# Patient Record
Sex: MALE | Race: WHITE | Employment: UNEMPLOYED | URBAN - METROPOLITAN AREA
[De-identification: names, ages, dates, MRNs, and addresses within clinical notes are randomized per-mention and may not be internally consistent; named-entity substitution may affect disease eponyms.]

---

## 2021-05-11 ENCOUNTER — APPOINTMENT (OUTPATIENT)
Dept: GENERAL RADIOLOGY | Age: 41
DRG: 890 | End: 2021-05-11
Payer: MEDICAID

## 2021-05-11 ENCOUNTER — HOSPITAL ENCOUNTER (INPATIENT)
Age: 41
LOS: 6 days | Discharge: HOME | DRG: 890 | End: 2021-05-18
Attending: EMERGENCY MEDICINE | Admitting: INTERNAL MEDICINE
Payer: MEDICAID

## 2021-05-11 DIAGNOSIS — B20 AIDS (ACQUIRED IMMUNE DEFICIENCY SYNDROME) (HCC): ICD-10-CM

## 2021-05-11 DIAGNOSIS — J18.9 PNEUMONIA DUE TO ORGANISM: Primary | ICD-10-CM

## 2021-05-11 DIAGNOSIS — E87.1 HYPONATREMIA: ICD-10-CM

## 2021-05-11 PROCEDURE — 80053 COMPREHEN METABOLIC PANEL: CPT

## 2021-05-11 PROCEDURE — 99283 EMERGENCY DEPT VISIT LOW MDM: CPT

## 2021-05-11 PROCEDURE — 87040 BLOOD CULTURE FOR BACTERIA: CPT

## 2021-05-11 PROCEDURE — 83690 ASSAY OF LIPASE: CPT

## 2021-05-11 PROCEDURE — 87086 URINE CULTURE/COLONY COUNT: CPT

## 2021-05-11 PROCEDURE — 81001 URINALYSIS AUTO W/SCOPE: CPT

## 2021-05-11 PROCEDURE — 85025 COMPLETE CBC W/AUTO DIFF WBC: CPT

## 2021-05-11 PROCEDURE — 96374 THER/PROPH/DIAG INJ IV PUSH: CPT

## 2021-05-11 PROCEDURE — 85610 PROTHROMBIN TIME: CPT

## 2021-05-11 PROCEDURE — 85730 THROMBOPLASTIN TIME PARTIAL: CPT

## 2021-05-11 PROCEDURE — 71045 X-RAY EXAM CHEST 1 VIEW: CPT

## 2021-05-11 PROCEDURE — 83605 ASSAY OF LACTIC ACID: CPT

## 2021-05-11 PROCEDURE — 36415 COLL VENOUS BLD VENIPUNCTURE: CPT

## 2021-05-11 RX ORDER — SODIUM CHLORIDE 0.9 % (FLUSH) 0.9 %
5-40 SYRINGE (ML) INJECTION EVERY 12 HOURS SCHEDULED
Status: DISCONTINUED | OUTPATIENT
Start: 2021-05-11 | End: 2021-05-12

## 2021-05-11 RX ORDER — SODIUM CHLORIDE 9 MG/ML
25 INJECTION, SOLUTION INTRAVENOUS PRN
Status: DISCONTINUED | OUTPATIENT
Start: 2021-05-11 | End: 2021-05-18 | Stop reason: HOSPADM

## 2021-05-11 RX ORDER — SODIUM CHLORIDE 0.9 % (FLUSH) 0.9 %
5-40 SYRINGE (ML) INJECTION PRN
Status: DISCONTINUED | OUTPATIENT
Start: 2021-05-11 | End: 2021-05-12

## 2021-05-11 RX ORDER — 0.9 % SODIUM CHLORIDE 0.9 %
30 INTRAVENOUS SOLUTION INTRAVENOUS ONCE
Status: COMPLETED | OUTPATIENT
Start: 2021-05-11 | End: 2021-05-12

## 2021-05-11 ASSESSMENT — ENCOUNTER SYMPTOMS
NAUSEA: 1
WHEEZING: 0
CONSTIPATION: 0
ABDOMINAL PAIN: 0
COUGH: 1
CHEST TIGHTNESS: 0
FACIAL SWELLING: 0
DIARRHEA: 1
VOMITING: 0
EYE DISCHARGE: 0
SHORTNESS OF BREATH: 1
EYE PAIN: 0
EYE REDNESS: 0
RHINORRHEA: 0
COLOR CHANGE: 0
TROUBLE SWALLOWING: 0
BLOOD IN STOOL: 0
SORE THROAT: 0
SINUS PRESSURE: 0
BACK PAIN: 0

## 2021-05-11 ASSESSMENT — PAIN DESCRIPTION - DESCRIPTORS: DESCRIPTORS: DISCOMFORT

## 2021-05-12 ENCOUNTER — APPOINTMENT (OUTPATIENT)
Dept: CT IMAGING | Age: 41
DRG: 890 | End: 2021-05-12
Payer: MEDICAID

## 2021-05-12 ENCOUNTER — ANESTHESIA EVENT (OUTPATIENT)
Dept: ENDOSCOPY | Age: 41
DRG: 890 | End: 2021-05-12
Payer: MEDICAID

## 2021-05-12 PROBLEM — E87.1 HYPONATREMIA: Status: ACTIVE | Noted: 2021-05-12

## 2021-05-12 PROBLEM — R63.4 WEIGHT LOSS: Status: ACTIVE | Noted: 2021-05-12

## 2021-05-12 PROBLEM — E86.0 DEHYDRATION: Status: ACTIVE | Noted: 2021-05-12

## 2021-05-12 PROBLEM — Z72.0 TOBACCO USE: Status: ACTIVE | Noted: 2021-05-12

## 2021-05-12 PROBLEM — J18.9 PNEUMONIA: Status: ACTIVE | Noted: 2021-05-12

## 2021-05-12 LAB
-: ABNORMAL
ABSOLUTE EOS #: 0 K/UL (ref 0–0.4)
ABSOLUTE IMMATURE GRANULOCYTE: ABNORMAL K/UL (ref 0–0.3)
ABSOLUTE LYMPH #: 0.16 K/UL (ref 1–4.8)
ABSOLUTE MONO #: 0.16 K/UL (ref 0.1–1.3)
AFP: 1.7 UG/L
ALBUMIN SERPL-MCNC: 2.9 G/DL (ref 3.5–5.2)
ALBUMIN/GLOBULIN RATIO: ABNORMAL (ref 1–2.5)
ALP BLD-CCNC: 121 U/L (ref 40–129)
ALPHA-1 ANTITRYPSIN: 167 MG/DL (ref 90–200)
ALT SERPL-CCNC: 64 U/L (ref 5–41)
AMORPHOUS: ABNORMAL
ANION GAP SERPL CALCULATED.3IONS-SCNC: 10 MMOL/L (ref 9–17)
ANION GAP SERPL CALCULATED.3IONS-SCNC: 9 MMOL/L (ref 9–17)
AST SERPL-CCNC: 94 U/L
BACTERIA: ABNORMAL
BASOPHILS # BLD: 0 % (ref 0–2)
BASOPHILS ABSOLUTE: 0 K/UL (ref 0–0.2)
BILIRUB SERPL-MCNC: 1.05 MG/DL (ref 0.3–1.2)
BILIRUBIN URINE: ABNORMAL
BUN BLDV-MCNC: 13 MG/DL (ref 6–20)
BUN BLDV-MCNC: 14 MG/DL (ref 6–20)
BUN/CREAT BLD: ABNORMAL (ref 9–20)
BUN/CREAT BLD: ABNORMAL (ref 9–20)
C DIFF AG + TOXIN: NEGATIVE
CALCIUM SERPL-MCNC: 7.6 MG/DL (ref 8.6–10.4)
CALCIUM SERPL-MCNC: 8.3 MG/DL (ref 8.6–10.4)
CASTS UA: ABNORMAL /LPF
CASTS UA: ABNORMAL /LPF
CERULOPLASMIN: 20 MG/DL (ref 15–30)
CHLORIDE BLD-SCNC: 100 MMOL/L (ref 98–107)
CHLORIDE BLD-SCNC: 91 MMOL/L (ref 98–107)
CO2: 21 MMOL/L (ref 20–31)
CO2: 25 MMOL/L (ref 20–31)
COLOR: ABNORMAL
COMMENT UA: ABNORMAL
CORTISOL COLLECTION INFO: ABNORMAL
CORTISOL: 24.7 UG/DL (ref 2.7–18.4)
CREAT SERPL-MCNC: 0.54 MG/DL (ref 0.7–1.2)
CREAT SERPL-MCNC: 0.78 MG/DL (ref 0.7–1.2)
CRYSTALS, UA: ABNORMAL /HPF
DIFFERENTIAL TYPE: ABNORMAL
EOSINOPHILS RELATIVE PERCENT: 0 % (ref 0–4)
EPITHELIAL CELLS UA: ABNORMAL /HPF
FERRITIN: 2657 UG/L (ref 30–400)
FOLATE: 11.4 NG/ML
GFR AFRICAN AMERICAN: >60 ML/MIN
GFR AFRICAN AMERICAN: >60 ML/MIN
GFR NON-AFRICAN AMERICAN: >60 ML/MIN
GFR NON-AFRICAN AMERICAN: >60 ML/MIN
GFR SERPL CREATININE-BSD FRML MDRD: ABNORMAL ML/MIN/{1.73_M2}
GLUCOSE BLD-MCNC: 84 MG/DL (ref 70–99)
GLUCOSE BLD-MCNC: 90 MG/DL (ref 70–99)
GLUCOSE URINE: NEGATIVE
HAV IGM SER IA-ACNC: NONREACTIVE
HCT VFR BLD CALC: 29.8 % (ref 41–53)
HEMOGLOBIN: 10.1 G/DL (ref 13.5–17.5)
HEPATITIS B CORE IGM ANTIBODY: NONREACTIVE
HEPATITIS B SURFACE ANTIGEN: NONREACTIVE
HEPATITIS C ANTIBODY: NONREACTIVE
HIV AG/AB: REACTIVE
IGA: 511 MG/DL (ref 70–400)
IMMATURE GRANULOCYTES: ABNORMAL %
INR BLD: 1.1
IRON SATURATION: 16 % (ref 20–55)
IRON: 25 UG/DL (ref 59–158)
KETONES, URINE: NEGATIVE
LACTIC ACID, SEPSIS WHOLE BLOOD: NORMAL MMOL/L (ref 0.5–1.9)
LACTIC ACID, SEPSIS: 1 MMOL/L (ref 0.5–1.9)
LEGIONELLA PNEUMOPHILIA AG, URINE: NEGATIVE
LEUKOCYTE ESTERASE, URINE: NEGATIVE
LIPASE: 84 U/L (ref 13–60)
LYMPHOCYTES # BLD: 3 % (ref 24–44)
MCH RBC QN AUTO: 30.5 PG (ref 26–34)
MCHC RBC AUTO-ENTMCNC: 33.9 G/DL (ref 31–37)
MCV RBC AUTO: 90.2 FL (ref 80–100)
MONOCYTES # BLD: 3 % (ref 1–7)
MORPHOLOGY: ABNORMAL
MORPHOLOGY: ABNORMAL
MUCUS: ABNORMAL
NITRITE, URINE: NEGATIVE
NRBC AUTOMATED: ABNORMAL PER 100 WBC
OTHER OBSERVATIONS UA: ABNORMAL
PARTIAL THROMBOPLASTIN TIME: 41.4 SEC (ref 24–36)
PDW BLD-RTO: 15.2 % (ref 11.5–14.9)
PH UA: 6 (ref 5–8)
PLATELET # BLD: 263 K/UL (ref 150–450)
PLATELET ESTIMATE: ABNORMAL
PMV BLD AUTO: 8.5 FL (ref 6–12)
POTASSIUM SERPL-SCNC: 3.7 MMOL/L (ref 3.7–5.3)
POTASSIUM SERPL-SCNC: 4.5 MMOL/L (ref 3.7–5.3)
PROTEIN UA: ABNORMAL
PROTHROMBIN TIME: 14.6 SEC (ref 11.8–14.6)
RBC # BLD: 3.3 M/UL (ref 4.5–5.9)
RBC # BLD: ABNORMAL 10*6/UL
RBC UA: ABNORMAL /HPF
RENAL EPITHELIAL, UA: ABNORMAL /HPF
SARS-COV-2, RAPID: NOT DETECTED
SEG NEUTROPHILS: 94 % (ref 36–66)
SEGMENTED NEUTROPHILS ABSOLUTE COUNT: 4.88 K/UL (ref 1.3–9.1)
SODIUM BLD-SCNC: 126 MMOL/L (ref 135–144)
SODIUM BLD-SCNC: 130 MMOL/L (ref 135–144)
SPECIFIC GRAVITY UA: 1.03 (ref 1–1.03)
SPECIMEN DESCRIPTION: NORMAL
SPECIMEN DESCRIPTION: NORMAL
T. PALLIDUM, IGG: REACTIVE
TOTAL IRON BINDING CAPACITY: 156 UG/DL (ref 250–450)
TOTAL PROTEIN: 7.2 G/DL (ref 6.4–8.3)
TRICHOMONAS: ABNORMAL
TSH SERPL DL<=0.05 MIU/L-ACNC: 1.42 MIU/L (ref 0.3–5)
TURBIDITY: ABNORMAL
UNSATURATED IRON BINDING CAPACITY: 131 UG/DL (ref 112–347)
URINE HGB: ABNORMAL
UROBILINOGEN, URINE: NORMAL
VITAMIN B-12: 547 PG/ML (ref 232–1245)
WBC # BLD: 5.2 K/UL (ref 3.5–11)
WBC # BLD: ABNORMAL 10*3/UL
WBC UA: ABNORMAL /HPF
YEAST: ABNORMAL

## 2021-05-12 PROCEDURE — 83540 ASSAY OF IRON: CPT

## 2021-05-12 PROCEDURE — 86359 T CELLS TOTAL COUNT: CPT

## 2021-05-12 PROCEDURE — 87449 NOS EACH ORGANISM AG IA: CPT

## 2021-05-12 PROCEDURE — 6370000000 HC RX 637 (ALT 250 FOR IP): Performed by: NURSE PRACTITIONER

## 2021-05-12 PROCEDURE — 6360000002 HC RX W HCPCS: Performed by: INTERNAL MEDICINE

## 2021-05-12 PROCEDURE — 99254 IP/OBS CNSLTJ NEW/EST MOD 60: CPT | Performed by: INTERNAL MEDICINE

## 2021-05-12 PROCEDURE — 87635 SARS-COV-2 COVID-19 AMP PRB: CPT

## 2021-05-12 PROCEDURE — 82607 VITAMIN B-12: CPT

## 2021-05-12 PROCEDURE — 87591 N.GONORRHOEAE DNA AMP PROB: CPT

## 2021-05-12 PROCEDURE — 2580000003 HC RX 258: Performed by: INTERNAL MEDICINE

## 2021-05-12 PROCEDURE — 82533 TOTAL CORTISOL: CPT

## 2021-05-12 PROCEDURE — 6360000004 HC RX CONTRAST MEDICATION: Performed by: INTERNAL MEDICINE

## 2021-05-12 PROCEDURE — 87491 CHLMYD TRACH DNA AMP PROBE: CPT

## 2021-05-12 PROCEDURE — 82105 ALPHA-FETOPROTEIN SERUM: CPT

## 2021-05-12 PROCEDURE — 2580000003 HC RX 258: Performed by: NURSE PRACTITIONER

## 2021-05-12 PROCEDURE — 6360000002 HC RX W HCPCS: Performed by: NURSE PRACTITIONER

## 2021-05-12 PROCEDURE — 86593 SYPHILIS TEST NON-TREP QUANT: CPT

## 2021-05-12 PROCEDURE — 86665 EPSTEIN-BARR CAPSID VCA: CPT

## 2021-05-12 PROCEDURE — 86038 ANTINUCLEAR ANTIBODIES: CPT

## 2021-05-12 PROCEDURE — 86664 EPSTEIN-BARR NUCLEAR ANTIGEN: CPT

## 2021-05-12 PROCEDURE — 86663 EPSTEIN-BARR ANTIBODY: CPT

## 2021-05-12 PROCEDURE — 86644 CMV ANTIBODY: CPT

## 2021-05-12 PROCEDURE — 86357 NK CELLS TOTAL COUNT: CPT

## 2021-05-12 PROCEDURE — 86702 HIV-2 ANTIBODY: CPT

## 2021-05-12 PROCEDURE — 82784 ASSAY IGA/IGD/IGG/IGM EACH: CPT

## 2021-05-12 PROCEDURE — 82390 ASSAY OF CERULOPLASMIN: CPT

## 2021-05-12 PROCEDURE — 86376 MICROSOMAL ANTIBODY EACH: CPT

## 2021-05-12 PROCEDURE — 6370000000 HC RX 637 (ALT 250 FOR IP): Performed by: INTERNAL MEDICINE

## 2021-05-12 PROCEDURE — 82746 ASSAY OF FOLIC ACID SERUM: CPT

## 2021-05-12 PROCEDURE — 86738 MYCOPLASMA ANTIBODY: CPT

## 2021-05-12 PROCEDURE — 82103 ALPHA-1-ANTITRYPSIN TOTAL: CPT

## 2021-05-12 PROCEDURE — APPNB30 APP NON BILLABLE TIME 0-30 MINS: Performed by: NURSE PRACTITIONER

## 2021-05-12 PROCEDURE — 6360000002 HC RX W HCPCS: Performed by: EMERGENCY MEDICINE

## 2021-05-12 PROCEDURE — 87389 HIV-1 AG W/HIV-1&-2 AB AG IA: CPT

## 2021-05-12 PROCEDURE — 6370000000 HC RX 637 (ALT 250 FOR IP): Performed by: EMERGENCY MEDICINE

## 2021-05-12 PROCEDURE — 80048 BASIC METABOLIC PNL TOTAL CA: CPT

## 2021-05-12 PROCEDURE — 87641 MR-STAPH DNA AMP PROBE: CPT

## 2021-05-12 PROCEDURE — 83550 IRON BINDING TEST: CPT

## 2021-05-12 PROCEDURE — 84443 ASSAY THYROID STIM HORMONE: CPT

## 2021-05-12 PROCEDURE — 86355 B CELLS TOTAL COUNT: CPT

## 2021-05-12 PROCEDURE — 71260 CT THORAX DX C+: CPT

## 2021-05-12 PROCEDURE — 87536 HIV-1 QUANT&REVRSE TRNSCRPJ: CPT

## 2021-05-12 PROCEDURE — 86701 HIV-1ANTIBODY: CPT

## 2021-05-12 PROCEDURE — 2580000003 HC RX 258: Performed by: EMERGENCY MEDICINE

## 2021-05-12 PROCEDURE — 86360 T CELL ABSOLUTE COUNT/RATIO: CPT

## 2021-05-12 PROCEDURE — 99223 1ST HOSP IP/OBS HIGH 75: CPT | Performed by: INTERNAL MEDICINE

## 2021-05-12 PROCEDURE — 82728 ASSAY OF FERRITIN: CPT

## 2021-05-12 PROCEDURE — 87324 CLOSTRIDIUM AG IA: CPT

## 2021-05-12 PROCEDURE — 86780 TREPONEMA PALLIDUM: CPT

## 2021-05-12 PROCEDURE — 2060000000 HC ICU INTERMEDIATE R&B

## 2021-05-12 PROCEDURE — 86480 TB TEST CELL IMMUN MEASURE: CPT

## 2021-05-12 PROCEDURE — 86645 CMV ANTIBODY IGM: CPT

## 2021-05-12 PROCEDURE — 83516 IMMUNOASSAY NONANTIBODY: CPT

## 2021-05-12 PROCEDURE — 80074 ACUTE HEPATITIS PANEL: CPT

## 2021-05-12 PROCEDURE — 36415 COLL VENOUS BLD VENIPUNCTURE: CPT

## 2021-05-12 RX ORDER — M-VIT,TX,IRON,MINS/CALC/FOLIC 27MG-0.4MG
1 TABLET ORAL DAILY
Status: DISCONTINUED | OUTPATIENT
Start: 2021-05-12 | End: 2021-05-18 | Stop reason: HOSPADM

## 2021-05-12 RX ORDER — NICOTINE 21 MG/24HR
1 PATCH, TRANSDERMAL 24 HOURS TRANSDERMAL DAILY PRN
Status: DISCONTINUED | OUTPATIENT
Start: 2021-05-12 | End: 2021-05-12

## 2021-05-12 RX ORDER — ACETAMINOPHEN 650 MG/1
650 SUPPOSITORY RECTAL EVERY 6 HOURS PRN
Status: DISCONTINUED | OUTPATIENT
Start: 2021-05-12 | End: 2021-05-18 | Stop reason: HOSPADM

## 2021-05-12 RX ORDER — ACETAMINOPHEN 325 MG/1
650 TABLET ORAL ONCE
Status: COMPLETED | OUTPATIENT
Start: 2021-05-12 | End: 2021-05-12

## 2021-05-12 RX ORDER — FLUCONAZOLE 100 MG/1
200 TABLET ORAL DAILY
Status: DISCONTINUED | OUTPATIENT
Start: 2021-05-12 | End: 2021-05-13

## 2021-05-12 RX ORDER — PROMETHAZINE HYDROCHLORIDE 25 MG/1
12.5 TABLET ORAL EVERY 6 HOURS PRN
Status: DISCONTINUED | OUTPATIENT
Start: 2021-05-12 | End: 2021-05-18 | Stop reason: HOSPADM

## 2021-05-12 RX ORDER — MAGNESIUM SULFATE 1 G/100ML
1000 INJECTION INTRAVENOUS PRN
Status: DISCONTINUED | OUTPATIENT
Start: 2021-05-12 | End: 2021-05-18 | Stop reason: HOSPADM

## 2021-05-12 RX ORDER — ONDANSETRON 2 MG/ML
4 INJECTION INTRAMUSCULAR; INTRAVENOUS EVERY 6 HOURS PRN
Status: DISCONTINUED | OUTPATIENT
Start: 2021-05-12 | End: 2021-05-18 | Stop reason: HOSPADM

## 2021-05-12 RX ORDER — POTASSIUM CHLORIDE 20 MEQ/1
40 TABLET, EXTENDED RELEASE ORAL PRN
Status: DISCONTINUED | OUTPATIENT
Start: 2021-05-12 | End: 2021-05-18 | Stop reason: HOSPADM

## 2021-05-12 RX ORDER — POLYETHYLENE GLYCOL 3350 17 G/17G
17 POWDER, FOR SOLUTION ORAL DAILY PRN
Status: DISCONTINUED | OUTPATIENT
Start: 2021-05-12 | End: 2021-05-18 | Stop reason: HOSPADM

## 2021-05-12 RX ORDER — SODIUM CHLORIDE 9 MG/ML
INJECTION, SOLUTION INTRAVENOUS CONTINUOUS
Status: DISCONTINUED | OUTPATIENT
Start: 2021-05-12 | End: 2021-05-18 | Stop reason: HOSPADM

## 2021-05-12 RX ORDER — 0.9 % SODIUM CHLORIDE 0.9 %
80 INTRAVENOUS SOLUTION INTRAVENOUS ONCE
Status: COMPLETED | OUTPATIENT
Start: 2021-05-12 | End: 2021-05-12

## 2021-05-12 RX ORDER — SODIUM CHLORIDE 0.9 % (FLUSH) 0.9 %
10 SYRINGE (ML) INJECTION PRN
Status: DISCONTINUED | OUTPATIENT
Start: 2021-05-12 | End: 2021-05-18 | Stop reason: HOSPADM

## 2021-05-12 RX ORDER — BENZONATATE 100 MG/1
100 CAPSULE ORAL 3 TIMES DAILY PRN
Status: DISCONTINUED | OUTPATIENT
Start: 2021-05-12 | End: 2021-05-17

## 2021-05-12 RX ORDER — SODIUM CHLORIDE 0.9 % (FLUSH) 0.9 %
5-40 SYRINGE (ML) INJECTION EVERY 12 HOURS SCHEDULED
Status: DISCONTINUED | OUTPATIENT
Start: 2021-05-12 | End: 2021-05-18 | Stop reason: HOSPADM

## 2021-05-12 RX ORDER — ONDANSETRON 2 MG/ML
4 INJECTION INTRAMUSCULAR; INTRAVENOUS ONCE
Status: DISCONTINUED | OUTPATIENT
Start: 2021-05-12 | End: 2021-05-18 | Stop reason: HOSPADM

## 2021-05-12 RX ORDER — SODIUM CHLORIDE 9 MG/ML
25 INJECTION, SOLUTION INTRAVENOUS PRN
Status: DISCONTINUED | OUTPATIENT
Start: 2021-05-12 | End: 2021-05-18 | Stop reason: HOSPADM

## 2021-05-12 RX ORDER — NICOTINE 21 MG/24HR
1 PATCH, TRANSDERMAL 24 HOURS TRANSDERMAL DAILY
Status: DISCONTINUED | OUTPATIENT
Start: 2021-05-12 | End: 2021-05-18 | Stop reason: HOSPADM

## 2021-05-12 RX ORDER — BENZONATATE 100 MG/1
100 CAPSULE ORAL 3 TIMES DAILY PRN
Status: DISCONTINUED | OUTPATIENT
Start: 2021-05-12 | End: 2021-05-12 | Stop reason: SDUPTHER

## 2021-05-12 RX ORDER — POTASSIUM CHLORIDE 7.45 MG/ML
10 INJECTION INTRAVENOUS PRN
Status: DISCONTINUED | OUTPATIENT
Start: 2021-05-12 | End: 2021-05-18 | Stop reason: HOSPADM

## 2021-05-12 RX ORDER — SODIUM CHLORIDE, SODIUM LACTATE, POTASSIUM CHLORIDE, CALCIUM CHLORIDE 600; 310; 30; 20 MG/100ML; MG/100ML; MG/100ML; MG/100ML
INJECTION, SOLUTION INTRAVENOUS CONTINUOUS
Status: CANCELLED | OUTPATIENT
Start: 2021-05-12

## 2021-05-12 RX ORDER — ACETAMINOPHEN 325 MG/1
650 TABLET ORAL EVERY 6 HOURS PRN
Status: DISCONTINUED | OUTPATIENT
Start: 2021-05-12 | End: 2021-05-18 | Stop reason: HOSPADM

## 2021-05-12 RX ADMIN — ACETAMINOPHEN 650 MG: 325 TABLET, FILM COATED ORAL at 13:19

## 2021-05-12 RX ADMIN — SODIUM CHLORIDE, PRESERVATIVE FREE 10 ML: 5 INJECTION INTRAVENOUS at 13:49

## 2021-05-12 RX ADMIN — CEFTRIAXONE SODIUM 1000 MG: 1 INJECTION, POWDER, FOR SOLUTION INTRAMUSCULAR; INTRAVENOUS at 14:51

## 2021-05-12 RX ADMIN — AZITHROMYCIN MONOHYDRATE 500 MG: 500 INJECTION, POWDER, LYOPHILIZED, FOR SOLUTION INTRAVENOUS at 00:48

## 2021-05-12 RX ADMIN — VANCOMYCIN HYDROCHLORIDE 1750 MG: 1 INJECTION, POWDER, LYOPHILIZED, FOR SOLUTION INTRAVENOUS at 17:02

## 2021-05-12 RX ADMIN — Medication 10 ML: at 11:13

## 2021-05-12 RX ADMIN — IOHEXOL 50 ML: 240 INJECTION, SOLUTION INTRATHECAL; INTRAVASCULAR; INTRAVENOUS; ORAL at 11:11

## 2021-05-12 RX ADMIN — FLUCONAZOLE 200 MG: 100 TABLET ORAL at 11:11

## 2021-05-12 RX ADMIN — SODIUM CHLORIDE 80 ML: 9 INJECTION, SOLUTION INTRAVENOUS at 13:49

## 2021-05-12 RX ADMIN — SODIUM CHLORIDE, PRESERVATIVE FREE 10 ML: 5 INJECTION INTRAVENOUS at 11:13

## 2021-05-12 RX ADMIN — SODIUM CHLORIDE: 9 INJECTION, SOLUTION INTRAVENOUS at 17:02

## 2021-05-12 RX ADMIN — SODIUM CHLORIDE, PRESERVATIVE FREE 10 ML: 5 INJECTION INTRAVENOUS at 22:00

## 2021-05-12 RX ADMIN — IOPAMIDOL 75 ML: 755 INJECTION, SOLUTION INTRAVENOUS at 13:48

## 2021-05-12 RX ADMIN — CEFTRIAXONE SODIUM 1000 MG: 1 INJECTION, POWDER, FOR SOLUTION INTRAMUSCULAR; INTRAVENOUS at 00:33

## 2021-05-12 RX ADMIN — SODIUM CHLORIDE 1701 ML: 9 INJECTION, SOLUTION INTRAVENOUS at 00:04

## 2021-05-12 RX ADMIN — MULTIPLE VITAMINS W/ MINERALS TAB 1 TABLET: TAB at 11:11

## 2021-05-12 RX ADMIN — CEFEPIME 2000 MG: 2 INJECTION, POWDER, FOR SOLUTION INTRAVENOUS at 17:02

## 2021-05-12 RX ADMIN — POLYETHYLENE GLYCOL-3350 AND ELECTROLYTES 4000 ML: 236; 6.74; 5.86; 2.97; 22.74 POWDER, FOR SOLUTION ORAL at 18:10

## 2021-05-12 RX ADMIN — ACETAMINOPHEN 650 MG: 325 TABLET ORAL at 00:54

## 2021-05-12 RX ADMIN — SODIUM CHLORIDE 75 ML/HR: 9 INJECTION, SOLUTION INTRAVENOUS at 03:10

## 2021-05-12 ASSESSMENT — ENCOUNTER SYMPTOMS
CHEST TIGHTNESS: 1
DIARRHEA: 1
TROUBLE SWALLOWING: 0
VOMITING: 0
PHOTOPHOBIA: 0
SHORTNESS OF BREATH: 1
RHINORRHEA: 0
NAUSEA: 1
SORE THROAT: 0
WHEEZING: 0
SINUS PAIN: 0
ALLERGIC/IMMUNOLOGIC NEGATIVE: 1
COUGH: 1
ABDOMINAL PAIN: 0
COLOR CHANGE: 0

## 2021-05-12 ASSESSMENT — PAIN SCALES - GENERAL
PAINLEVEL_OUTOF10: 5
PAINLEVEL_OUTOF10: 3

## 2021-05-12 ASSESSMENT — LIFESTYLE VARIABLES: SMOKING_STATUS: 1

## 2021-05-12 NOTE — ED NOTES
Mode of arrival (squad #, walk in, police, etc) : Walked into triage        Chief complaint(s): Pneumonia        Arrival Note (brief scenario, treatment PTA, etc). : States he has been sick since Jan. States he was diagnosed with Bronchitis. States he has had a 30 pound weight loss since Jan. Complaining of no appetite. States she has night sweats. States he developed diarrhea today. A/o x 3        C= \"Have you ever felt that you should Cut down on your drinking? \"  No  A= \"Have people Annoyed you by criticizing your drinking? \"  No  G= \"Have you ever felt bad or Guilty about your drinking? \"  No  E= \"Have you ever had a drink as an Eye-opener first thing in the morning to steady your nerves or to help a hangover? \"  No      Deferred []      Reason for deferring: N/A    *If yes to two or more: probable alcohol abuse. Andi Cabrera RN  05/12/21 1024

## 2021-05-12 NOTE — PROGRESS NOTES
Dr Sveta Almanzar notified of Dr Kalen Membreno request for a pulmonology consult. Will continue to follow.  Electronically signed by Arabella Sanderson RN on 5/12/2021 at 1:28 PM

## 2021-05-12 NOTE — ANESTHESIA PRE PROCEDURE
Smoking status: Current Every Day Smoker     Packs/day: 0.50     Types: Cigarettes    Smokeless tobacco: Never Used    Tobacco comment: HAsn't craved for past 5 months   Substance Use Topics    Alcohol use: Not Currently                                Ready to quit: Yes  Counseling given: Not Answered  Comment: HAsn't craved for past 5 months      Vital Signs (Current):   Vitals:    05/12/21 0254 05/12/21 0420 05/12/21 0745 05/12/21 1530   BP: 99/69 99/67 93/69 98/67   Pulse: 86 69 68 84   Resp: 18 18 20 19   Temp: 97.8 °F (36.6 °C) 97.2 °F (36.2 °C) 97.5 °F (36.4 °C) 97.9 °F (36.6 °C)   TempSrc: Oral Oral Oral Oral   SpO2: 95% 100%  98%   Weight:  147 lb 7.8 oz (66.9 kg)     Height:  5' 11\" (1.803 m)                                                BP Readings from Last 3 Encounters:   05/12/21 98/67       NPO Status:                                                                                 BMI:   Wt Readings from Last 3 Encounters:   05/12/21 147 lb 7.8 oz (66.9 kg)     Body mass index is 20.57 kg/m². CBC:   Lab Results   Component Value Date    WBC 5.2 05/11/2021    RBC 3.30 05/11/2021    HGB 10.1 05/11/2021    HCT 29.8 05/11/2021    MCV 90.2 05/11/2021    RDW 15.2 05/11/2021     05/11/2021       CMP:   Lab Results   Component Value Date     05/12/2021    K 3.7 05/12/2021     05/12/2021    CO2 21 05/12/2021    BUN 13 05/12/2021    CREATININE 0.54 05/12/2021    GFRAA >60 05/12/2021    LABGLOM >60 05/12/2021    GLUCOSE 90 05/12/2021    PROT 7.2 05/11/2021    CALCIUM 7.6 05/12/2021    BILITOT 1.05 05/11/2021    ALKPHOS 121 05/11/2021    AST 94 05/11/2021    ALT 64 05/11/2021       POC Tests: No results for input(s): POCGLU, POCNA, POCK, POCCL, POCBUN, POCHEMO, POCHCT in the last 72 hours.     Coags:   Lab Results   Component Value Date    PROTIME 14.6 05/11/2021    INR 1.1 05/11/2021    APTT 41.4 05/11/2021       HCG (If Applicable): No results found for: PREGTESTUR, PREGSERUM, HCG, HCGQUANT     ABGs: No results found for: PHART, PO2ART, TIA4PWK, YQQ6VZS, BEART, O0UKVSFM     Type & Screen (If Applicable):  No results found for: LABABO, LABRH    Drug/Infectious Status (If Applicable):  No results found for: HIV, HEPCAB    COVID-19 Screening (If Applicable):   Lab Results   Component Value Date    COVID19 Not Detected 05/12/2021           Anesthesia Evaluation  Patient summary reviewed and Nursing notes reviewed no history of anesthetic complications:   Airway: Mallampati: III  TM distance: >3 FB   Neck ROM: full  Mouth opening: > = 3 FB Dental:      Comment: Intact front, missing posterior    Pulmonary:normal exam  breath sounds clear to auscultation  (+) pneumonia: unresolved,  current smoker                           Cardiovascular:Negative CV ROS            Rhythm: regular  Rate: normal                    Neuro/Psych:   Negative Neuro/Psych ROS              GI/Hepatic/Renal:   (+) bowel prep,          ROS comment: Weight loss. Endo/Other:    (+) blood dyscrasia: anemia:., .                 Abdominal:           Vascular: negative vascular ROS. Anesthesia Plan      MAC     ASA 3       Induction: intravenous. MIPS: Prophylactic antiemetics administered. Anesthetic plan and risks discussed with patient. Plan discussed with CRNA.                   Lay Cabrera MD   5/12/2021

## 2021-05-12 NOTE — CONSULTS
[facility] -   Infectious diseases evaluation  admission date 5/11/2021    reason for consultation:   Pneumonia    Impression :   Current:  · Cavitary and noncavitary lung nodules/multiple splenic hypodensities, cough, shortness of breath, fatigue, fever, diarrhea, decreased appetite and thrush  homosexual male differential diagnosis includes infection or malignancy  · Thrush  · Hyponatremia  · Smoking  · History of kidney stones        Recommendations   · Pulmonary consult /bronchoscopy  · Continue IV Zithromax  · Discontinue ceftriaxone  · IV vancomycin and cefepime  · Nasal swab for MRSA  · Continue Diflucan  · HIV viral load  · Lymphocyte subtype  · Echocardiogram  · Nasal swab for MRSA  · Procalcitonin level  · QuantiFERON-TB test  · HIV screen pending  · Stool for C. difficile pending  · Hepatitis panel pending  · HIV screen pending  · Syphilis serology pending  · Urine for Legionella antigen negative  · Urine for GC and Chlamydia DNA pending  · COVID-19 rapid test negative  · Follow blood cultures  · GI consulted for EGD        History of Present Illness:   Initial history:  Darling Santana is a 45y.o.-year-old male presented to hospital with worsening shortness of breath associated with cough productive white phlegm, generalized weakness and subjective fever for 4 months, gradual onset, continues, moderate in severity, no alleviating or aggravating factors. Patient also complaining of decreased appetite, dizziness, significant weight loss. He was constipated initially then developed diarrhea after stool softeners. He also complaining of thrush, tried several over-the-counter treatments with no improvement  He is homosexual, had multiple partners in the past, none of his previous partners has been sick to his knowledge, denied recent travel or sick contact, has not been sexually active since January.   Patient was tested negative for HIV reportedly as Mood and Affect: Mood normal.         Behavior: Behavior normal.         Past Medical History:     Past Medical History:   Diagnosis Date    Hydronephrosis 2015    Pt unsure of dx time but believes about 6 yrs ago    Kidney stone        Past Surgical  History:     Past Surgical History:   Procedure Laterality Date    APPENDECTOMY  2005    Pt believes when he was about 25yrs        Medications:      sodium chloride flush  5-40 mL Intravenous 2 times per day    [Held by provider] enoxaparin  40 mg Subcutaneous Daily    cefTRIAXone (ROCEPHIN) IV  1,000 mg Intravenous Q12H    azithromycin  500 mg Intravenous Q24H    nicotine  1 patch Transdermal Daily    fluconazole  200 mg Oral Daily    therapeutic multivitamin-minerals  1 tablet Oral Daily    polyethylene glycol  4,000 mL Oral Once    ondansetron  4 mg Intravenous Once       Social History:     Social History     Socioeconomic History    Marital status: Single     Spouse name: Not on file    Number of children: Not on file    Years of education: Not on file    Highest education level: Not on file   Occupational History    Not on file   Social Needs    Financial resource strain: Not on file    Food insecurity     Worry: Not on file     Inability: Not on file    Transportation needs     Medical: Not on file     Non-medical: Not on file   Tobacco Use    Smoking status: Current Every Day Smoker     Packs/day: 0.50     Types: Cigarettes    Smokeless tobacco: Never Used    Tobacco comment: HAsn't craved for past 5 months   Substance and Sexual Activity    Alcohol use: Not Currently    Drug use: Not Currently     Types: Marijuana     Comment: Feburary 2021 last time     Sexual activity: Not Currently     Partners: Male   Lifestyle    Physical activity     Days per week: Not on file     Minutes per session: Not on file    Stress: Not on file   Relationships    Social connections     Talks on phone: Not on file     Gets together: Not on file Attends [employer] witness service: Not on file     Active member of club or organization: Not on file     Attends meetings of clubs or organizations: Not on file     Relationship status: Not on file    Intimate partner violence     Fear of current or ex partner: Not on file     Emotionally abused: Not on file     Physically abused: Not on file     Forced sexual activity: Not on file   Other Topics Concern    Not on file   Social History Narrative    Not on file       Family History:   History reviewed. No pertinent family history. Medical Decision Making:   I have independently reviewed/ordered the following labs:    CBC with Differential:   Recent Labs     05/11/21  2344   WBC 5.2   HGB 10.1*   HCT 29.8*      LYMPHOPCT 3*   MONOPCT 3     BMP:  Recent Labs     05/11/21  2344 05/12/21  0546   * 130*   K 4.5 3.7   CL 91* 100   CO2 25 21   BUN 14 13   CREATININE 0.78 0.54*     Hepatic Function Panel:   Recent Labs     05/11/21  2344   PROT 7.2   LABALBU 2.9*   BILITOT 1.05   ALKPHOS 121   ALT 64*   AST 94*     No results for input(s): RPR in the last 72 hours. No results for input(s): HIV in the last 72 hours. No results for input(s): BC in the last 72 hours. Lab Results   Component Value Date    CREATININE 0.54 05/12/2021    GLUCOSE 90 05/12/2021       Detailed results: Thank you for allowing us to participate in the care of this patient. Please call with questions. This note is created with the assistance of a speech recognition program.  While intending to generate adocument that actually reflects the content of the visit, the document can still have some errors including those of syntax and sound a like substitutions which may escape proof reading. It such instances, actual meaningcan be extrapolated by contextual diversion.     Doc Overall, MD  Office: [phone]  Perfect serve / office [phone]

## 2021-05-12 NOTE — PLAN OF CARE
PRE CONSULT ROUNDING NOTE  HPI  45year old male with pmh of kidney stones who presented to the ED for  Fever dizziness decreased appetite constipation cough reported fever and weight loss. He is on antibiotics for possible pneumonia Our service is consulted for possible need for egd to r/o candida esophagitis. He states his symptoms started in January of this year. He was treated with a z pack one month ago with some improvement but not complete resolution of the symptoms. He has had daily night sweats, 30# weight loss also . States his covid testing was negative. He reports constipation \"for months\". He took a suppository one week ago which resulted in him having \"black stool pellets\" and then one episode of green non bloody diarrhea yesterday. He has been taking 4 tablets of motrin (200mg each) 3-4 times per week since last October but denies tylenol use. He denies previous liver disease and anemia. A CT AP has not been completed yet. Na 130 hgb 10.1 inr 1.1 alt 64 ast 94 lipase 84. cxr shows small airways disease and reticulonodular interstitial disease. Urinalysis is positive for bilirubin and moderate hgb and bacteria. The pt has not had dysphagia odynophagia hematemesis hematochezia abdominal pain rash new joint pain.     Endoscopy none  Family reports no hx of liver pancreatic stomach or colon cancer no UC/crohns  Social positive for  smoking no etoh positive for marijuana use   BP 93/69   Pulse 68   Temp 97.5 °F (36.4 °C) (Oral)   Resp 20   Ht 5' 11\" (1.803 m)   Wt 147 lb 7.8 oz (66.9 kg)   SpO2 100%   BMI 20.57 kg/m²     ROS as above meds labs imaging and past medical records were reviewed    Exam  General Appearance: alert and oriented to person, place and time, well-developed and mal-nourished, in no acute distress  Skin: warm and dry, no rash or erythema  Head: normocephalic and atraumatic  Eyes: pupils equal, round, and reactive to light, extraocular eye movements intact, conjunctivae normal  ENT: hearing grossly normal bilaterally  Neck: neck supple and non tender without mass, no thyromegaly or thyroid nodules, no cervical lymphadenopathy   Pulmonary/Chest: clear to auscultation bilaterally- no wheezes, rales or rhonchi, normal air movement, no respiratory distress  Cardiovascular: normal rate, regular rhythm, normal S1 and S2, no murmurs, rubs, clicks or gallops, distal pulses intact, no carotid bruits  Abdomen: soft,  non tender, non-distended, normal bowel sounds, no masses or organomegaly no ascites  Extremities: no cyanosis, clubbing or edema  Musculoskeletal: normal range of motion, no joint swelling, deformity or tenderness  Neurologic: no cranial nerve deficit and muscle strength normal    Assessment  Anemia with unintentional  weight loss and night sweats  Constipation/change in the bowel habits  Elevated lft's  Pneumonia  Hyponatremia  Elevated lipase    Plan  Will discuss case with md, will likely need egd and colonoscopy to eval anemia and weight loss  Anemia profile and hepatitis panel /liver w/u ordered  Recheck lft In am  Await results of ct abd  ID consult pending  Continue antibiotics for pneumonia  Formal gi consult to follow  . Leeanne Padilla, APRN - CNP

## 2021-05-12 NOTE — ED NOTES
Up to [facility] and had light brown liquid diarrhea.      Keven Power RN  05/12/21 0077

## 2021-05-12 NOTE — ED NOTES
Admission Dx: Pneumonia    Pts Chief Complaints on Arrival: feeling ill    ADL's - Partial assistance    Pending Diagnostics:  n/a    Residence PTA: single story home    Special Considerations/Circumstances:  n/a    Vitals: Current vital signs:  BP 99/69   Pulse 86   Temp 97.8 °F (36.6 °C) (Oral)   Resp 18   Ht 5' 11\" (1.803 m)   Wt 125 lb (56.7 kg)   SpO2 95%   BMI 17.43 kg/m²                MEWS Score: Severino Wrne 41, RN  05/12/21 7031

## 2021-05-12 NOTE — PLAN OF CARE
Problem: Falls - Risk of:  Goal: Will remain free from falls  Description: Will remain free from falls  5/12/2021 1832 by Martina Cuellar RN  Outcome: Ongoing  Note: The patient remained free from falls this shift, call light within reach, bed in locked and lowest position. Side rails up x2. Continue to monitor closely. 5/12/2021 0530 by Ben Cohen RN  Outcome: Ongoing  Goal: Absence of physical injury  Description: Absence of physical injury  5/12/2021 1832 by Martina Cuellar RN  Outcome: Ongoing  5/12/2021 0530 by Ben Cohen RN  Outcome: Ongoing     Problem: Patient Goal of the Day:  Goal: Patient Daily Goal Reviewed with:  Outcome: Ongoing  Goal: Short Term/Patient Specific Goal:  Outcome: Ongoing  Goal: Patient's reason for admission:  Outcome: Ongoing     Problem: Infection:  Goal: Will remain free from infection  Description: Will remain free from infection  Outcome: Ongoing     Problem: Safety:  Goal: Free from accidental physical injury  Description: Free from accidental physical injury  Outcome: Ongoing  Goal: Free from intentional harm  Description: Free from intentional harm  Outcome: Ongoing     Problem: Skin Integrity:  Goal: Skin integrity will stabilize  Description: Skin integrity will stabilize  Outcome: Ongoing  Note: No skin breakdown this shift.       Problem: Discharge Planning:  Goal: Patients continuum of care needs are met  Description: Patients continuum of care needs are met  Outcome: Ongoing

## 2021-05-12 NOTE — ED PROVIDER NOTES
[address]  eMERGENCY dEPARTMENT eNCOUnter      Pt Name: Chantel Ruano  MRN: [id number]  [place] 1980  Date of evaluation: 5/11/21      CHIEF COMPLAINT       Chief Complaint   Patient presents with    Fever     Pt states fever, dizziness, dehydration, no appetite, constipation, weight loss, and several other complaints. HISTORY OF PRESENT ILLNESS    Chantel Ruano is a 45 y.o. male who presents complaining of feeling ill. Patient states that he has been sick pretty much for the last 4 months. Patient has been dehydrated not eating very well no appetite fever fatigue dizziness chest pain shortness of breath cough etc.  Patient has been tested twice for Covid including a third time today all of which have been negative but he does not have the results for today. Patient has been having intermittent fevers. Patient was placed on a Z-Aristeo about a month ago which stated that a lot of his chest congestion and pain did get better. Patient was also tested for HIV and it was negative. REVIEW OF SYSTEMS       Review of Systems   Constitutional: Positive for activity change, appetite change, chills, diaphoresis, fatigue and fever. HENT: Positive for congestion. Negative for ear pain, facial swelling, nosebleeds, rhinorrhea, sinus pressure, sore throat and trouble swallowing. Eyes: Negative for pain, discharge and redness. Respiratory: Positive for cough and shortness of breath. Negative for chest tightness and wheezing. Cardiovascular: Positive for chest pain. Negative for palpitations and leg swelling. Gastrointestinal: Positive for diarrhea and nausea. Negative for abdominal pain, blood in stool, constipation and vomiting. Genitourinary: Negative for difficulty urinating, dysuria, flank pain, frequency, genital sores and hematuria. Musculoskeletal: Positive for myalgias. Negative for arthralgias, back pain, gait problem, joint swelling and neck pain.    Skin: Negative for color change, pallor, rash and wound. Neurological: Positive for headaches. Negative for dizziness, tremors, seizures, syncope, speech difficulty, weakness and numbness. Psychiatric/Behavioral: Negative for confusion, decreased concentration, hallucinations, self-injury, sleep disturbance and suicidal ideas. PAST MEDICAL HISTORY     Past Medical History:   Diagnosis Date    Kidney stone        SURGICAL HISTORY     History reviewed. No pertinent surgical history. CURRENT MEDICATIONS       Previous Medications    No medications on file       ALLERGIES     is allergic to bee venom. SOCIAL HISTORY      reports that he has been smoking cigarettes. He has been smoking about 0.50 packs per day. He has never used smokeless tobacco. He reports current drug use. Drug: Marijuana. PHYSICAL EXAM     INITIAL VITALS: BP 94/74   Pulse 121   Temp 101.1 °F (38.4 °C) (Oral)   Resp 18   Ht 5' 11\" (1.803 m)   Wt 125 lb (56.7 kg)   SpO2 98%   BMI 17.43 kg/m²      Physical Exam  Vitals signs and nursing note reviewed. Constitutional:       General: He is not in acute distress. Appearance: He is well-developed. He is not diaphoretic. HENT:      Head: Normocephalic and atraumatic. Eyes:      General: No scleral icterus. Right eye: No discharge. Left eye: No discharge. Conjunctiva/sclera: Conjunctivae normal.      Pupils: Pupils are equal, round, and reactive to light. Cardiovascular:      Rate and Rhythm: Regular rhythm. Tachycardia present. Heart sounds: Normal heart sounds. No murmur. No friction rub. No gallop. Pulmonary:      Effort: Pulmonary effort is normal. No respiratory distress. Breath sounds: Normal breath sounds. No wheezing or rales. Chest:      Chest wall: No tenderness. Abdominal:      General: Bowel sounds are normal. There is no distension. Palpations: Abdomen is soft. There is no mass. Tenderness: There is no abdominal tenderness. There is no guarding or rebound. Musculoskeletal: Normal range of motion. General: No tenderness. Skin:     General: Skin is warm and dry. Coloration: Skin is not pale. Findings: No erythema or rash. Neurological:      Mental Status: He is alert and oriented to person, place, and time. Cranial Nerves: No cranial nerve deficit. Sensory: No sensory deficit. Motor: No abnormal muscle tone. Coordination: Coordination normal.      Deep Tendon Reflexes: Reflexes normal.   Psychiatric:         Behavior: Behavior normal.         Thought Content: Thought content normal.         Judgment: Judgment normal.         DIAGNOSTIC RESULTS     RADIOLOGY:All plain film, CT,MRI, and formal ultrasound images (except ED bedside ultrasound) are read by the radiologist and the interpretations are directly viewed by the emergency physician. Xr Chest Portable    Result Date: 5/11/2021  EXAMINATION: ONE XRAY VIEW OF THE CHEST 5/11/2021 11:27 pm COMPARISON: None. HISTORY: ORDERING SYSTEM PROVIDED HISTORY: Cough, SOB TECHNOLOGIST PROVIDED HISTORY: Cough, SOB Reason for Exam: Cough, SOB Acuity: Acute Type of Exam: Initial Additional signs and symptoms: Cough, SOB Relevant Medical/Surgical History: Cough, SOB FINDINGS: Lungs are hyperaerated. There are increased reticulonodular interstitial markings throughout. Mediastinum normal.  Bony thorax intact. Small airways disease and reticulonodular interstitial disease. Differential considerations include infectious and inflammatory interstitial lung disease. LABS: All lab results were reviewed by myself, and all abnormals are listed below.   Labs Reviewed   CBC WITH AUTO DIFFERENTIAL - Abnormal; Notable for the following components:       Result Value    RBC 3.30 (*)     Hemoglobin 10.1 (*)     Hematocrit 29.8 (*)     RDW 15.2 (*)     Seg Neutrophils 94 (*)     Lymphocytes 3 (*)     Absolute Lymph # 0.16 (*)     All other components within normal limits   COMPREHENSIVE METABOLIC PANEL W/ REFLEX TO MG FOR LOW K - Abnormal; Notable for the following components:    Calcium 8.3 (*)     Sodium 126 (*)     Chloride 91 (*)     ALT 64 (*)     AST 94 (*)     Albumin 2.9 (*)     All other components within normal limits   URINALYSIS - Abnormal; Notable for the following components:    Color, UA DARK YELLOW (*)     Turbidity UA CLOUDY (*)     Bilirubin Urine   (*)     Value: Presumptive positive. Unable to confirm due to unavailability of reagent. Urine Hgb MOD (*)     Protein, UA 2+ (*)     All other components within normal limits   APTT - Abnormal; Notable for the following components:    PTT 41.4 (*)     All other components within normal limits   LIPASE - Abnormal; Notable for the following components:    Lipase 84 (*)     All other components within normal limits   MICROSCOPIC URINALYSIS - Abnormal; Notable for the following components:    Bacteria, UA MODERATE (*)     All other components within normal limits   CULTURE, BLOOD 1   CULTURE, BLOOD 2   CULTURE, URINE   LEGIONELLA ANTIGEN, URINE   COVID-19, RAPID   LACTATE, SEPSIS   PROTIME-INR   LACTATE, SEPSIS         MEDICAL DECISION MAKING:     Patient meets criteria for code sepsis so we will do a full work-up. Blood pressure is marginal give fluids see how this does but at this time I do not believe he is septic shock.       EMERGENCY DEPARTMENT COURSE:   Vitals:    Vitals:    05/11/21 2228 05/11/21 2329   BP: 94/74    Pulse: 121    Resp: 18    Temp: 101.1 °F (38.4 °C)    TempSrc: Oral    SpO2: 98%    Weight: 125 lb (56.7 kg) 125 lb (56.7 kg)   Height: 5' 11\" (1.803 m)        The patient was given the following medications while in the emergency department:  Orders Placed This Encounter   Medications    sodium chloride flush 0.9 % injection 5-40 mL    sodium chloride flush 0.9 % injection 5-40 mL    0.9 % sodium chloride infusion    0.9 % sodium chloride IV bolus 1,701 mL    cefTRIAXone (ROCEPHIN) 1000 mg IVPB in 50 mL D5W minibag     Order Specific Question:   Antimicrobial Indications     Answer: Other     Order Specific Question:   Other Abx Indication     Answer: Suspected Sepsis of Pulmonary Origin - Community Acquired    azithromycin (ZITHROMAX) 500 mg in D5W 250ml addavial     Order Specific Question:   Antimicrobial Indications     Answer: Other     Order Specific Question:   Other Abx Indication     Answer: Suspected Sepsis of Pulmonary Origin - Community Acquired       -------------------------  12:43 AM EDT  Patient has been updated on results. Patient does appear to be dehydrated and possibly with pneumonia. Lactate is normal therefore is not severe sepsis. At this point time we will go ahead and get him admitted for IV antibiotics and treatment of his sodium. Because the low sodium and the pneumonia I will consider legionnaires and get the test but I do not think that that is the current issue. CONSULTS:  IP CONSULT TO PRIMARY CARE PROVIDER    PROCEDURES:  None    FINAL IMPRESSION      1. Pneumonia due to organism    2. Hyponatremia          DISPOSITION/PLAN   DISPOSITION Decision To Admit 05/12/2021 12:42:06 AM      PATIENT REFERREDTO:  No follow-up provider specified.     DISCHARGEMEDICATIONS:  New Prescriptions    No medications on file       (Please note that portions of this note were completed with a voice recognition program.  Efforts were made to edit thedictations but occasionally words are mis-transcribed.)    Fermín Almanzar MD  Attending Emergency Physician                        Fermín Almanzar MD  05/12/21 [id number]

## 2021-05-12 NOTE — PLAN OF CARE
Spoke with nurse, she will call CT @ [phone] once patient is done drinking oral contrast. We will scan patient 45 minutes after they are done.

## 2021-05-12 NOTE — H&P
[address] [facility]     HISTORY AND PHYSICAL EXAMINATION            Date:   5/12/2021  Patientname:  Ada James  Date of admission:  5/11/2021 10:51 PM  MRN:   [id number]  Account:  [de-identified]  YOB: 1980  PCP:    No primary care provider on file. Room:   B/B  Code Status:    No Order    CHIEF COMPLAINT     Chief Complaint   Patient presents with    Fever     Pt states fever, dizziness, dehydration, no appetite, constipation, weight loss, and several other complaints. HISTORY OF PRESENT ILLNESS  (Character, Onset, Location, Duration,  Exacerbating/RelievingFactors, Radiation,   Associated Symptoms, Severity )      The patient is a 45 y.o.  male, with a history of kidney stones and tobacco use. Patient presents with cough, shortness of breath, fatigue, diarrhea, and fever. Patient denies chest pain, abdominal pain, nausea, vomiting, or dysuria. Patient was placed on a Z-Aristeo a month ago for his cough. He has had multiple negative Covid test recently. He was also tested for HIV which was negative. HPI   1) Location/Symptom cough, shortness of breath, fatigue, fever, diarrhea  2) Timing/Onset: Gradually worsening over the past 4 months  3) Context/Setting: Has had negative Covid and HIV testing, decreased appetite, weight loss  4) Quality: Chest tightness  5) Duration: continuous   6) Modifying Factors: No alleviating or aggravating factors  7) Severity: moderate        PAST MEDICAL HISTORY   Patient  has a past medical history of Kidney stone. PAST SURGICAL HISTORY    Patient  has no past surgical history on file. FAMILY HISTORY    Patient family history is not on file. SOCIAL HISTORY    Patient  reports that he has been smoking cigarettes. He has been smoking about 0.50 packs per day. He has never used smokeless tobacco. He reports current drug use. Drug: Marijuana.      HOME MEDICATIONS        Prior to Admission medications    Not on File ALLERGIES      Bee venom    REVIEW OF SYSTEMS     Review of Systems   Constitutional: Positive for activity change, appetite change, chills, diaphoresis, fatigue, fever and unexpected weight change. HENT: Negative for congestion, ear pain, rhinorrhea, sinus pain, sore throat and trouble swallowing. Eyes: Negative for photophobia and visual disturbance. Respiratory: Positive for cough, chest tightness and shortness of breath. Negative for wheezing. Cardiovascular: Negative for chest pain and leg swelling. Gastrointestinal: Positive for diarrhea and nausea. Negative for abdominal pain and vomiting. Endocrine: Negative for polydipsia, polyphagia and polyuria. Genitourinary: Negative for dysuria, flank pain and hematuria. Musculoskeletal: Positive for myalgias. Negative for arthralgias, gait problem and neck pain. Skin: Positive for pallor. Negative for color change and rash. Allergic/Immunologic: Negative. Neurological: Negative for dizziness, weakness, light-headedness and headaches. Hematological: Negative. Psychiatric/Behavioral: Negative for behavioral problems, confusion, decreased concentration and hallucinations. The patient is not nervous/anxious. PHYSICAL EXAM      BP 99/69   Pulse 86   Temp 97.8 °F (36.6 °C) (Oral)   Resp 18   Ht 5' 11\" (1.803 m)   Wt 125 lb (56.7 kg)   SpO2 95%   BMI 17.43 kg/m²  Body mass index is 17.43 kg/m². Physical Exam  Constitutional:       Appearance: Normal appearance. He is well-developed, well-groomed and underweight. He is ill-appearing and diaphoretic. HENT:      Head: Normocephalic. Right Ear: External ear normal.      Left Ear: External ear normal.      Nose: Nose normal.      Mouth/Throat:      Mouth: Mucous membranes are dry. Eyes:      Extraocular Movements: Extraocular movements intact. Conjunctiva/sclera: Conjunctivae normal.      Pupils: Pupils are equal, round, and reactive to light.    Cardiovascular: Rate and Rhythm: Normal rate and regular rhythm. Pulses: Normal pulses. Heart sounds: Normal heart sounds, S1 normal and S2 normal.   Pulmonary:      Effort: Pulmonary effort is normal. Tachypnea present. No accessory muscle usage, respiratory distress or retractions. Breath sounds: Normal breath sounds. No decreased breath sounds, wheezing, rhonchi or rales. Abdominal:      General: Bowel sounds are normal. There is no distension. Palpations: Abdomen is soft. Tenderness: There is no abdominal tenderness. Musculoskeletal: Normal range of motion. Right lower leg: No edema. Left lower leg: No edema. Skin:     General: Skin is warm. Capillary Refill: Capillary refill takes less than 2 seconds. Coloration: Skin is pale. Neurological:      Mental Status: He is alert and oriented to person, place, and time. GCS: GCS eye subscore is 4. GCS verbal subscore is 5. GCS motor subscore is 6. Psychiatric:         Mood and Affect: Mood normal.         Behavior: Behavior normal.         Thought Content: Thought content normal.         Judgment: Judgment normal.       DIAGNOSTICS      EKG:     Labs:  CBC:   Recent Labs     05/11/21  2344   WBC 5.2   HGB 10.1*        BMP:    Recent Labs     05/11/21  2344   *   K 4.5   CL 91*   CO2 25   BUN 14   CREATININE 0.78   GLUCOSE 84     S. Calcium:  Recent Labs     05/11/21  2344   CALCIUM 8.3*     S. Ionized Calcium:No results for input(s): IONCA in the last 72 hours. S. Magnesium:No results for input(s): MG in the last 72 hours. S. Phosphorus:No results for input(s): PHOS in the last 72 hours. S. Glucose:No results for input(s): POCGLU in the last 72 hours. Glycosylated hemoglobin A1C: No results found for: LABA1C  Hepatic:   Recent Labs     05/11/21  2344   AST 94*   ALT 64*   ALKPHOS 121     CARDIAC ENZY: No results for input(s): CKTOTAL, CKMB, CKMBINDEX, TROPHS, MYOGLOBIN in the last 72 hours.   INR:   Recent Labs     05/11/21  2344   INR 1.1     BNP: No results for input(s): PROBNP in the last 72 hours. ABGs: No results for input(s): PH, PCO2, PO2, HCO3, O2SAT in the last 72 hours. Lipids: No results for input(s): CHOL, TRIG, HDL, LDLCALC in the last 72 hours. Invalid input(s): LDL  Pancreatic functions:  Recent Labs     05/11/21  2344   LIPASE 84*     S. LacticAcid: No results for input(s): LACTA in the last 72 hours. Thyroid functions: No results found for: TSH   U/A:  Recent Labs     05/11/21  0000   COLORU DARK YELLOW*   WBCUA 2 TO 5   RBCUA 2 TO 5   MUCUS NOT REPORTED   BACTERIA MODERATE*   SPECGRAV 1.026   LEUKOCYTESUR NEGATIVE   GLUCOSEU NEGATIVE   AMORPHOUS NOT REPORTED       Imaging/Diagonstics:     Xr Chest Portable    Result Date: 5/11/2021  EXAMINATION: ONE XRAY VIEW OF THE CHEST 5/11/2021 11:27 pm COMPARISON: None. HISTORY: ORDERING SYSTEM PROVIDED HISTORY: Cough, SOB TECHNOLOGIST PROVIDED HISTORY: Cough, SOB Reason for Exam: Cough, SOB Acuity: Acute Type of Exam: Initial Additional signs and symptoms: Cough, SOB Relevant Medical/Surgical History: Cough, SOB FINDINGS: Lungs are hyperaerated. There are increased reticulonodular interstitial markings throughout. Mediastinum normal.  Bony thorax intact. Small airways disease and reticulonodular interstitial disease. Differential considerations include infectious and inflammatory interstitial lung disease. ASSESSMENT  and  PLAN     Principal Problem:    Pneumonia  Active Problems:    Tobacco use    Weight loss    Dehydration    Hyponatremia  Resolved Problems:    * No resolved hospital problems. *    Plan:    Pneumonia/dehydration/weight loss  -Chest x-ray shows small airways disease and reticular nodular interstitial disease.  -Patient started on Rocephin and azithromycin in the ED.  Continue upon admission  -WBC 5.2, lactic acid 1.0, blood cultures obtained  -AST 94, ALT 64, alk phos 21, lipase 84  -Urinalysis dark yellow, moderate bacteria,

## 2021-05-12 NOTE — PROGRESS NOTES
Pharmacy Note  Vancomycin Consult    Elsa Wei is a 45 y.o. male started on Vancomycin for Pneumonia; consult received from Dr. Debbie Pollock to manage therapy. Also receiving the following antibiotics: Zithromax, Cefepime, Fluconazole. Patient Active Problem List   Diagnosis    Pneumonia due to organism    Tobacco use    Weight loss    Dehydration    Hyponatremia    Anemia    Elevated LFTs    Constipation     Allergies:  Bee venom     Temp max: 101.1    Recent Labs     05/11/21  2344 05/12/21  0546   BUN 14 13   CREATININE 0.78 0.54*   WBC 5.2  --        Intake/Output Summary (Last 24 hours) at 5/12/2021 1737  Last data filed at 5/12/2021 1322  Gross per 24 hour   Intake --   Output 1000 ml   Net -1000 ml     Culture Date      Source                       Results  5/11                      Blood                          pending  5/11                      MRSA Nasal               pending  5/11                      Urine                           pending  5/11                      Covid 19                      Neg    Ht Readings from Last 1 Encounters:   05/12/21 5' 11\" (1.803 m)        Wt Readings from Last 1 Encounters:   05/12/21 147 lb 7.8 oz (66.9 kg)       Body mass index is 20.57 kg/m². Estimated Creatinine Clearance: 174 mL/min (A) (based on SCr of 0.54 mg/dL (L)). Goal Trough Level: 15-20 mcg/mL    Assessment/Plan:  Will initiate Vancomycin with a one time loading dose of 1750 mg x1, followed by 1000 mg IV every 12 hours. Timing of trough level will be determined based on culture results, renal function, and clinical response. Thank you for the consult. Will continue to follow.    Jameson Geiger 88   5/12/2021  5:40 PM

## 2021-05-13 ENCOUNTER — ANESTHESIA (OUTPATIENT)
Dept: ENDOSCOPY | Age: 41
DRG: 890 | End: 2021-05-13
Payer: MEDICAID

## 2021-05-13 VITALS — OXYGEN SATURATION: 96 % | DIASTOLIC BLOOD PRESSURE: 36 MMHG | SYSTOLIC BLOOD PRESSURE: 74 MMHG

## 2021-05-13 PROBLEM — B37.81 CANDIDA ESOPHAGITIS (HCC): Status: ACTIVE | Noted: 2021-05-13

## 2021-05-13 PROBLEM — B25.9: Status: ACTIVE | Noted: 2021-05-13

## 2021-05-13 PROBLEM — B20 SYMPTOMATIC HIV INFECTION (HCC): Status: ACTIVE | Noted: 2021-05-13

## 2021-05-13 LAB
% NK (CD56): 8 % (ref 6–29)
AB NK (CD56): 8 /UL (ref 90–600)
ABSOLUTE CD 3: 62 /UL (ref 411–2061)
ABSOLUTE CD 4 HELPER: 24 /UL (ref 309–1571)
ABSOLUTE CD 8 (SUPP): 37 /UL (ref 282–999)
ABSOLUTE CD19 B CELL: 20 /UL (ref 71–567)
ALBUMIN SERPL-MCNC: 2.1 G/DL (ref 3.5–5.2)
ALBUMIN/GLOBULIN RATIO: ABNORMAL (ref 1–2.5)
ALLEN TEST: ABNORMAL
ALP BLD-CCNC: 88 U/L (ref 40–129)
ALT SERPL-CCNC: 40 U/L (ref 5–41)
ANION GAP SERPL CALCULATED.3IONS-SCNC: 8 MMOL/L (ref 9–17)
ANTI-NUCLEAR ANTIBODY (ANA): NEGATIVE
AST SERPL-CCNC: 71 U/L
BILIRUB SERPL-MCNC: 0.85 MG/DL (ref 0.3–1.2)
BILIRUBIN DIRECT: 0.62 MG/DL
BILIRUBIN, INDIRECT: 0.23 MG/DL (ref 0–1)
BUN BLDV-MCNC: 10 MG/DL (ref 6–20)
BUN/CREAT BLD: ABNORMAL (ref 9–20)
C. TRACHOMATIS DNA ,URINE: NEGATIVE
CALCIUM SERPL-MCNC: 7.4 MG/DL (ref 8.6–10.4)
CARBOXYHEMOGLOBIN: 0.9 % (ref 0–5)
CD19 B CELL: 21 % (ref 5–25)
CD3 % TOTAL T CELLS: 65 % (ref 57–94)
CD4 % HELPER T CELL: 25 % (ref 27–64)
CD4:CD8: 0.64 (ref 0.6–2.8)
CD8 % SUPPRESSOR T CELL: 39 % (ref 17–48)
CHLORIDE BLD-SCNC: 99 MMOL/L (ref 98–107)
CMV IGM: 1.7
CO2: 24 MMOL/L (ref 20–31)
CREAT SERPL-MCNC: 0.68 MG/DL (ref 0.7–1.2)
CULTURE: NORMAL
CYTOMEGALOVIRUS IGG ANTIBODY: 27.8
EBV EARLY ANTIGEN IGG: 24 U/ML
EBV INTERPRETATION: ABNORMAL
EBV NUCLEAR AG AB: 363 U/ML
EPSTEIN-BARR VCA IGG: 578 U/ML
EPSTEIN-BARR VCA IGM: 34 U/ML
FIO2: ABNORMAL
GFR AFRICAN AMERICAN: >60 ML/MIN
GFR NON-AFRICAN AMERICAN: >60 ML/MIN
GFR SERPL CREATININE-BSD FRML MDRD: ABNORMAL ML/MIN/{1.73_M2}
GFR SERPL CREATININE-BSD FRML MDRD: ABNORMAL ML/MIN/{1.73_M2}
GLIADIN DEAMINIDATED PEPTIDE AB IGA: 3.1 U/ML
GLIADIN DEAMINIDATED PEPTIDE AB IGG: 0.6 U/ML
GLOBULIN: ABNORMAL G/DL (ref 1.5–3.8)
GLUCOSE BLD-MCNC: 92 MG/DL (ref 70–99)
HCO3 ARTERIAL: 22.6 MMOL/L (ref 22–26)
HCT VFR BLD CALC: 27.6 % (ref 41–53)
HEMOGLOBIN: 9.3 G/DL (ref 13.5–17.5)
HIV INTERPRETATION: ABNORMAL
HIV-1 ANTIBODY: ABNORMAL
HIV-2 AB: NEGATIVE
IGA: 525 MG/DL (ref 70–400)
LACTATE DEHYDROGENASE: 418 U/L (ref 135–225)
LV EF: 53 %
LVEF MODALITY: NORMAL
LYMPHOCYTES # BLD: 2 % (ref 24–44)
Lab: NORMAL
MAGNESIUM: 2 MG/DL (ref 1.6–2.6)
MCH RBC QN AUTO: 30.5 PG (ref 26–34)
MCHC RBC AUTO-ENTMCNC: 33.6 G/DL (ref 31–37)
MCV RBC AUTO: 90.7 FL (ref 80–100)
METHEMOGLOBIN: 0.7 % (ref 0–1.9)
MITOCHONDRIAL ANTIBODY: 1.9 U/ML (ref 0–4)
MODE: ABNORMAL
MRSA, DNA, NASAL: NORMAL
N. GONORRHOEAE DNA, URINE: NEGATIVE
NEGATIVE BASE EXCESS, ART: 0.7 MMOL/L (ref 0–2)
NOTIFICATION TIME: ABNORMAL
NOTIFICATION: ABNORMAL
NRBC AUTOMATED: ABNORMAL PER 100 WBC
O2 DEVICE/FLOW/%: ABNORMAL
O2 SAT, ARTERIAL: 95.8 % (ref 95–98)
OXYHEMOGLOBIN: ABNORMAL % (ref 95–98)
PATHOLOGIST REVIEW: NORMAL
PATIENT TEMP: 37
PCO2 ARTERIAL: 29.5 MMHG (ref 35–45)
PCO2, ART, TEMP ADJ: ABNORMAL (ref 35–45)
PDW BLD-RTO: 15.4 % (ref 11.5–14.9)
PEEP/CPAP: ABNORMAL
PH ARTERIAL: 7.49 (ref 7.35–7.45)
PH, ART, TEMP ADJ: ABNORMAL (ref 7.35–7.45)
PLATELET # BLD: 194 K/UL (ref 150–450)
PMV BLD AUTO: 8.8 FL (ref 6–12)
PO2 ARTERIAL: 85.3 MMHG (ref 80–100)
PO2, ART, TEMP ADJ: ABNORMAL MMHG (ref 80–100)
POSITIVE BASE EXCESS, ART: ABNORMAL MMOL/L (ref 0–2)
POTASSIUM SERPL-SCNC: 3.5 MMOL/L (ref 3.7–5.3)
PROCALCITONIN: 18.94 NG/ML
PSV: ABNORMAL
PT. POSITION: ABNORMAL
RBC # BLD: 3.04 M/UL (ref 4.5–5.9)
RESPIRATORY RATE: 22
SAMPLE SITE: ABNORMAL
SET RATE: ABNORMAL
SODIUM BLD-SCNC: 131 MMOL/L (ref 135–144)
SPECIMEN DESCRIPTION: NORMAL
TEXT FOR RESPIRATORY: ABNORMAL
TISSUE TRANSGLUTAMINASE IGA: 1.3 U/ML
TOTAL HB: ABNORMAL G/DL (ref 12–16)
TOTAL PROTEIN: 5.4 G/DL (ref 6.4–8.3)
TOTAL RATE: ABNORMAL
VDRL, QUANTITATIVE: 16
VT: ABNORMAL
WBC # BLD: 4.8 K/UL (ref 3.5–11)
WBC # BLD: 6.8 K/UL (ref 3.5–11)

## 2021-05-13 PROCEDURE — 83615 LACTATE (LD) (LDH) ENZYME: CPT

## 2021-05-13 PROCEDURE — 80076 HEPATIC FUNCTION PANEL: CPT

## 2021-05-13 PROCEDURE — 99233 SBSQ HOSP IP/OBS HIGH 50: CPT | Performed by: INTERNAL MEDICINE

## 2021-05-13 PROCEDURE — 2580000003 HC RX 258: Performed by: NURSE PRACTITIONER

## 2021-05-13 PROCEDURE — 6370000000 HC RX 637 (ALT 250 FOR IP): Performed by: NURSE PRACTITIONER

## 2021-05-13 PROCEDURE — 2580000003 HC RX 258: Performed by: INTERNAL MEDICINE

## 2021-05-13 PROCEDURE — 2060000000 HC ICU INTERMEDIATE R&B

## 2021-05-13 PROCEDURE — 2500000003 HC RX 250 WO HCPCS: Performed by: INTERNAL MEDICINE

## 2021-05-13 PROCEDURE — 93306 TTE W/DOPPLER COMPLETE: CPT

## 2021-05-13 PROCEDURE — 6360000002 HC RX W HCPCS: Performed by: NURSE PRACTITIONER

## 2021-05-13 PROCEDURE — 82805 BLOOD GASES W/O2 SATURATION: CPT

## 2021-05-13 PROCEDURE — 0DB58ZX EXCISION OF ESOPHAGUS, VIA NATURAL OR ARTIFICIAL OPENING ENDOSCOPIC, DIAGNOSTIC: ICD-10-PCS | Performed by: INTERNAL MEDICINE

## 2021-05-13 PROCEDURE — 43239 EGD BIOPSY SINGLE/MULTIPLE: CPT | Performed by: INTERNAL MEDICINE

## 2021-05-13 PROCEDURE — 6360000002 HC RX W HCPCS: Performed by: NURSE ANESTHETIST, CERTIFIED REGISTERED

## 2021-05-13 PROCEDURE — 0DJD8ZZ INSPECTION OF LOWER INTESTINAL TRACT, VIA NATURAL OR ARTIFICIAL OPENING ENDOSCOPIC: ICD-10-PCS | Performed by: INTERNAL MEDICINE

## 2021-05-13 PROCEDURE — 3609027000 HC COLONOSCOPY: Performed by: INTERNAL MEDICINE

## 2021-05-13 PROCEDURE — 3700000000 HC ANESTHESIA ATTENDED CARE: Performed by: INTERNAL MEDICINE

## 2021-05-13 PROCEDURE — 36415 COLL VENOUS BLD VENIPUNCTURE: CPT

## 2021-05-13 PROCEDURE — 84145 PROCALCITONIN (PCT): CPT

## 2021-05-13 PROCEDURE — 45378 DIAGNOSTIC COLONOSCOPY: CPT | Performed by: INTERNAL MEDICINE

## 2021-05-13 PROCEDURE — 7100000001 HC PACU RECOVERY - ADDTL 15 MIN: Performed by: INTERNAL MEDICINE

## 2021-05-13 PROCEDURE — 83735 ASSAY OF MAGNESIUM: CPT

## 2021-05-13 PROCEDURE — 6360000002 HC RX W HCPCS: Performed by: INTERNAL MEDICINE

## 2021-05-13 PROCEDURE — 88312 SPECIAL STAINS GROUP 1: CPT

## 2021-05-13 PROCEDURE — 2500000003 HC RX 250 WO HCPCS: Performed by: NURSE ANESTHETIST, CERTIFIED REGISTERED

## 2021-05-13 PROCEDURE — 85027 COMPLETE CBC AUTOMATED: CPT

## 2021-05-13 PROCEDURE — 2580000003 HC RX 258: Performed by: ANESTHESIOLOGY

## 2021-05-13 PROCEDURE — 2709999900 HC NON-CHARGEABLE SUPPLY: Performed by: INTERNAL MEDICINE

## 2021-05-13 PROCEDURE — 88305 TISSUE EXAM BY PATHOLOGIST: CPT

## 2021-05-13 PROCEDURE — 80048 BASIC METABOLIC PNL TOTAL CA: CPT

## 2021-05-13 PROCEDURE — 3700000001 HC ADD 15 MINUTES (ANESTHESIA): Performed by: INTERNAL MEDICINE

## 2021-05-13 PROCEDURE — 6370000000 HC RX 637 (ALT 250 FOR IP): Performed by: INTERNAL MEDICINE

## 2021-05-13 PROCEDURE — 36600 WITHDRAWAL OF ARTERIAL BLOOD: CPT

## 2021-05-13 PROCEDURE — 7100000000 HC PACU RECOVERY - FIRST 15 MIN: Performed by: INTERNAL MEDICINE

## 2021-05-13 PROCEDURE — 3609012400 HC EGD TRANSORAL BIOPSY SINGLE/MULTIPLE: Performed by: INTERNAL MEDICINE

## 2021-05-13 RX ORDER — LIDOCAINE HYDROCHLORIDE 10 MG/ML
1 INJECTION, SOLUTION EPIDURAL; INFILTRATION; INTRACAUDAL; PERINEURAL
Status: DISCONTINUED | OUTPATIENT
Start: 2021-05-13 | End: 2021-05-13 | Stop reason: HOSPADM

## 2021-05-13 RX ORDER — SODIUM CHLORIDE 9 MG/ML
25 INJECTION, SOLUTION INTRAVENOUS PRN
Status: DISCONTINUED | OUTPATIENT
Start: 2021-05-13 | End: 2021-05-13 | Stop reason: HOSPADM

## 2021-05-13 RX ORDER — PROPOFOL 10 MG/ML
INJECTION, EMULSION INTRAVENOUS CONTINUOUS PRN
Status: DISCONTINUED | OUTPATIENT
Start: 2021-05-13 | End: 2021-05-13 | Stop reason: SDUPTHER

## 2021-05-13 RX ORDER — LIDOCAINE HYDROCHLORIDE 10 MG/ML
INJECTION, SOLUTION EPIDURAL; INFILTRATION; INTRACAUDAL; PERINEURAL PRN
Status: DISCONTINUED | OUTPATIENT
Start: 2021-05-13 | End: 2021-05-13 | Stop reason: SDUPTHER

## 2021-05-13 RX ORDER — SODIUM CHLORIDE 0.9 % (FLUSH) 0.9 %
10 SYRINGE (ML) INJECTION PRN
Status: DISCONTINUED | OUTPATIENT
Start: 2021-05-13 | End: 2021-05-13 | Stop reason: HOSPADM

## 2021-05-13 RX ORDER — SODIUM CHLORIDE 0.9 % (FLUSH) 0.9 %
10 SYRINGE (ML) INJECTION EVERY 12 HOURS SCHEDULED
Status: DISCONTINUED | OUTPATIENT
Start: 2021-05-13 | End: 2021-05-13 | Stop reason: HOSPADM

## 2021-05-13 RX ORDER — PROPOFOL 10 MG/ML
INJECTION, EMULSION INTRAVENOUS PRN
Status: DISCONTINUED | OUTPATIENT
Start: 2021-05-13 | End: 2021-05-13 | Stop reason: SDUPTHER

## 2021-05-13 RX ADMIN — SODIUM CHLORIDE: 9 INJECTION, SOLUTION INTRAVENOUS at 12:15

## 2021-05-13 RX ADMIN — CEFEPIME 2000 MG: 2 INJECTION, POWDER, FOR SOLUTION INTRAVENOUS at 15:15

## 2021-05-13 RX ADMIN — CEFEPIME 2000 MG: 2 INJECTION, POWDER, FOR SOLUTION INTRAVENOUS at 03:39

## 2021-05-13 RX ADMIN — AZITHROMYCIN DIHYDRATE 500 MG: 500 INJECTION, POWDER, LYOPHILIZED, FOR SOLUTION INTRAVENOUS at 00:51

## 2021-05-13 RX ADMIN — LIDOCAINE HYDROCHLORIDE 50 MG: 10 INJECTION, SOLUTION EPIDURAL; INFILTRATION; INTRACAUDAL; PERINEURAL at 13:13

## 2021-05-13 RX ADMIN — FLUCONAZOLE 200 MG: 200 INJECTION, SOLUTION INTRAVENOUS at 17:04

## 2021-05-13 RX ADMIN — SODIUM CHLORIDE: 9 INJECTION, SOLUTION INTRAVENOUS at 13:07

## 2021-05-13 RX ADMIN — POTASSIUM CHLORIDE 40 MEQ: 1500 TABLET, EXTENDED RELEASE ORAL at 17:06

## 2021-05-13 RX ADMIN — PROPOFOL 150 MCG/KG/MIN: 10 INJECTION, EMULSION INTRAVENOUS at 13:13

## 2021-05-13 RX ADMIN — ACETAMINOPHEN 650 MG: 325 TABLET, FILM COATED ORAL at 14:03

## 2021-05-13 RX ADMIN — ACETAMINOPHEN 650 MG: 325 TABLET, FILM COATED ORAL at 00:52

## 2021-05-13 RX ADMIN — SULFAMETHOXAZOLE AND TRIMETHOPRIM 334.4 MG: 80; 16 INJECTION, SOLUTION, CONCENTRATE INTRAVENOUS at 18:14

## 2021-05-13 RX ADMIN — VANCOMYCIN HYDROCHLORIDE 1000 MG: 1 INJECTION, POWDER, LYOPHILIZED, FOR SOLUTION INTRAVENOUS at 07:26

## 2021-05-13 RX ADMIN — SODIUM CHLORIDE, PRESERVATIVE FREE 10 ML: 5 INJECTION INTRAVENOUS at 21:14

## 2021-05-13 RX ADMIN — PROPOFOL 100 MG: 10 INJECTION, EMULSION INTRAVENOUS at 13:13

## 2021-05-13 ASSESSMENT — ENCOUNTER SYMPTOMS
COUGH: 1
SORE THROAT: 1
SHORTNESS OF BREATH: 1
DIARRHEA: 1
TROUBLE SWALLOWING: 1
ABDOMINAL PAIN: 0
STRIDOR: 0

## 2021-05-13 ASSESSMENT — PULMONARY FUNCTION TESTS
PIF_VALUE: 1
PIF_VALUE: 0
PIF_VALUE: 1
PIF_VALUE: 0
PIF_VALUE: 1
PIF_VALUE: 0
PIF_VALUE: 1
PIF_VALUE: 0
PIF_VALUE: 1
PIF_VALUE: 1

## 2021-05-13 ASSESSMENT — PAIN SCALES - GENERAL: PAINLEVEL_OUTOF10: 0

## 2021-05-13 ASSESSMENT — VISUAL ACUITY: OU: 1

## 2021-05-13 ASSESSMENT — LIFESTYLE VARIABLES: SMOKING_STATUS: 1

## 2021-05-13 ASSESSMENT — PAIN - FUNCTIONAL ASSESSMENT: PAIN_FUNCTIONAL_ASSESSMENT: 0-10

## 2021-05-13 NOTE — OP NOTE
PROCEDURE NOTE    DATE OF PROCEDURE: 5/13/2021     SURGEON: Jeremías Baez MD  Facility: [facility]  ASSISTANT: None  Anesthesia: MAc   PREOPERATIVE DIAGNOSIS:   Thrush  Abdominal pain  Anemia    Diagnosis:  Significant esophagitis involving the entire esophagus with white thick cottage cheese type discharge most consistent with Candida biopsies were taken      POSTOPERATIVE DIAGNOSIS: As described below    OPERATION: Upper GI endoscopy with Biopsy    ANESTHESIA: Moderate Sedation     ESTIMATED BLOOD LOSS: Less than 50 ml    COMPLICATIONS: None. SPECIMENS:  Was Obtained:   ass above     HISTORY: The patient is a 45y.o. year old male with history of above preop diagnosis. I recommended esophagogastroduodenoscopy with possible biopsy and I explained the risk, benefits, expected outcome, and alternatives to the procedure. Risks included but are not limited to bleeding, infection, respiratory distress, hypotension, and perforation of the esophagus, stomach, or duodenum. Patient understands and is in agreement. The patient was counseled at length about the risks of claribel Covid-19 during their perioperative period and any recovery window from their procedure. The patient was made aware that claribel Covid-19  may worsen their prognosis for recovering from their procedure  and lend to a higher morbidity and/or mortality risk. All material risks, benefits, and reasonable alternatives including postponing the procedure were discussed. The patient does wish to proceed with the procedure at this time. PROCEDURE: The patient was given IV conscious sedation. The patient's SPO2 remained above 90% throughout the procedure. The gastroscope was inserted orally and advanced under direct vision through the esophagus, through the stomach, through the pylorus, and into the descending duodenum. Post sedation note : The patient's SPO2 remained above 90% throughout the procedure. the vital signs remained stable , and no immediate complication form the procedure noted, patient will be ready for d/c when criteria is met . Findings:    Retropharyngeal area was grossly normal appearing    Esophagus: abnormal: Significant esophagitis involving the entire esophagus with white thick cottage cheese type discharge most consistent with Candida biopsies were taken    Stomach:    Fundus: normal    Body: normal    Antrum: normal    Duodenum:     Descending: normal    Bulb: normal    The scope was removed and the patient tolerated the procedure well. Recommendations/Plan:   1. F/U Biopsies  2. Diflucan  3.  Colonoscopy    Electronically signed by Meenu Dempsey MD  on 5/13/2021 at 1:17 PM

## 2021-05-13 NOTE — PROGRESS NOTES
Pharmacy to dose Bactrim IV    Pharmacy Note  Pharmacy Consult    Jesse Mera is a 45 y.o. male ordered Bactrim IV for coverage of PCP pneumonia; consult received from Dr. Stephanie Nye to manage therapy. Also receiving cefepime, azithromycin, fluconazole. Patient Active Problem List   Diagnosis    Pneumonia due to organism    Tobacco use    Weight loss    Dehydration    Hyponatremia    Anemia    Elevated LFTs    Constipation       Allergies:  Bee venom     Temp max: 102.7F    Recent Labs     05/12/21  0546 05/13/21  0533   BUN 13 10       Recent Labs     05/12/21  0546 05/13/21  0533   CREATININE 0.54* 0.68*       Recent Labs     05/12/21  1545 05/13/21  0533   WBC 4.8 6.8         Intake/Output Summary (Last 24 hours) at 5/13/2021 1446  Last data filed at 5/13/2021 1420  Gross per 24 hour   Intake 500 ml   Output 1025 ml   Net -525 ml       Culture Date      Source                       Results  5/11 - blood x 2 - pending    Ht Readings from Last 1 Encounters:   05/13/21 5' 11\" (1.803 m)        Wt Readings from Last 1 Encounters:   05/13/21 147 lb 7.8 oz (66.9 kg)         Estimated Creatinine Clearance: 138 mL/min (A) (based on SCr of 0.68 mg/dL (L)). Assessment/Plan:  Dr Stephanie Nye wanting coverage for PCP pneumonia. Patient CD 4 count is low at 24. Will initiate Bactrim IV at 5 mg/kg (TMP)  - 334 mg IV every 8 hours. Monitor renal function. Thank you for the consult. Pharmacy will continue to follow per policy. Stuart Car, Pharm. [address] [facility]

## 2021-05-13 NOTE — ANESTHESIA PRE PROCEDURE
Department of Anesthesiology  Preprocedure Note       Name:  Abhijeet Santana   Age:  45 y.o.  :  1980                                          MRN:  [id number]         Date:  2021      Surgeon: Morena Galvez):  Kamilah Tanner MD    Procedure: Procedure(s):  EGD  COLONOSCOPY DIAGNOSTIC    Medications prior to admission:   Prior to Admission medications    Not on File       Current medications:    Current Facility-Administered Medications   Medication Dose Route Frequency Provider Last Rate Last Admin    sodium chloride flush 0.9 % injection 10 mL  10 mL Intravenous 2 times per day Zenaida Bermudez MD        sodium chloride flush 0.9 % injection 10 mL  10 mL Intravenous PRN Zenaida Bermudez MD        0.9 % sodium chloride infusion  25 mL Intravenous PRN Zenaida Bermudez MD        lidocaine PF 1 % injection 1 mL  1 mL Intradermal Once PRN MD Anita Lo [facility] Hold] perflutren lipid microspheres (DEFINITY) injection 2.2 mg  2 mL Intravenous ONCE PRN Sheryle Gibes, MD        [facility] Hold] sodium chloride flush 0.9 % injection 5-40 mL  5-40 mL Intravenous 2 times per day Nedra Eriberto APRN - CNP   10 mL at 21 2200    [MAR Hold] sodium chloride flush 0.9 % injection 10 mL  10 mL Intravenous PRN Nedra Netbony APRN - CNP        [facility] Hold] 0.9 % sodium chloride infusion  25 mL Intravenous PRN Nedra Netbony, APRN - CNP        [MAR Hold] potassium chloride (KLOR-CON M) extended release tablet 40 mEq  40 mEq Oral PRN Nedra Nettle, APRN - CNP        Or    [MAR Hold] potassium bicarb-citric acid (EFFER-K) effervescent tablet 40 mEq  40 mEq Oral PRN Nedra Nettle, APRN - CNP        Or   [facility] Hold] potassium chloride 10 mEq/100 mL IVPB (Peripheral Line)  10 mEq Intravenous PRN Nedra Netbony, APRN - CNP        [MAR Hold] magnesium sulfate 1000 mg in dextrose 5% 100 mL IVPB  1,000 mg Intravenous PRN Nedra Netbony, APRN - CNP        [Held by provider] enoxaparin (LOVENOX) injection 40 mg  40 mg Subcutaneous Daily Nedra Netbony APRN - CNP  [MAR Hold] promethazine (PHENERGAN) tablet 12.5 mg  12.5 mg Oral Q6H PRN NACHO Izaguirre - CNP        Or    [MAR Hold] ondansetron (ZOFRAN) injection 4 mg  4 mg Intravenous Q6H PRN NACHO Izaguirre - [facility]        [MAR Hold] polyethylene glycol (GLYCOLAX) packet 17 g  17 g Oral Daily PRN NACHO Izaguirre - [facility]        [MAR Hold] acetaminophen (TYLENOL) tablet 650 mg  650 mg Oral Q6H PRN NACHO Izaguirre - CNP   650 mg at 05/13/21 0052    Or    [MAR Hold] acetaminophen (TYLENOL) suppository 650 mg  650 mg Rectal Q6H PRN NACHO Izaguirre - [facility]        [MAR Hold] benzonatate (TESSALON) capsule 100 mg  100 mg Oral TID PRN NACHO Izaguirre - [facility] Hold] 0.9 % sodium chloride infusion   Intravenous Continuous NACHO Izaguirre - CNP 75 mL/hr at 05/12/21 1702 New Bag at 05/12/21 1702    [MAR Hold] azithromycin (ZITHROMAX) 500 mg in D5W 250ml addavial  500 mg Intravenous Q24H NACHO Izaguirre - CNP   Stopped at 05/13/21 0151    [MAR Hold] nicotine (NICODERM CQ) 21 MG/24HR 1 patch  1 patch Transdermal Daily NACHO Izaguirre - [facility]        [MAR Hold] fluconazole (DIFLUCAN) tablet 200 mg  200 mg Oral Daily Randall Mcallister MD   200 mg at 05/12/21 1111    [MAR Hold] therapeutic multivitamin-minerals 1 tablet  1 tablet Oral Daily Randall Mcallister MD   1 tablet at 05/12/21 1111    [MAR Hold] ondansetron (ZOFRAN) injection 4 mg  4 mg Intravenous Once NACHO Chou [facility] Hold] sodium chloride flush 0.9 % injection 10 mL  10 mL Intravenous PRN Liu Gallegso MD   10 mL at 05/12/21 1349    [MAR Hold] cefepime (MAXIPIME) 2000 mg IVPB minibag  2,000 mg Intravenous Q12H Colonel Deanna MD   Stopped at 05/13/21 0437    [MAR Hold] 0.9 % sodium chloride infusion  25 mL Intravenous PRN Ludy Marquis MD           Allergies:     Allergies   Allergen Reactions    Bee Venom        Problem List:    Patient Active Problem List   Diagnosis Code    Pneumonia due to organism J18.9    Tobacco use Z72.0    Weight loss R63.4    Dehydration E86.0    Hyponatremia E87.1    Anemia D64.9    Elevated LFTs R79.89    Constipation K59.00       Past Medical History:        Diagnosis Date    Hydronephrosis 2015    Pt unsure of dx time but believes about 6 yrs ago    Kidney stone        Past Surgical History:        Procedure Laterality Date    APPENDECTOMY  2005    Pt believes when he was about 25yrs        Social History:    Social History     Tobacco Use    Smoking status: Current Every Day Smoker     Packs/day: 0.50     Types: Cigarettes    Smokeless tobacco: Never Used    Tobacco comment: HAsn't craved for past 5 months   Substance Use Topics    Alcohol use: Not Currently                                Ready to quit: Yes  Counseling given: Not Answered  Comment: HAsn't craved for past 5 months      Vital Signs (Current):   Vitals:    05/12/21 1530 05/12/21 1954 05/13/21 0017 05/13/21 0800   BP: 98/67 125/89 120/82 96/73   Pulse: 84 101 106 85   Resp: 19 18 18 16   Temp: 97.9 °F (36.6 °C) 99.9 °F (37.7 °C) 103.1 °F (39.5 °C) 97.9 °F (36.6 °C)   TempSrc: Oral Oral  Oral   SpO2: 98% 97% 97% 96%   Weight:   147 lb 7.8 oz (66.9 kg)    Height:   5' 11\" (1.803 m)                                               BP Readings from Last 3 Encounters:   05/13/21 96/73       NPO Status:                                                                                 BMI:   Wt Readings from Last 3 Encounters:   05/13/21 147 lb 7.8 oz (66.9 kg)     Body mass index is 20.57 kg/m².     CBC:   Lab Results   Component Value Date    WBC 6.8 05/13/2021    RBC 3.04 05/13/2021    HGB 9.3 05/13/2021    HCT 27.6 05/13/2021    MCV 90.7 05/13/2021    RDW 15.4 05/13/2021     05/13/2021       CMP:   Lab Results   Component Value Date     05/13/2021    K 3.5 05/13/2021    CL 99 05/13/2021    CO2 24 05/13/2021    BUN 10 05/13/2021    CREATININE 0.68 05/13/2021    GFRAA >60 05/13/2021    LABGLOM >60 05/13/2021    GLUCOSE 92 05/13/2021    PROT 5.4 05/13/2021    CALCIUM 7.4 05/13/2021    BILITOT 0.85 05/13/2021    ALKPHOS 88 05/13/2021    AST 71 05/13/2021    ALT 40 05/13/2021       POC Tests: No results for input(s): POCGLU, POCNA, POCK, POCCL, POCBUN, POCHEMO, POCHCT in the last 72 hours. Coags:   Lab Results   Component Value Date    PROTIME 14.6 05/11/2021    INR 1.1 05/11/2021    APTT 41.4 05/11/2021       HCG (If Applicable): No results found for: PREGTESTUR, PREGSERUM, HCG, HCGQUANT     ABGs: No results found for: PHART, PO2ART, XBJ7VIH, VYQ2ZRH, BEART, E8HUTTVB     Type & Screen (If Applicable):  No results found for: LABABO, LABRH    Drug/Infectious Status (If Applicable):  Lab Results   Component Value Date    HEPCAB NONREACTIVE 05/12/2021       COVID-19 Screening (If Applicable):   Lab Results   Component Value Date    COVID19 Not Detected 05/12/2021           Anesthesia Evaluation  Patient summary reviewed and Nursing notes reviewed no history of anesthetic complications:   Airway: Mallampati: II  TM distance: >3 FB   Neck ROM: full  Mouth opening: > = 3 FB Dental:          Pulmonary: breath sounds clear to auscultation  (+) pneumonia:  current smoker    (-) rhonchi, wheezes, rales and stridor                           Cardiovascular:    (+) hypertension: no interval change,     (-) murmur, weak pulses,  friction rub, systolic click, carotid bruit,  JVD and peripheral edema      Rhythm: regular  Rate: normal                    Neuro/Psych:               GI/Hepatic/Renal:   (+) renal disease: kidney stones,           Endo/Other:    (+) blood dyscrasia: anemia:., .                  ROS comment: HIV Abdominal:           Vascular: negative vascular ROS. Anesthesia Plan      MAC     ASA 3       Induction: intravenous. Anesthetic plan and risks discussed with patient. Plan discussed with CRNA.                   Jaskaran Leal MD   5/13/2021

## 2021-05-13 NOTE — CONSULTS
Gastroenterology Consult Note      Patient: Saurabh Harrison  : 1980  Acct#:  [id number]     Date:  2021    Subjective:       History of Present Illness  Patient is a 45 y.o.  male admitted with Pneumonia [J18.9] who is seen in consult for *thrush rule out esophagitis, weight loss, elevated liver enzymes  Anemia    Patient is admitted with fever dizziness decreased appetite constipation and cough  Diagnosed with pneumonia on antibiotics  We are consulted because he had thrush and to rule out Candida esophagitis  The patient was on Z-Aristeo a month ago  Been having weight loss he lost at least 30 pounds been having night sweats  Been constipated for long time  Been taking over-the-counter suppository  He had stool which was black but small and hard  And after that he started having diarrhea but there is no hematochezia there is no hematemesis  He did admit to taking ibuprofen 4 tablets 3-4 times a week  CT scan was ordered not done yet. His labs showed ALT of 64 AST of 94 sodium 130 hemoglobin 10.1 normal lipase  Chest x-ray showing small airway disease and reticulonodular interstitial disease  UA was positive for bilirubin and moderate hemoglobin and bacteria  Other than that there is no other GI symptoms    No previous endoscopies          Past Medical History:   Diagnosis Date    Hydronephrosis     Pt unsure of dx time but believes about 6 yrs ago    Kidney stone       Past Surgical History:   Procedure Laterality Date    APPENDECTOMY      Pt believes when he was about 25yrs       Past Endoscopic History as above    Admission Meds  No current facility-administered medications on file prior to encounter. No current outpatient medications on file prior to encounter.        Patient   Does Use ASA, NSAID Yes  Allergies  Allergies   Allergen Reactions    Bee Venom         Social   Social History     Tobacco Use    Smoking status: Current Every Day Smoker     Packs/day: 0.50     Types: Cigarettes    Smokeless tobacco: Never Used    Tobacco comment: HAsn't craved for past 5 months   Substance Use Topics    Alcohol use: Not Currently        PSYCH HISTORY:  Depression No  Anxiety No  Suicide No       History reviewed. No pertinent family history. No family history of colon cancer, Crohn's disease, or ulcerative colitis. Review of Systems  Constitutional: negative  Eyes: negative  Ears, nose, mouth, throat, and face: negative  Respiratory: negative  Cardiovascular: negative  Gastrointestinal: negative  Genitourinary:negative  Integument/breast: negative  Hematologic/lymphatic: negative  Musculoskeletal:negative  Endocrine: negative           Physical Exam  Blood pressure 125/89, pulse 101, temperature 99.9 °F (37.7 °C), temperature source Oral, resp. rate 18, height 5' 11\" (1.803 m), weight 147 lb 7.8 oz (66.9 kg), SpO2 97 %.          General Appearance: alert and oriented to person, place and time, well-developed and well-nourished, in no acute distress  Skin: warm and dry, no rash or erythema  Head: normocephalic and atraumatic  Eyes: pupils equal, round, and reactive to light, extraocular eye movements intact, conjunctivae normal  ENT: hearing grossly normal bilaterally  Neck: neck supple and non tender without mass, no thyromegaly or thyroid nodules, no cervical lymphadenopathy   Pulmonary/Chest: clear to auscultation bilaterally- no wheezes, rales or rhonchi, normal air movement, no respiratory distress  Cardiovascular: normal rate, regular rhythm, normal S1 and S2, no murmurs, rubs, clicks or gallops, distal pulses intact, no carotid bruits  Abdomen: soft, non-tender, non-distended, normal bowel sounds, no masses or organomegaly  Extremities: no cyanosis, clubbing or edema  Musculoskeletal: normal range of motion, no joint swelling, deformity or tenderness  Neurologic: no cranial nerve deficit and muscle strength normal    Data Review:    Recent Labs     05/11/21  1154 WBC 5.2   HGB 10.1*   HCT 29.8*   MCV 90.2        Recent Labs     05/11/21  2344 05/12/21  0546   * 130*   K 4.5 3.7   CL 91* 100   CO2 25 21   BUN 14 13   CREATININE 0.78 0.54*     Recent Labs     05/11/21  2344   AST 94*   ALT 64*   BILITOT 1.05   ALKPHOS 121     Recent Labs     05/11/21  2344   LIPASE 84*     Recent Labs     05/11/21  2344   PROTIME 14.6   INR 1.1     No results for input(s): PTT in the last 72 hours. No results for input(s): OCCULTBLD in the last 72 hours. CEA:  No results found for: CEA  Ca 125:  No results found for:   Ca 19-9:  No results found for:   Ca 15-3:  No results found for:   AFP:  No components found for: AFAFP  Beta HCG:  No components found for: BHCG  Neuron Specific Enolase:  No results found for: NSE  Imaging Studies:                           All appropriate imaging studies and reports reviewed: Yes                 Assessment:     Principal Problem:    Pneumonia due to organism  Active Problems:    Tobacco use    Weight loss    Dehydration    Hyponatremia    Anemia    Elevated LFTs    Constipation  Resolved Problems:    * No resolved hospital problems. *    *Elevated liver enzymes  Anemia  Weight loss  Night sweats  Thrush in the mouth rule out esophagitis  Elevated lipase  Pneumonia and hyponatremia    Recommendations:   EGD/colonoscopy tomorrow because of the anemia and the weight loss and to rule out Candida esophagitis  Anemia profile  CT scan of the abdomen  Liver diseases work-up  Antibiotics are per ID                      Thank you for allowing me to participate in the care of your patient. Please feel free to contact me with any questions or concerns.      Perez Hillman MD

## 2021-05-13 NOTE — PROGRESS NOTES
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 difficile negative  · Urine for Legionella antigen negative  · Urine for GC and Chlamydia DNA pending  · COVID-19 rapid test negative  · Follow blood cultures  · GI following        History of Present Illness:   Initial history:  Jesse Mera is a 45y.o.-year-old male presented to hospital with worsening shortness of breath associated with cough productive white phlegm, generalized weakness and subjective fever for 4 months, gradual onset, continues, moderate in severity, no alleviating or aggravating factors. Patient also had decreased appetite, dizziness, significant weight loss. He was constipated initially then developed diarrhea after stool softeners. He also had thrush, tried several over-the-counter treatments with no improvement  He is homosexual, had multiple partners in the past, none of his previous partners has been sick to his knowledge, denied recent travel or sick contact, has not been sexually active since January.   Patient was tested negative for HIV and COVID-19 infection as outpatient according to the patient. Work-up showed sodium of 126, liver enzymes elevated AST 94, ALT 64  CT chest showed cavitary and noncavitary lung nodules/multiple splenic hypodensities  Urinalysis showed to moderate hemoglobin, negative nitrate, negative leukocyte esterase  Lactic acid was 1  Lipase was 84  Interval changes  5/13/2021   He still feeling sick, tired, productive cough and thrush, denied fever or chills, no new complaints  HIV screen was positive  Patient Vitals for the past 8 hrs:   BP Temp Temp src Pulse Resp SpO2   05/13/21 0800 96/73 97.9 °F (36.6 °C) Oral 85 16 96 %         I have personally reviewed the past medical history, past surgical history, medications, social history, and family history, and I haveupdated the database accordingly. Allergies:   Bee venom     Review of Systems:     Review of Systems  Other than above 12 system review was negative  Physical Examination :       Physical Exam  Constitutional:       General: He is not in acute distress. Appearance: He is ill-appearing. HENT:      Head: Normocephalic and atraumatic. Right Ear: External ear normal.      Left Ear: External ear normal.      Nose: No congestion or rhinorrhea. Mouth/Throat:      Mouth: Mucous membranes are dry. Comments: Thrush  Eyes:      General: No scleral icterus. Conjunctiva/sclera: Conjunctivae normal.   Neck:      Musculoskeletal: Neck supple. No neck rigidity. Cardiovascular:      Rate and Rhythm: Normal rate and regular rhythm. Heart sounds: No murmur. Pulmonary:      Effort: No respiratory distress. Breath sounds: Rhonchi present. No wheezing. Abdominal:      General: There is no distension. Palpations: Abdomen is soft. Tenderness: There is no abdominal tenderness. Musculoskeletal:      Right lower leg: No edema. Left lower leg: No edema. Skin:     General: Skin is dry. Coloration: Skin is pale. Skin is not jaundiced.    Neurological:      General: No focal deficit present. Mental Status: He is oriented to person, place, and time.    Psychiatric:         Mood and Affect: Mood normal.         Behavior: Behavior normal.         Past Medical History:     Past Medical History:   Diagnosis Date    Hydronephrosis 2015    Pt unsure of dx time but believes about 6 yrs ago    Kidney stone        Past Surgical  History:     Past Surgical History:   Procedure Laterality Date    APPENDECTOMY  2005    Pt believes when he was about 25yrs        Medications:      sodium chloride flush  5-40 mL Intravenous 2 times per day    [Held by provider] enoxaparin  40 mg Subcutaneous Daily    azithromycin  500 mg Intravenous Q24H    nicotine  1 patch Transdermal Daily    fluconazole  200 mg Oral Daily    therapeutic multivitamin-minerals  1 tablet Oral Daily    ondansetron  4 mg Intravenous Once    cefepime  2,000 mg Intravenous Q12H    vancomycin (VANCOCIN) intermittent dosing (placeholder)   Other RX Placeholder    vancomycin  1,000 mg Intravenous Q12H       Social History:     Social History     Socioeconomic History    Marital status: Single     Spouse name: Not on file    Number of children: Not on file    Years of education: Not on file    Highest education level: Not on file   Occupational History    Not on file   Social Needs    Financial resource strain: Not on file    Food insecurity     Worry: Not on file     Inability: Not on file    Transportation needs     Medical: Not on file     Non-medical: Not on file   Tobacco Use    Smoking status: Current Every Day Smoker     Packs/day: 0.50     Types: Cigarettes    Smokeless tobacco: Never Used    Tobacco comment: HAsn't craved for past 5 months   Substance and Sexual Activity    Alcohol use: Not Currently    Drug use: Not Currently     Types: Marijuana     Comment: Feburary 2021 last time     Sexual activity: Not Currently     Partners: Male   Lifestyle    Physical activity     Days per week: Not on file     Minutes per session: Not on file    Stress: Not on file   Relationships    Social connections     Talks on phone: Not on file     Gets together: Not on file     Attends Buddhism service: Not on file     Active member of club or organization: Not on file     Attends meetings of clubs or organizations: Not on file     Relationship status: Not on file    Intimate partner violence     Fear of current or ex partner: Not on file     Emotionally abused: Not on file     Physically abused: Not on file     Forced sexual activity: Not on file   Other Topics Concern    Not on file   Social History Narrative    Not on file       Family History:   History reviewed. No pertinent family history. Medical Decision Making:   I have independently reviewed/ordered the following labs:    CBC with Differential:   Recent Labs     05/11/21  2344 05/13/21  0533   WBC 5.2 6.8   HGB 10.1* 9.3*   HCT 29.8* 27.6*    194   LYMPHOPCT 3*  --    MONOPCT 3  --      BMP:  Recent Labs     05/12/21  0546 05/13/21  0533   * 131*   K 3.7 3.5*    99   CO2 21 24   BUN 13 10   CREATININE 0.54* 0.68*   MG  --  2.0     Hepatic Function Panel:   Recent Labs     05/11/21  2344 05/13/21  0533   PROT 7.2 5.4*   LABALBU 2.9* 2.1*   BILIDIR  --  0.62*   IBILI  --  0.23   BILITOT 1.05 0.85   ALKPHOS 121 88   ALT 64* 40   AST 94* 71*     No results for input(s): RPR in the last 72 hours. No results for input(s): HIV in the last 72 hours. No results for input(s): BC in the last 72 hours. Lab Results   Component Value Date    CREATININE 0.68 05/13/2021    GLUCOSE 92 05/13/2021       Detailed results: Thank you for allowing us to participate in the care of this patient. Please call with questions.     This note is created with the assistance of a speech recognition program.  While intending to generate adocument that actually reflects the content of the visit, the document can still have some errors including those of syntax and sound a like substitutions which may escape proof reading. It such instances, actual meaningcan be extrapolated by contextual diversion.     Cora Dela Cruz MD  Office: [phone]  Perfect serve / office [phone]

## 2021-05-13 NOTE — OP NOTE
Operative Note    PROCEDURE NOTE    DATE OF PROCEDURE: 5/13/2021    SURGEON: Perez Hillman MD  Facility : [facility]  ASSISTANT: None  Anesthesia: MAc   PREOPERATIVE DIAGNOSIS:   Anemia    POSTOPERATIVE DIAGNOSIS: as described below    OPERATION: Total colonoscopy     ANESTHESIA: Moderate Sedation    ESTIMATED BLOOD LOSS: less than 50     COMPLICATIONS: None. SPECIMENS:  Was Not Obtained    HISTORY: The patient is a 45y.o. year old male with history of above preop diagnosis. I recommended colonoscopy with possible biopsy or polypectomy and I explained the risk, benefits, expected outcome, and alternatives to the procedure. Risks included but are not limited to bleeding, infection, respiratory distress, hypotension, and perforation of the colon and possibility of missing a lesion. The patient understands and is in agreement. The patient was counseled at length about the risks of claribel Covid-19 during their perioperative period and any recovery window from their procedure. The patient was made aware that claribel Covid-19  may worsen their prognosis for recovering from their procedure  and lend to a higher morbidity and/or mortality risk. All material risks, benefits, and reasonable alternatives including postponing the procedure were discussed. The patient does wish to proceed with the procedure at this time. PROCEDURE: The patient was given IV conscious sedation. The patient's SPO2 remained above 90% throughout the procedure. The colonoscope was inserted per rectum and advanced under direct vision to the cecum without difficulty. Post sedation note : The patient's SPO2 remained above 90% throughout the procedure. the vital signs remained stable , and no immediate complication form the procedure noted, patient will be ready for d/c when criteria is met . The prep was poor.       Findings:  Terminal ileum: normal in the last 2 cm  Cecum/Ascending colon: normal    Transverse colon: normal    Descending/Sigmoid colon: normal    Rectum/Anus: examined in normal and retroflexed positions and was abnormal: Low anal tone, minor hemorrhoids    Withdrawal Time was (minutes): 10    The colon was decompressed and the scope was removed. The patient tolerated the procedure well. Recommendations/Plan:   1.  Resume diet    Electronically signed by Tricia Paris MD  on 5/13/2021 at 1:30 PM

## 2021-05-13 NOTE — ANESTHESIA POSTPROCEDURE EVALUATION
POST- ANESTHESIA EVALUATION       Pt Name: Pan Sánchez  MRN: [id number]  [place]: 1980  Date of evaluation: 5/13/2021  Time:  2:01 PM      BP (!) 85/45   Pulse 103   Temp 102.2 °F (39 °C) (Infrared)   Resp 20   Ht 5' 11\" (1.803 m)   Wt 147 lb 7.8 oz (66.9 kg)   SpO2 99%   BMI 20.57 kg/m²      Consciousness Level  Awake  Cardiopulmonary Status  Stable  Pain Adequately Treated YES  Nausea / Vomiting  NO  Adequate Hydration  YES  Anesthesia Related Complications NONE      Electronically signed by Mark Harrell MD on 5/13/2021 at 2:01 PM       Department of Anesthesiology  Postprocedure Note    Patient: Pan Sánchez  MRN: [id number]  YOB: 1980  Date of evaluation: 5/13/2021  Time:  2:01 PM     Procedure Summary     Date: 05/13/21 Room / Location: [address] [place] [address] 01 / [facility] ENDO    Anesthesia Start: 0223 Anesthesia Stop: 4721    Procedures:       EGD BIOPSY (N/A Esophagus)      COLONOSCOPY DIAGNOSTIC (N/A ) Diagnosis:       (ANEMIA)      (WEIGHT LOSS)    Surgeons: Rosita Galan MD Responsible Provider: Mark Harrell MD    Anesthesia Type: MAC ASA Status: 3          Anesthesia Type: MAC    Lion Phase I: Lion Score: 4    Lion Phase II:      Last vitals: Reviewed and per EMR flowsheets.        Anesthesia Post Evaluation

## 2021-05-13 NOTE — PROGRESS NOTES
IM resident informed to take over care. Electronically signed by Ruthann Burrell RN on 5/13/2021 at 9:48 AM   Dr Robert Brady spoke with patient, reviewed some test results while still waiting for others. Patient agreeable to be transferred to IM resident service.

## 2021-05-13 NOTE — CARE COORDINATION
CASE MANAGEMENT NOTE:    Admission Date:  5/11/2021 Diana Amor is a 45 y.o.  male    Admitted for : Pneumonia [J18.9]    Met with:  Patient    PCP:  None, Denies Assist to find one, informed to let writer know if needs change                                Insurance:  [employer]      Is patient alert and oriented at time of discussion:  Yes    Current Residence/ Living Arrangements:  independently at home , Lives w/ Friend Chanell & Her Daughter            Current Services PTA:  No    Does patient go to outpatient dialysis: No  If yes, location and chair time: NA    Is patient agreeable to VNS: No    Freedom of choice provided:  Yes    List of [address] provided: No    VNS chosen:  No,Denies need at this time, will follow    DME:  none    Home Oxygen: No    Nebulizer: No    CPAP/BIPAP: No    Supplier: N/A    Potential Assistance Needed: Follow for any rec/needs    SNF needed: No    Freedom of choice and list provided: NA    Pharmacy:  [facility]       Does Patient want to use MEDS to BEDS? No    Is patient currently receiving oral anticoagulation therapy? No    Is the Patient an [facility] with Readmission Risk Score greater than 14%? No  If yes, pt needs a follow up appointment made within 7 days. Family Members/Caregivers that pt would like involved in their care:    Yes    If yes, list name here:  Sister, Marla Hayes, Sister, Earlene    Transportation Provider:  Patient             Discharge Plan:  5/13/21 [facility] Pt. Lives in 1 Story home w/ Friend, Chanell & Daughter. No DME. Denies VNS, No PCP, denies assist to find one. ID/Pulmonary on board. IV Diflucan/Bactrim. EGD/Colon today, GI following.  Follow closely for any needs//KB                Electronically signed by: Janis Calle RN on 5/13/2021 at 3:03 PM

## 2021-05-13 NOTE — PROGRESS NOTES
[address] [facility].    PROGRESS NOTE             5/13/2021    3:53 PM    Name:   Darling Santana  MRN:     [id number]     Acct:      [de-identified]   Room:   2098/2098-01  IP Day:  1  Admit Date:  5/11/2021 10:51 PM    PCP:  No primary care provider on file. Code Status:  Full Code    Subjective:     C/C:   Chief Complaint   Patient presents with    Fever     Pt states fever, dizziness, dehydration, no appetite, constipation, weight loss, and several other complaints. Interval History Status: improved. Patient seen and examined at bedside. Did spike a fever of 103.1 around midnight last night, last fever of 102.7 around 1410 today. Other vitals within normal limits. Breathing room air. Discussed patient's diagnosis at bedside, patient verbalized understanding. Will continue further workup, Started on IV Bactrim today per ID. Will await pulmonology recs, patient will need bronchoscopy. EGD done today showed candida esophagitis, started on IV Diflucan. Brief History:     45year old male with no significant past medical history presented due to fever, night sweats, dry cough and shortness of breath that all began in January of 2021. Patient is homosexual, has sex with men. States he went to an urgent care clinic and was given a z pack in late January. Symptoms improved a little bit, but came back shortly after. States over the past month his cough has been worsening and he has been getting fevers and night sweats every day. Has had a 20-30 lb weight loss in the past 5 months. Complaining of decreased appetite as well. States he has been tested for HIV in the past and has always been negative. Patient also has Oral Thrush. CXR done in the ER showed small airway disease and reticulonodular interstitial disease.  CT Chest Abdomen Pelvis done on 05/12 showing innumerable cavitating and non cavitating mostly sub centimeter pulmonary nodules and tree-in-bud nodules noted diffusely throughout the lungs, some are centrilobular in distribution. Trace pleural effusion. Multiple splenic hypo densities with ill defined margins up to 1 cm in size likely micro abscesses. ID and GI consulted. Patient started on Vancomycin, Cefepime and Azithromycin. HIV screen positive. EGD done on 05/13 showing candida esophagitis, started on IV Diflucan. Vancomycin discontinued due to negative MRSA swab. Patient started on IV Bactrim on 05/13. Review of Systems:     Review of Systems   Constitutional: Positive for appetite change, chills, diaphoresis, fatigue, fever and unexpected weight change. HENT: Positive for sore throat and trouble swallowing. Negative for congestion. Eyes: Negative for visual disturbance. Respiratory: Positive for cough and shortness of breath. Cardiovascular: Negative for chest pain. Gastrointestinal: Positive for diarrhea. Negative for abdominal pain. Endocrine: Negative for polyuria. Genitourinary: Negative for dysuria. Skin: Positive for rash (on face). Neurological: Negative for weakness and numbness. All other systems reviewed and are negative. Medications: Allergies:     Allergies   Allergen Reactions    Bee Venom        Current Meds:   Scheduled Meds:    fluconazole  200 mg Intravenous Q24H    sulfamethoxazole-trimethoprim (BACTRIM) IVPB  5 mg/kg Intravenous Q8H    sodium chloride flush  5-40 mL Intravenous 2 times per day    [Held by provider] enoxaparin  40 mg Subcutaneous Daily    azithromycin  500 mg Intravenous Q24H    nicotine  1 patch Transdermal Daily    therapeutic multivitamin-minerals  1 tablet Oral Daily    ondansetron  4 mg Intravenous Once    cefepime  2,000 mg Intravenous Q12H     Continuous Infusions:    sodium chloride      sodium chloride 75 mL/hr at 05/13/21 1215    sodium chloride       PRN Meds: perflutren lipid microspheres, sodium chloride flush, sodium chloride, potassium chloride **OR** potassium alternative oral replacement **OR** potassium chloride, magnesium sulfate, promethazine **OR** ondansetron, polyethylene glycol, acetaminophen **OR** acetaminophen, benzonatate, sodium chloride flush, sodium chloride    Data:     Past Medical History:   has a past medical history of Hydronephrosis and Kidney stone. Social History:   reports that he has been smoking cigarettes. He has been smoking about 0.50 packs per day. He has never used smokeless tobacco. He reports previous alcohol use. He reports previous drug use. Drug: Marijuana. Family History: History reviewed. No pertinent family history. Vitals:  /67   Pulse 99   Temp 99.9 °F (37.7 °C) (Axillary)   Resp 22   Ht 5' 11\" (1.803 m)   Wt 147 lb 7.8 oz (66.9 kg)   SpO2 90%   BMI 20.57 kg/m²   Temp (24hrs), Av.7 °F (38.2 °C), Min:97.9 °F (36.6 °C), Max:103.1 °F (39.5 °C)    No results for input(s): POCGLU in the last 72 hours. I/O(24Hr):     Intake/Output Summary (Last 24 hours) at 2021 1553  Last data filed at 2021 1420  Gross per 24 hour   Intake 500 ml   Output 1025 ml   Net -525 ml       Labs:    CBC with Differential:    Lab Results   Component Value Date    WBC 6.8 2021    RBC 3.04 2021    HGB 9.3 2021    HCT 27.6 2021     2021    MCV 90.7 2021    MCH 30.5 2021    MCHC 33.6 2021    RDW 15.4 2021    LYMPHOPCT 2 2021    MONOPCT 3 2021    BASOPCT 0 2021    MONOSABS 0.16 2021    LYMPHSABS 0.16 2021    EOSABS 0.00 2021    BASOSABS 0.00 2021    DIFFTYPE NOT REPORTED 2021     BMP:    Lab Results   Component Value Date     2021    K 3.5 2021    CL 99 2021    CO2 24 2021    BUN 10 2021    LABALBU 2.1 2021    CREATININE 0.68 2021    CALCIUM 7.4 2021    GFRAA >60 2021    LABGLOM >60 2021    GLUCOSE 92 2021     Sodium:    Lab Results   Component Value Date     05/13/2021       Lab Results   Component Value Date/Time    SPECIAL NOT REPORTED 05/11/2021 11:44 PM     Lab Results   Component Value Date/Time    CULTURE NO GROWTH 1 DAY 05/11/2021 11:44 PM         Radiology:    [facility] Chest Portable    Result Date: 5/11/2021  EXAMINATION: ONE XRAY VIEW OF THE CHEST 5/11/2021 11:27 pm COMPARISON: None. HISTORY: ORDERING SYSTEM PROVIDED HISTORY: Cough, SOB TECHNOLOGIST PROVIDED HISTORY: Cough, SOB Reason for Exam: Cough, SOB Acuity: Acute Type of Exam: Initial Additional signs and symptoms: Cough, SOB Relevant Medical/Surgical History: Cough, SOB FINDINGS: Lungs are hyperaerated. There are increased reticulonodular interstitial markings throughout. Mediastinum normal.  Bony thorax intact. Small airways disease and reticulonodular interstitial disease. Differential considerations include infectious and inflammatory interstitial lung disease. Ct Chest Abdomen Pelvis W Contrast    Result Date: 5/12/2021  EXAMINATION: CT OF THE CHEST, ABDOMEN, AND PELVIS WITH CONTRAST 5/12/2021 1:49 pm TECHNIQUE: CT of the chest, abdomen and pelvis was performed with the administration of intravenous contrast. Multiplanar reformatted images are provided for review. Dose modulation, iterative reconstruction, and/or weight based adjustment of the mA/kV was utilized to reduce the radiation dose to as low as reasonably achievable. COMPARISON: No priors HISTORY: ORDERING SYSTEM PROVIDED HISTORY: cachexia TECHNOLOGIST PROVIDED HISTORY: cachexia rule out lymphoma,legionella pneumonia? Reason for Exam: cachexia, rule out lymphoma,legionella pneumonia? Acuity: Unknown Type of Exam: Unknown FINDINGS: Chest: Mediastinum: The cardiac size is normal. 1.7 cm low right paratracheal lymph node. Smaller high right paratracheal lymph nodes. Calcified subcarinal lymph nodes. .  The thyroid gland shows no significant abnormalities.   The esophagus shows no significant abnormalities. Lungs/pleura: Innumerable round rim and tree-in-bud nodules/micro nodules some of which demonstrate cavitation. There is a dominant 1.8 x 3.1 cm consolidation anterior segment right upper lobe showing some air bronchograms. Findings most supportive of infectious etiology including pneumonias and septic emboli in appropriate clinical setting. Trace right pleural effusion. Minor subsegmental atelectasis at the posterior sulci. No pneumothorax. Soft Tissues/Bones: No acute abnormality of the bones. The superficial soft tissues show no significant abnormalities. Abdomen/Pelvis: Organs: Liver shows no focal lesions. Spleen demonstrates multiple scattered hypodensities with ill-defined margins to about a cm in size. Differential would include microabscesses and lymphoma. Some metastatic disease can have similar appearance. Kidneys, adrenal glands, pancreas and gallbladder unremarkable. GI/Bowel: No acute process. No bowel obstruction. No focal lesions. Pelvis: Urinary bladder grossly normal.  No suspicious pelvic mass. Peritoneum/Retroperitoneum: No free fluid. No retroperitoneal lymphadenopathy. Bones/Soft Tissues: No acute abnormality of the bones. The superficial soft tissues show no significant abnormalities. There are innumerable cavitating and non cavitating mostly subcentimeter pulmonary nodules and tree-in-bud nodules noted diffusely throughout the lungs. Some around them whereas others are centrilobular in distribution. Trace pleural effusion. Differential consideration is pneumonia versus septic emboli. Multiple scattered splenic hypodensities with ill-defined margins. These measure up to a cm in size. Most likely micro abscesses. Differential would include lymphoma and metastatic disease. Physical Examination:        Physical Exam  Vitals signs reviewed. Constitutional:       General: He is awake. He is not in acute distress. Appearance: He is cachectic.  He is ill-appearing. HENT:      Head: Atraumatic. Eyes:      General: Vision grossly intact. Neck:      Musculoskeletal: Neck supple. Cardiovascular:      Rate and Rhythm: Normal rate and regular rhythm. Heart sounds: Normal heart sounds. Pulmonary:      Effort: Pulmonary effort is normal.      Breath sounds: Normal air entry. Decreased breath sounds present. Abdominal:      General: Abdomen is flat. Bowel sounds are normal.      Palpations: Abdomen is soft. Tenderness: There is no abdominal tenderness. Musculoskeletal:      Right lower leg: No edema. Left lower leg: No edema. Skin:     Findings: Rash present. Comments: Rash on the face sparing the skin surrounding eyes, likely from dryness/irritation   Neurological:      Mental Status: He is alert and easily aroused. Psychiatric:         Behavior: Behavior is cooperative. Assessment:        Primary Problem  Pneumonia due to organism    Active Hospital Problems    Diagnosis Date Noted    Pneumonia due to organism [J18.9] 05/12/2021    Tobacco use [Z72.0] 05/12/2021    Weight loss [R63.4] 05/12/2021    Dehydration [E86.0] 05/12/2021    Hyponatremia [E87.1] 05/12/2021    Anemia [D64.9]     Elevated LFTs [R79.89]     Constipation [K59.00]        Plan:        Bilateral Atypical Pneumonia 2/2 Suspected PCP Pneumonia in the Setting of Acute HIV Infection  - CXR on 05/11: Small airway disease and reticulonodular interstitial disease  - CT Chest Abdomen 05/12: Innumerable cavitating and non cavitating mostly sub centimeter pulmonary nodules and tree-in-bud nodules noted diffusely throughout the lungs, some are centrilobular in distribution (differentials include pneumonia or septic emboli). Trace pleural effusion.  Multiple splenic hypo densities with ill defined margins up to 1 cm in size likely micro abscesses (differentials include lymphoma or metastatic disease)  - WBC count 6.8  - Blood cultures NGTD  - Legionella urine negative, COVID negative   - Alpha 1 antitrypsin negative, AFP wnl, Ceruloplasmin wnl, Mitochondrial antibodies negative  - EBV IgG positive  - CMV IgG and IgM positive  - Pro Calcitonin 18.94  - HIV Ag/Ab positive  - CD4 count of 25, HIV RNA viral load PENDING  - Mycoplasma IgM and TB Quantiferon PENDING  - Will get ABG to assess pO2 and need for steroids - 85.3, no indication for steroids   - Will get induced sputum for pneumocystis stain and respiratory culture  - Will need Bronchoscopy for BAL   - ID on board   - Needs bronchoscopy   - Zithromax Day 2   - Diflucan day 2   - Cefepime day 2, possibly discontinue?    - IV Bactrim Day 1   - Echocardiogram pending   - Hepatitis panel negative   - GC and Chlamydia negative   - C diff negative   - Will appreciate recs regarding timing of HAART therapy initiation   - Pulmonology on board, appreciate recs     Possible Malignancy or Septic Emboli on Imaging  - CT Chest Abdomen 05/12: Innumerable cavitating and non cavitating mostly sub centimeter pulmonary nodules and tree-in-bud nodules noted diffusely throughout the lungs, some are centrilobular in distribution (differentials include pneumonia or septic emboli). Trace pleural effusion.  Multiple splenic hypo densities with ill defined margins up to 1 cm in size likely micro abscesses (differentials include lymphoma or metastatic disease)  - Will get peripheral blood smear  - Echocardiogram is pending  - Blood cultures NGTD    Candida Esophagitis   - EGD 05/13: Significant esophagitis involving entire esophagus with white thick cottage cheese type discharge consistent with candida esophagitis  - IV Fluconazole Day 2    Active CMV Infection, Prior EBV Infection  - Positive IgG and IgM  - Positive IgG, negative IgM    Syphilis Infection  - Positive IgG  - VDRL positive  - ID on board appreciate recs    Hyponatremia  - Sodium 131 today      DVT Prophylaxis: HELD for EGD  GI Prophylaxis:  None      Carl Patel MD  5/13/2021  3:53 PM

## 2021-05-14 LAB
ANION GAP SERPL CALCULATED.3IONS-SCNC: 9 MMOL/L (ref 9–17)
BUN BLDV-MCNC: 10 MG/DL (ref 6–20)
BUN/CREAT BLD: ABNORMAL (ref 9–20)
CALCIUM SERPL-MCNC: 7.1 MG/DL (ref 8.6–10.4)
CHLORIDE BLD-SCNC: 98 MMOL/L (ref 98–107)
CO2: 21 MMOL/L (ref 20–31)
CREAT SERPL-MCNC: 0.78 MG/DL (ref 0.7–1.2)
GFR AFRICAN AMERICAN: >60 ML/MIN
GFR NON-AFRICAN AMERICAN: >60 ML/MIN
GFR SERPL CREATININE-BSD FRML MDRD: ABNORMAL ML/MIN/{1.73_M2}
GFR SERPL CREATININE-BSD FRML MDRD: ABNORMAL ML/MIN/{1.73_M2}
GLUCOSE BLD-MCNC: 94 MG/DL (ref 70–99)
HCT VFR BLD CALC: 26.7 % (ref 41–53)
HEMOGLOBIN: 8.9 G/DL (ref 13.5–17.5)
MAGNESIUM: 2 MG/DL (ref 1.6–2.6)
MCH RBC QN AUTO: 30.2 PG (ref 26–34)
MCHC RBC AUTO-ENTMCNC: 33.5 G/DL (ref 31–37)
MCV RBC AUTO: 89.9 FL (ref 80–100)
MYCOPLASMA PNEUMONIAE IGM: 0.3
NRBC AUTOMATED: ABNORMAL PER 100 WBC
PDW BLD-RTO: 15.4 % (ref 11.5–14.9)
PLATELET # BLD: 155 K/UL (ref 150–450)
PMV BLD AUTO: 9.2 FL (ref 6–12)
POTASSIUM SERPL-SCNC: 3.4 MMOL/L (ref 3.7–5.3)
RBC # BLD: 2.97 M/UL (ref 4.5–5.9)
SODIUM BLD-SCNC: 128 MMOL/L (ref 135–144)
SURGICAL PATHOLOGY REPORT: NORMAL
WBC # BLD: 8.5 K/UL (ref 3.5–11)

## 2021-05-14 PROCEDURE — 99233 SBSQ HOSP IP/OBS HIGH 50: CPT | Performed by: INTERNAL MEDICINE

## 2021-05-14 PROCEDURE — APPSS30 APP SPLIT SHARED TIME 16-30 MINUTES: Performed by: NURSE PRACTITIONER

## 2021-05-14 PROCEDURE — 6360000002 HC RX W HCPCS: Performed by: INTERNAL MEDICINE

## 2021-05-14 PROCEDURE — 2580000003 HC RX 258: Performed by: STUDENT IN AN ORGANIZED HEALTH CARE EDUCATION/TRAINING PROGRAM

## 2021-05-14 PROCEDURE — 83735 ASSAY OF MAGNESIUM: CPT

## 2021-05-14 PROCEDURE — 2500000003 HC RX 250 WO HCPCS: Performed by: INTERNAL MEDICINE

## 2021-05-14 PROCEDURE — 99232 SBSQ HOSP IP/OBS MODERATE 35: CPT | Performed by: INTERNAL MEDICINE

## 2021-05-14 PROCEDURE — 2580000003 HC RX 258: Performed by: INTERNAL MEDICINE

## 2021-05-14 PROCEDURE — 6370000000 HC RX 637 (ALT 250 FOR IP): Performed by: INTERNAL MEDICINE

## 2021-05-14 PROCEDURE — 36415 COLL VENOUS BLD VENIPUNCTURE: CPT

## 2021-05-14 PROCEDURE — 80048 BASIC METABOLIC PNL TOTAL CA: CPT

## 2021-05-14 PROCEDURE — 2060000000 HC ICU INTERMEDIATE R&B

## 2021-05-14 PROCEDURE — 6360000002 HC RX W HCPCS: Performed by: STUDENT IN AN ORGANIZED HEALTH CARE EDUCATION/TRAINING PROGRAM

## 2021-05-14 PROCEDURE — 85027 COMPLETE CBC AUTOMATED: CPT

## 2021-05-14 RX ORDER — VALGANCICLOVIR 450 MG/1
900 TABLET, FILM COATED ORAL 2 TIMES DAILY
Status: DISCONTINUED | OUTPATIENT
Start: 2021-05-15 | End: 2021-05-18 | Stop reason: HOSPADM

## 2021-05-14 RX ADMIN — SODIUM CHLORIDE, PRESERVATIVE FREE 10 ML: 5 INJECTION INTRAVENOUS at 09:12

## 2021-05-14 RX ADMIN — SULFAMETHOXAZOLE AND TRIMETHOPRIM 334.4 MG: 80; 16 INJECTION, SOLUTION, CONCENTRATE INTRAVENOUS at 16:59

## 2021-05-14 RX ADMIN — SULFAMETHOXAZOLE AND TRIMETHOPRIM 334.4 MG: 80; 16 INJECTION, SOLUTION, CONCENTRATE INTRAVENOUS at 02:35

## 2021-05-14 RX ADMIN — SULFAMETHOXAZOLE AND TRIMETHOPRIM 334.4 MG: 80; 16 INJECTION, SOLUTION, CONCENTRATE INTRAVENOUS at 09:11

## 2021-05-14 RX ADMIN — AZITHROMYCIN DIHYDRATE 500 MG: 500 INJECTION, POWDER, LYOPHILIZED, FOR SOLUTION INTRAVENOUS at 22:15

## 2021-05-14 RX ADMIN — ACETAMINOPHEN 650 MG: 325 TABLET, FILM COATED ORAL at 02:35

## 2021-05-14 RX ADMIN — CEFEPIME 2000 MG: 2 INJECTION, POWDER, FOR SOLUTION INTRAVENOUS at 14:09

## 2021-05-14 RX ADMIN — AZITHROMYCIN DIHYDRATE 500 MG: 500 INJECTION, POWDER, LYOPHILIZED, FOR SOLUTION INTRAVENOUS at 00:31

## 2021-05-14 RX ADMIN — FLUCONAZOLE 200 MG: 200 INJECTION, SOLUTION INTRAVENOUS at 15:15

## 2021-05-14 RX ADMIN — POTASSIUM CHLORIDE 40 MEQ: 1500 TABLET, EXTENDED RELEASE ORAL at 09:15

## 2021-05-14 RX ADMIN — SODIUM CHLORIDE 365 MG: 9 INJECTION, SOLUTION INTRAVENOUS at 22:15

## 2021-05-14 RX ADMIN — MULTIPLE VITAMINS W/ MINERALS TAB 1 TABLET: TAB at 09:12

## 2021-05-14 RX ADMIN — CEFEPIME 2000 MG: 2 INJECTION, POWDER, FOR SOLUTION INTRAVENOUS at 03:42

## 2021-05-14 ASSESSMENT — ENCOUNTER SYMPTOMS
NAUSEA: 0
DIARRHEA: 1
COUGH: 1
ABDOMINAL PAIN: 0
VOMITING: 0

## 2021-05-14 ASSESSMENT — PAIN SCALES - GENERAL
PAINLEVEL_OUTOF10: 0
PAINLEVEL_OUTOF10: 0

## 2021-05-14 ASSESSMENT — VISUAL ACUITY: OU: 1

## 2021-05-14 NOTE — PROGRESS NOTES
Pulmonary Progress Note  [facility]      Patient - Lissette Alanis,  Age - 45 y.o.    - 1980      Room Number - 2098/2098-01   N -  [id number]   [facility]  Date of Admission -  2021 10:51 PM        Consulting Delicia Wiley MD  Primary Care Physician - No primary care provider on file. SUBJECTIVE   Lookscomfortable, on room air  Coughs clear sputum, no hemoptysis, no short of breath or wheezing  T-max 102.9, 97.3 now    OBJECTIVE   VITALS    height is 5' 11\" (1.803 m) and weight is 160 lb 15 oz (73 kg). His oral temperature is 97.3 °F (36.3 °C). His blood pressure is 104/67 and his pulse is 78. His respiration is 20 and oxygen saturation is 94%. Body mass index is 22.45 kg/m². Temperature Range: Temp: 97.3 °F (36.3 °C) Temp  Av.8 °F (37.7 °C)  Min: 97.3 °F (36.3 °C)  Max: 102.9 °F (39.4 °C)  BP Range:  Systolic (09ZYJ), LJC:855 , Min:104 , CVX:767     Diastolic (55YQN), FOC:37, Min:67, Max:85    Pulse Range: Pulse  Av.8  Min: 10  Max: 99  Respiration Range: Resp  Av.5  Min: 18  Max: 22  Current Pulse Ox[de-identified]  SpO2: 94 %  24HR Pulse Ox Range:  SpO2  Av.5 %  Min: 90 %  Max: 97 %  Oxygen Amount and Delivery: O2 Flow Rate (L/min): 3 L/min    Wt Readings from Last 3 Encounters:   21 160 lb 15 oz (73 kg)       I/O (24 Hours)    Intake/Output Summary (Last 24 hours) at 2021 1443  Last data filed at 2021 1233  Gross per 24 hour   Intake 120 ml   Output 1450 ml   Net -1330 ml       EXAM     General Appearance  Awake, alert, oriented, in no acute distress  HEENT - normocephalic, atraumatic.    Neck -no JVD,  trachea midline   Lungs -coarse, no wheezing or distress  Cardiovascular - Heart sounds are normal.  Regular rate and rhythm   Abdomen - Soft, nontender, nondistended, no guarding  Neurologic -appropriate, following commands, looks comfortable  Extremities - No clubbing, cyanosis, edema    MEDS      fluconazole  200 mg Intravenous Q24H    sulfamethoxazole-trimethoprim (BACTRIM) IVPB  5 mg/kg Intravenous Q8H    sodium chloride flush  5-40 mL Intravenous 2 times per day    [Held by provider] enoxaparin  40 mg Subcutaneous Daily    azithromycin  500 mg Intravenous Q24H    nicotine  1 patch Transdermal Daily    therapeutic multivitamin-minerals  1 tablet Oral Daily    ondansetron  4 mg Intravenous Once    cefepime  2,000 mg Intravenous Q12H      sodium chloride      sodium chloride 75 mL/hr at 05/13/21 1215    sodium chloride       perflutren lipid microspheres, sodium chloride flush, sodium chloride, potassium chloride **OR** potassium alternative oral replacement **OR** potassium chloride, magnesium sulfate, promethazine **OR** ondansetron, polyethylene glycol, acetaminophen **OR** acetaminophen, benzonatate, sodium chloride flush, sodium chloride    LABS   CBC   Recent Labs     05/14/21  0530   WBC 8.5   HGB 8.9*   HCT 26.7*   MCV 89.9        BMP:   Lab Results   Component Value Date     05/14/2021    K 3.4 05/14/2021    CL 98 05/14/2021    CO2 21 05/14/2021    BUN 10 05/14/2021    LABALBU 2.1 05/13/2021    CREATININE 0.78 05/14/2021    CALCIUM 7.1 05/14/2021    GFRAA >60 05/14/2021    LABGLOM >60 05/14/2021     ABGs:  Lab Results   Component Value Date    PHART 7.493 05/13/2021    PO2ART 85.3 05/13/2021    YHY6JDK 29.5 05/13/2021      Lab Results   Component Value Date    MODE NOT REPORTED 05/13/2021     Ionized Calcium:  No results found for: IONCA  Magnesium:    Lab Results   Component Value Date    MG 2.0 05/14/2021      Phosphorus:  No results found for: PHOS     LIVER PROFILE   Recent Labs     05/11/21  2344 05/13/21  0533   AST 94* 71*   ALT 64* 40   LIPASE 84*  --    BILIDIR  --  0.62*   BILITOT 1.05 0.85   ALKPHOS 121 88     INR   Recent Labs     05/11/21  2344   INR 1.1     PTT   Recent Labs     05/11/21  2344   APTT 41.4*     BNP No results for input(s): BNP in the last 72 hours. RADIOLOGY     (See actual reports for details)    ASSESSMENT/PLAN   Principal Problem:    Pneumonia due to organism  Active Problems:    Tobacco use    Symptomatic HIV infection ([facility] [address]    Candida esophagitis (HCC)    Acute cytomegalovirus infection ([facility] [address]    1. Exertional dyspnea for four months with productive cough of clear  sputum. 2.  innuumerable bilateral lung nodules, cavitary and noncavitary. Suspicious for septic emboli , especially with the splenic hypodensities but negative blood cultures so far,  rule out TB/less likely, fungal or other opportunistic infection, with elevated LDH mostly suspicious would be for PCP pneumonia. Procalcitonin at18.9.  3.  Paratracheal lymphadenopathy, mostly reactive. 4.  Tobacco abuse 0.5 pack a day for 20 years. Denies any underlying  lung disease like asthma or COPD. 5.  Right upper lobe infiltrate, possibly infectious. 6.  Weight loss 20 to 30 pounds with poor appetite,  HIV Infection  as per ID and also his EGD showed a Candida esophagitis. negative colonoscopy,      PLAN OF TREATMENT:      Suspicious for PCP pneumonia, already on Bactrim, possible CMV infection  Hopefully bronchoscopy on Monday, we were awaiting for blood cultures to rule out septic emboli, which so far are negative  Antibiotics as per ID.    nicotine patch    on Diflucan   Lovenox for DVT prophylaxis. discussed with ID, discussed with the supervisors on floor and ICU, they were unable to accommodate for portable bronchoscopy because of staffing shortage.     Electronically signed by Evie Coles MD on 5/14/2021 at 2:43 PM

## 2021-05-14 NOTE — PROGRESS NOTES
[facility]    Gastroenterology Daily Progress Note      Patient:   Todd Dodd   :    1980   Facility:   [facility]   Date:     2021  Consultant:   Atul Cardenas CNP      SUBJECTIVE  45 y.o. male admitted 2021 with Pneumonia [J18.9] and seen for thrush and abdominal pain. The pt was seen and examined. He is s/p egd and colonoscopy yesterday, results are discussed below. No c/o nausea or abdominal pain today. On diflucan for possible candida. ID workup ongoing.          OBJECTIVE  Scheduled Meds:   fluconazole  200 mg Intravenous Q24H    sulfamethoxazole-trimethoprim (BACTRIM) IVPB  5 mg/kg Intravenous Q8H    sodium chloride flush  5-40 mL Intravenous 2 times per day    [Held by provider] enoxaparin  40 mg Subcutaneous Daily    azithromycin  500 mg Intravenous Q24H    nicotine  1 patch Transdermal Daily    therapeutic multivitamin-minerals  1 tablet Oral Daily    ondansetron  4 mg Intravenous Once    cefepime  2,000 mg Intravenous Q12H       Vital Signs:  /67   Pulse 78   Temp 97.3 °F (36.3 °C) (Oral)   Resp 20   Ht 5' 11\" (1.803 m)   Wt 160 lb 15 oz (73 kg)   SpO2 94%   BMI 22.45 kg/m²      Physical Exam:     General Appearance: alert and oriented to person, place and time, well-developed and mall-nourished, in no acute distress  Skin: warm and dry, no rash or erythema  Head: normocephalic and atraumatic  Eyes: pupils equal, round, and reactive to light, extraocular eye movements intact, conjunctivae normal  ENT: hearing grossly normal bilaterally  Neck: neck supple and non tender without mass, no thyromegaly or thyroid nodules, no cervical lymphadenopathy   Pulmonary/Chest: clear to auscultation bilaterally- no wheezes, rales or rhonchi, normal air movement, no respiratory distress  Cardiovascular: normal rate, regular rhythm, normal S1 and S2, no murmurs, rubs, clicks or gallops, distal pulses intact, no carotid bruits  Abdomen: soft, of which demonstrate cavitation. Cassie Doug is a dominant 1.8 x 3.1 cm   consolidation anterior segment right upper lobe showing some air   bronchograms.  Findings most supportive of infectious etiology including   pneumonias and septic emboli in appropriate clinical setting.       Trace right pleural effusion.  Minor subsegmental atelectasis at the   posterior sulci.       No pneumothorax.       Soft Tissues/Bones: No acute abnormality of the bones.  The superficial soft   tissues show no significant abnormalities.           Abdomen/Pelvis:       Organs: Liver shows no focal lesions.  Spleen demonstrates multiple scattered   hypodensities with ill-defined margins to about a cm in size.  Differential   would include microabscesses and lymphoma.  Some metastatic disease can have   similar appearance.       Kidneys, adrenal glands, pancreas and gallbladder unremarkable.       GI/Bowel: No acute process.  No bowel obstruction.  No focal lesions.       Pelvis: Urinary bladder grossly normal.  No suspicious pelvic mass.       Peritoneum/Retroperitoneum: No free fluid.  No retroperitoneal   lymphadenopathy.       Bones/Soft Tissues: No acute abnormality of the bones.  The superficial soft   tissues show no significant abnormalities.           Impression   There are innumerable cavitating and non cavitating mostly subcentimeter   pulmonary nodules and tree-in-bud nodules noted diffusely throughout the   lungs.  Some around them whereas others are centrilobular in distribution.    Trace pleural effusion.  Differential consideration is pneumonia versus   septic emboli.       Multiple scattered splenic hypodensities with ill-defined margins.  These   measure up to a cm in size.  Most likely micro abscesses.  Differential would   include lymphoma and metastatic disease.               colonoscopy dr Danielle Leal 5/13/21  The prep was poor.       Findings:  Terminal ileum: normal in the last 2 cm  Cecum/Ascending colon: normal     Transverse colon: normal     Descending/Sigmoid colon: normal     Rectum/Anus: examined in normal and retroflexed positions and was abnormal: Low anal tone, minor hemorrhoids    Findings:egd dr Mellissa Talbot 5/13/21     Retropharyngeal area was grossly normal appearing     Esophagus: abnormal: Significant esophagitis involving the entire esophagus with white thick cottage cheese type discharge most consistent with Candida biopsies were taken     Stomach:    Fundus: normal    Body: normal    Antrum: normal     Duodenum:     Descending: normal    Bulb: normal    ASSESSMENT/plan  Abdominal pain with thrush s/p egd and colonoscopy yesterday showing possible candida and significant esophagitis colonoscopy had poor prep and showed minor hemorrhoids  -continue diflucan, f/u biopsy results  -diet as tolerated from gi standpoint      2.pneumonia with cavitary and non cavitary lung nodules ? ? TB, CMV positive HIV reactive, t palladium antibody positive  -antibiotics and management per ID and pulmonary service    D/w md GI signing off        This plan was formulated in collaboration with Dr. Mellissa Talbot. Electronically signed by: NACHO Ace - CNP on 5/14/2021 at 11:03 AM   Attending Physician Statement  I have discussed the care of [facility] and   I have examined the patient myselft independently, and taken ros and hpi , including pertinent history and exam findings,  with the author of this note . I have reviewed the key elements of all parts of the encounter with the nurse practitioner/resident.     I agree with the assessment, plan and orders as documented by the above health care provider     Management as per primary and ID team  Continue Diflucan  Return to clinic as an outpatient and will follow him from there  Currently the treatment of the HIV impossible to be taken precedence    Electronically signed by Kamilah Tanner MD

## 2021-05-14 NOTE — CONSULTS
[address] [facility]                 [address] [facility], [address]                                  CONSULTATION    PATIENT NAME: Katherine Carlin                  :        1980  MED REC NO:   [id number]                              ROOM:       2098  ACCOUNT NO:   [de-identified]                           ADMIT DATE: 2021  PROVIDER:     Esther Cantor    CONSULT DATE:  2021    REFERRING PROVIDER:  Dr. Krystyna Ramirez:  Dyspnea and lung nodule. HISTORY OF PRESENT ILLNESS:  This is a 76-year-old gentleman with past  medical history of hydronephrosis and kidney stones. The patient  apparently is being complaining since last 2021 for the last four  months or so of exertional dyspnea, better with rest with having  recurrent cough with clear sputum. No hemoptysis and no wheezing. REVIEW OF SYSTEMS:  GENERAL:  Noted recurrent chills. NEURO:  Had headaches and fatigue. EYES:  No redness or watery eyes. ENT:  Had some nasal congestion. No drip or sneezing. CARDIAC:  No chest pain or palpitation. RESPIRATORY:  As noted above with exertional short of breath and clear  sputum productive cough. GI:  Had some recurrent nausea, had poor appetite. He lost 20 to 30  pounds during the last few months. No GI bleed. GENITOURINARY:  No dysuria. Noted some blood in the urine at times. MUSCULOSKELETAL:  Some back pain and foot pain. 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 SKIN:  No new rash or ulceration. PSYCH:  No anxiety or depressed mood. PAST MEDICAL HISTORY:  As noted, significant for kidney stone and  hydronephrosis. PAST SURGICAL HISTORY:  Appendectomy and today he had EGD and  colonoscopy. FAMILY HISTORY:  None reported. SOCIAL HISTORY:  He has been a smoker for 20 years of half a pack a day  and he denies any alcohol abuse.   He said he smoked marijuana in the  past.  The patient is a homosexual, does have a male partner. ALLERGIES:  BEE VENOM. CURRENT MEDICATIONS:  Noted. He is on cefepime, Zithromax and he is on  Lovenox, Diflucan, nicotine, Bactrim, and vancomycin. He is also on  normal saline at 75 mL an hour. PHYSICAL EXAMINATION:  VITAL SIGNS:  His T-max 103.1, now it is 99.9, pulse is 99, respiratory  rate 22, blood pressure is 130/67, and saturation 90% to 97% on room  air. HEENT:  No icterus noted. NECK:  No JVD or lymphadenopathy noted. HEART:  S1, S2 regular. No gallop. LUNGS:  Coarse. No wheezing, tachypnea or distress. ABDOMEN:  Soft. No guarding. Bowel sounds present. EXTREMITIES:  No edema, calf tenderness or stiffness. SKIN:  No new rash or ulceration noted. NEUROLOGIC:  He is awake, alert, appropriate, following commands. IMAGING STUDIES:  His chest x-ray showing he had bilateral nodular  interstitial infiltrates with right upper lobe infiltrate. CT chest  showed numeral cavitary and noncavitary bilateral lung nodules. Had a  1.7-cm right paratracheal lymphadenopathy and a 3.1-cm right upper lobe  consolidation. CT of the abdomen showed multiple scattered splenic  hypodensities that may reflect some microabscesses and there is no free  fluid in the abdomen. LABORATORY WORK:  His potassium 3.5, sodium is 131, BUN 10, creatinine  0.6, and blood sugar is 92. His procalcitonin was 18.9. Lactic acid  1.0. His albumin is 2.1 and AST is 71, ALT 40 and his cortisol level  was 24.7. TSH 1.4. WBC 6.8, hemoglobin 9.3, and platelets is 012. His  ferritin level was 2657. COVID-19 infection was negative. The patient  at least had four negative tests since 01/2021. ASSESSMENT:  1. Exertional dyspnea for four months with productive cough of clear  sputum. 2.  Numeral bilateral lung nodules, cavitary and noncavitary. Questionable septic emboli, especially with the splenic hypodensities,  rule out TB, fungal or other opportunistic infection.   Procalcitonin at  18.9.  3.

## 2021-05-14 NOTE — PROGRESS NOTES
[address] [facility]    PROGRESS NOTE             5/14/2021    9:32 AM    Name:   Philippe Castro  MRN:     [id number]     Acct:      [de-identified]   Room:   2098/2098-01   Day:  2  Admit Date:  5/11/2021 10:51 PM    PCP:  No primary care provider on file. Code Status:  Full Code    Subjective:     C/C:   Chief Complaint   Patient presents with    Fever     Pt states fever, dizziness, dehydration, no appetite, constipation, weight loss, and several other complaints. Interval History Status: improved. Patient seen and examined at bedside. Continues to spike fevers with last reading of 102.9 at 1AM last night. Otherwise vital signs are within normal limits. Breathing room air. Patient states he is slowly feeling better. Potassium 3.4 this morning being replaced. On day 3 of Zithromax, Cefepime, Diflucan and day 2 of IV Bactrim. Will await pulmonology recs regarding timing of bronchoscopy. Echo negative. Brief History:     45year old male with no significant past medical history presented due to fever, night sweats, dry cough and shortness of breath that all began in January of 2021. Patient is homosexual, has sex with men. States he went to an urgent care clinic and was given a z pack in late January. Symptoms improved a little bit, but came back shortly after. States over the past month his cough has been worsening and he has been getting fevers and night sweats every day. Has had a 20-30 lb weight loss in the past 5 months. Complaining of decreased appetite as well. States he has been tested for HIV in the past and has always been negative. Patient also has Oral Thrush.      CXR done in the ER showed small airway disease and reticulonodular interstitial disease.  CT Chest Abdomen Pelvis done on 05/12 showing innumerable cavitating and non cavitating mostly sub centimeter pulmonary nodules and tree-in-bud nodules noted diffusely throughout the lungs, some are centrilobular in distribution. Trace pleural effusion. Multiple splenic hypo densities with ill defined margins up to 1 cm in size likely micro abscesses. ID and GI consulted. Patient started on Vancomycin, Cefepime and Azithromycin. HIV screen positive. EGD done on 05/13 showing candida esophagitis, started on IV Diflucan. Vancomycin discontinued due to negative MRSA swab. Patient started on IV Bactrim on 05/13. Review of Systems:     Review of Systems   Constitutional: Positive for diaphoresis and fever. HENT: Negative for congestion. Eyes: Negative for visual disturbance. Respiratory: Positive for cough. Cardiovascular: Negative for chest pain. Gastrointestinal: Positive for diarrhea. Negative for abdominal pain, nausea and vomiting. Endocrine: Negative for polyuria. Genitourinary: Negative for dysuria. Skin: Negative for wound. Neurological: Negative for weakness and numbness. Medications: Allergies:     Allergies   Allergen Reactions    Bee Venom        Current Meds:   Scheduled Meds:    fluconazole  200 mg Intravenous Q24H    sulfamethoxazole-trimethoprim (BACTRIM) IVPB  5 mg/kg Intravenous Q8H    sodium chloride flush  5-40 mL Intravenous 2 times per day    [Held by provider] enoxaparin  40 mg Subcutaneous Daily    azithromycin  500 mg Intravenous Q24H    nicotine  1 patch Transdermal Daily    therapeutic multivitamin-minerals  1 tablet Oral Daily    ondansetron  4 mg Intravenous Once    cefepime  2,000 mg Intravenous Q12H     Continuous Infusions:    sodium chloride      sodium chloride 75 mL/hr at 05/13/21 1215    sodium chloride       PRN Meds: perflutren lipid microspheres, sodium chloride flush, sodium chloride, potassium chloride **OR** potassium alternative oral replacement **OR** potassium chloride, magnesium sulfate, promethazine **OR** ondansetron, polyethylene glycol, acetaminophen **OR** acetaminophen, benzonatate, sodium chloride flush, sodium chloride    Data:     Past Medical History:   has a past medical history of Hydronephrosis and Kidney stone. Social History:   reports that he has been smoking cigarettes. He has been smoking about 0.50 packs per day. He has never used smokeless tobacco. He reports previous alcohol use. He reports previous drug use. Drug: Marijuana. Family History: History reviewed. No pertinent family history. Vitals:  /67   Pulse 78   Temp 97.3 °F (36.3 °C) (Oral)   Resp 20   Ht 5' 11\" (1.803 m)   Wt 160 lb 15 oz (73 kg)   SpO2 94%   BMI 22.45 kg/m²   Temp (24hrs), Av.4 °F (38 °C), Min:97.3 °F (36.3 °C), Max:102.9 °F (39.4 °C)    No results for input(s): POCGLU in the last 72 hours. I/O(24Hr):     Intake/Output Summary (Last 24 hours) at 2021 0932  Last data filed at 2021 0658  Gross per 24 hour   Intake 500 ml   Output 950 ml   Net -450 ml       Labs:    CBC with Differential:    Lab Results   Component Value Date    WBC 8.5 2021    RBC 2.97 2021    HGB 8.9 2021    HCT 26.7 2021     2021    MCV 89.9 2021    MCH 30.2 2021    MCHC 33.5 2021    RDW 15.4 2021    LYMPHOPCT 2 2021    MONOPCT 3 2021    BASOPCT 0 2021    MONOSABS 0.16 2021    LYMPHSABS 0.16 2021    EOSABS 0.00 2021    BASOSABS 0.00 2021    DIFFTYPE NOT REPORTED 2021     BMP:    Lab Results   Component Value Date     2021    K 3.4 2021    CL 98 2021    CO2 21 2021    BUN 10 2021    LABALBU 2.1 2021    CREATININE 0.78 2021    CALCIUM 7.1 2021    GFRAA >60 2021    LABGLOM >60 2021    GLUCOSE 94 2021       Lab Results   Component Value Date/Time    SPECIAL NOT REPORTED 2021 11:44 PM     Lab Results   Component Value Date/Time    CULTURE NO GROWTH 2 DAYS 2021 11:44 PM         Radiology:    Echo Complete 2d W Doppler W Color    Result Date: 5/13/2021  [facility] Transthoracic Echocardiography Report (TTE)  Patient Name Maryanne Robin    Date of Study                 05/13/2021               [facility]   Date of      1980  Gender                        Male  Birth   Age          45 year(s)  Race                             Room Number  2098        Height:                       70.87 inch, 180 cm   Corporate ID [id number]    Weight:                       146 pounds, 66 kg  #   Patient Yannick [de-identified]   BSA:           1.84 m^2       BMI:      20.37  #                                                                kg/m^2   MR #         [id number]      Sonographer                   Merari Neff   Accession #  [id number]  Interpreting Physician        Marion Solo   Fellow                   Referring Nurse Practitioner  Mark Page   Interpreting             Referring Physician  Fellow  Type of Study   TTE procedure:2D Echocardiogram, M-Mode, Doppler, Color Doppler. Procedure Date Date: 05/13/2021 Start: 10:51 AM Study Location: [facility] Technical Quality: Fair visualization Patient Status: Inpatient Height: 70.87 inches Weight: 145.53 pounds BSA: 1.84 m^2 BMI: 20.37 kg/m^2 Rhythm: Within normal limits HR: 86 bpm BP: 93/56 mmHg CONCLUSIONS Summary Small LV cavity. Estimated LV EF 50-55 %. Mild left ventricular hypertrophy. Normal right ventricular size and function. Thickened mitral valve leaflets. Mild mitral regurgitation. Mild tricuspid regurgitation. Normal right ventricular systolic pressure. Trivial pericardial effusion.  Signature ----------------------------------------------------------------------------  Electronically signed by Merari Neff(Sonographer) on 05/13/2021 03:03  PM ---------------------------------------------------------------------------- ----------------------------------------------------------------------------  Electronically signed by Marion Solo(Interpreting physician) on  2021 05:23 PM ---------------------------------------------------------------------------- FINDINGS Left Atrium Left atrium is normal in size. Left Ventricle Small LV cavity. Estimated LV EF 50-55 %. Mild left ventricular hypertrophy. Right Atrium Right atrium is normal in size. Right Ventricle Normal right ventricular size and function. Mitral Valve Thickened mitral valve leaflets. Mild mitral regurgitation. Aortic Valve Normal aortic valve structure and function without stenosis or regurgitation. Tricuspid Valve Normal tricuspid valve structure and function. Mild tricuspid regurgitation. Normal right ventricular systolic pressure. Pulmonic Valve The pulmonic valve is normal in structure. Trace pulmonic insufficiency. Pericardial Effusion Trivial pericardial effusion. Miscellaneous Normal aortic root dimension. IVC normal diameter & inspiratory collapse indicating normal RA filling pressure .  E/e' average 5.6 M-mode / 2D Measurements & Calculations:   LVIDd:3.92 cm(3.7 - 5.6 cm)      Diastolic QRPDTN:55.2 ml  RIOAQ:0.81 cm(2.2 - 4.0 cm)      Systolic XANFKR:24.7 ml  MVWV:4.75 cm(0.6 - 1.1 cm)       Aortic Root:3.2 cm(2.0 - 3.7 cm)  LVPWd:1.18 cm(0.6 - 1.1 cm)      LA Dimension: 3.6 cm(1.9 - 4.0 cm)  Fractional Shortenin.4 %     LA volume/Index: 28 ml /15m^2  Calculated LVEF (%): 69.1 %      LVOT:2.1 cm   Mitral:                                Aortic   Peak E-Wave: 0.78 m/s                  Peak Velocity: 1.24 m/s  Peak A-Wave: 0.52 m/s                  Mean Velocity: 0.76 m/s  E/A Ratio: 1.48                        Peak Gradient: 6.15 mmHg  Peak Gradient: 2.4 mmHg                Mean Gradient: 3 mmHg  Deceleration Time: 158 msec                                          Area (continuity): 2.57 cm^2                                         AV VTI: 20.2 cm   Tricuspid:   Peak TR Velocity: 1.79 m/s  Peak TR Gradient: 12.8164 mmHg  Septal Wall E' velocity:0.12 m/s Lateral Wall E' velocity:0.17 m/s    Xr Chest Portable    Result Date: 5/11/2021  EXAMINATION: ONE XRAY VIEW OF THE CHEST 5/11/2021 11:27 pm COMPARISON: None. HISTORY: ORDERING SYSTEM PROVIDED HISTORY: Cough, SOB TECHNOLOGIST PROVIDED HISTORY: Cough, SOB Reason for Exam: Cough, SOB Acuity: Acute Type of Exam: Initial Additional signs and symptoms: Cough, SOB Relevant Medical/Surgical History: Cough, SOB FINDINGS: Lungs are hyperaerated. There are increased reticulonodular interstitial markings throughout. Mediastinum normal.  Bony thorax intact. Small airways disease and reticulonodular interstitial disease. Differential considerations include infectious and inflammatory interstitial lung disease. Ct Chest Abdomen Pelvis W Contrast    Result Date: 5/12/2021  EXAMINATION: CT OF THE CHEST, ABDOMEN, AND PELVIS WITH CONTRAST 5/12/2021 1:49 pm TECHNIQUE: CT of the chest, abdomen and pelvis was performed with the administration of intravenous contrast. Multiplanar reformatted images are provided for review. Dose modulation, iterative reconstruction, and/or weight based adjustment of the mA/kV was utilized to reduce the radiation dose to as low as reasonably achievable. COMPARISON: No priors HISTORY: ORDERING SYSTEM PROVIDED HISTORY: cachexia TECHNOLOGIST PROVIDED HISTORY: cachexia rule out lymphoma,legionella pneumonia? Reason for Exam: cachexia, rule out lymphoma,legionella pneumonia? Acuity: Unknown Type of Exam: Unknown FINDINGS: Chest: Mediastinum: The cardiac size is normal. 1.7 cm low right paratracheal lymph node. Smaller high right paratracheal lymph nodes. Calcified subcarinal lymph nodes. .  The thyroid gland shows no significant abnormalities. The esophagus shows no significant abnormalities. Lungs/pleura: Innumerable round rim and tree-in-bud nodules/micro nodules some of which demonstrate cavitation. There is a dominant 1.8 x 3.1 cm consolidation anterior segment right upper lobe showing some air bronchograms.   Findings most supportive of infectious etiology including pneumonias and septic emboli in appropriate clinical setting. Trace right pleural effusion. Minor subsegmental atelectasis at the posterior sulci. No pneumothorax. Soft Tissues/Bones: No acute abnormality of the bones. The superficial soft tissues show no significant abnormalities. Abdomen/Pelvis: Organs: Liver shows no focal lesions. Spleen demonstrates multiple scattered hypodensities with ill-defined margins to about a cm in size. Differential would include microabscesses and lymphoma. Some metastatic disease can have similar appearance. Kidneys, adrenal glands, pancreas and gallbladder unremarkable. GI/Bowel: No acute process. No bowel obstruction. No focal lesions. Pelvis: Urinary bladder grossly normal.  No suspicious pelvic mass. Peritoneum/Retroperitoneum: No free fluid. No retroperitoneal lymphadenopathy. Bones/Soft Tissues: No acute abnormality of the bones. The superficial soft tissues show no significant abnormalities. There are innumerable cavitating and non cavitating mostly subcentimeter pulmonary nodules and tree-in-bud nodules noted diffusely throughout the lungs. Some around them whereas others are centrilobular in distribution. Trace pleural effusion. Differential consideration is pneumonia versus septic emboli. Multiple scattered splenic hypodensities with ill-defined margins. These measure up to a cm in size. Most likely micro abscesses. Differential would include lymphoma and metastatic disease. Physical Examination:        Physical Exam  Vitals signs reviewed. Constitutional:       General: He is awake. He is not in acute distress. Appearance: He is cachectic. HENT:      Head: Normocephalic. Eyes:      General: Vision grossly intact. Neck:      Musculoskeletal: Neck supple. Cardiovascular:      Rate and Rhythm: Normal rate and regular rhythm. Heart sounds: Normal heart sounds.    Pulmonary:      Effort: Pulmonary effort is normal.      Breath sounds: Wheezing present. Abdominal:      General: Abdomen is flat. Palpations: Abdomen is soft. Tenderness: There is no abdominal tenderness. Musculoskeletal:      Right lower leg: No edema. Left lower leg: No edema. Skin:     Findings: Rash (dry rash on face) present. Neurological:      Mental Status: He is lethargic. Psychiatric:         Behavior: Behavior is cooperative. Assessment:        Primary Problem  Pneumonia due to organism    Active Hospital Problems    Diagnosis Date Noted    Symptomatic HIV infection ([facility] [address] [B20] 05/13/2021    Candida esophagitis ([facility] [address] [B37.81] 05/13/2021    Acute cytomegalovirus infection ([facility] [address] [B25.9] 05/13/2021    Pneumonia due to organism [J18.9] 05/12/2021    Tobacco use [Z72.0] 05/12/2021    Weight loss [R63.4] 05/12/2021    Dehydration [E86.0] 05/12/2021    Hyponatremia [E87.1] 05/12/2021    Anemia [D64.9]     Elevated LFTs [R79.89]     Constipation [K59.00]        Plan:        Bilateral Atypical Pneumonia 2/2 Suspected PCP Pneumonia in the Setting of Acute HIV Infection  - CXR on 05/11: Small airway disease and reticulonodular interstitial disease  - CT Chest Abdomen 05/12: Innumerable cavitating and non cavitating mostly sub centimeter pulmonary nodules and tree-in-bud nodules noted diffusely throughout the lungs, some are centrilobular in distribution (differentials include pneumonia or septic emboli). Trace pleural effusion.  Multiple splenic hypo densities with ill defined margins up to 1 cm in size likely micro abscesses (differentials include lymphoma or metastatic disease)  - WBC count 8.5  - Blood cultures NGTD  - Legionella urine negative, COVID negative   - Alpha 1 antitrypsin negative, AFP wnl, Ceruloplasmin wnl, Mitochondrial antibodies negative  - EBV IgG positive  - CMV IgG and IgM positive  - Pro Calcitonin 18.94  - HIV Ag/Ab positive  - CD4 count of 25, HIV RNA viral load PENDING  - Mycoplasma IgM and TB Quantiferon PENDING  - Will get ABG to assess pO2 and need for steroids - 85.3, no indication for steroids   - Will get induced sputum for pneumocystis stain and respiratory culture  - Will need Bronchoscopy for BAL   - ID on board              - Needs bronchoscopy              - Zithromax Day 3              - Diflucan day 3              - Cefepime day 3              - IV Bactrim Day 2              - Echocardiogram pending              - Hepatitis panel negative              - GC and Chlamydia negative              - C diff negative              - Will appreciate recs regarding timing of HAART therapy initiation   - Pulmonology on board, appreciate recs      Possible Malignancy or Septic Emboli on Imaging  - CT Chest Abdomen 05/12: Innumerable cavitating and non cavitating mostly sub centimeter pulmonary nodules and tree-in-bud nodules noted diffusely throughout the lungs, some are centrilobular in distribution (differentials include pneumonia or septic emboli). Trace pleural effusion. Multiple splenic hypo densities with ill defined margins up to 1 cm in size likely micro abscesses (differentials include lymphoma or metastatic disease)  - Will get peripheral blood smear  - Echocardiogram done 05/13 with LVEF 50-55%.  No vegetations  - Blood cultures NGTD     Candida Esophagitis   - EGD 05/13: Significant esophagitis involving entire esophagus with white thick cottage cheese type discharge consistent with candida esophagitis  - IV Fluconazole Day 3     Active CMV Infection, Prior EBV Infection  - Positive IgG and IgM  - Positive IgG, negative IgM     Syphilis Infection  - Positive IgG  - VDRL positive  - ID on board appreciate recs     Hyponatremia  - Sodium 128 today from 131        DVT Prophylaxis: HELD   GI Prophylaxis:  None    Imelda John MD  5/14/2021  9:32 AM       I have discussed the care of Darling Santana , including pertinent history and exam findings,    today with the resident. I have seen and examined the patient and the key elements of all parts of the encounter have been performed by me . I agree with the assessment, plan and orders as documented by the resident. Principal Problem:    Pneumonia due to organism  Active Problems:    Tobacco use    Weight loss    Dehydration    Hyponatremia    Anemia    Elevated LFTs    Constipation    Symptomatic HIV infection (HCC)    Candida esophagitis (HCC)    Acute cytomegalovirus infection ([facility] [address]  Resolved Problems:    * No resolved hospital problems. *        Overall  course ;                                   are improving over time.         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 Debbie Pollock   CMV IgM and IgG is positive, ordering CMV PCR  No ophthalmological complaints  Patient is on broad-spectrum antibiotics  Plan for bronchoscopy  Esophageal candidiasis              Electronically signed by Armin Salinas MD

## 2021-05-14 NOTE — PROGRESS NOTES
[facility] -   Infectious diseases evaluation  admission date 5/11/2021    reason for consultation:   Pneumonia    Impression :   Current:  · Suspected HIV infection CD4 count 24, reactive screen test, pending confirmatory testing , viral load   · Suspected pneumonia ,cavitary and noncavitary lung nodules  · Suspicion for PCP vs miliary TB   · Multiple splenic hypodensities   · Esophageal candidiasis   · CMV IgM and IgG positive, PCR pending    · Thrush  · Positive T palladium antibodies pending confirmatory test  · Hyponatremia  · Smoking  · History of kidney stones        Recommendations   · Spoke to pulmonology possible bronchoscopy today and AFP to rule out TB  · If unable to bronchoscopy today need to be put on airborne precaution   · Ophthalmology consulted for CMV retinitis   · Continue IV Zithromax  · IV vancomycin and cefepime  · Continue Diflucan  · HIV viral load pending  · CD4 count 24  · Echocardiogram negative for vegetations   · Procalcitonin level 18.94   · Hepatitis panel negative  · QuantiFERON-TB test  · Stool for C. difficile negative  · Urine for Legionella antigen negative  · Urine for GC and Chlamydia DNA negative   · COVID-19 rapid test negative  · Follow blood cultures  · GI following        History of Present Illness:   Initial history:  Saurabh Harrison is a 45y.o.-year-old male presented to hospital with worsening shortness of breath associated with cough productive white phlegm, generalized weakness and subjective fever for 4 months, gradual onset, continues, moderate in severity, no alleviating or aggravating factors. Patient also had decreased appetite, dizziness, significant weight loss. He was constipated initially then developed diarrhea after stool softeners.   He also had thrush, tried several over-the-counter treatments with no improvement  He is homosexual, had multiple partners in the past, none of his previous partners has been sick to his knowledge, denied recent travel or sick contact, has not been sexually active since January. Patient was tested negative for HIV and COVID-19 infection as outpatient according to the patient. Work-up showed sodium of 126, liver enzymes elevated AST 94, ALT 64  CT chest showed cavitary and noncavitary lung nodules/multiple splenic hypodensities  Urinalysis showed to moderate hemoglobin, negative nitrate, negative leukocyte esterase  Lactic acid was 1  Lipase was 84    Interval changes  5/14/2021   He was seen and examined at bedside this morning. T-max 102.9. Patient states he is feeling a little better since admission. Patient continues to have productive yellow sputum cough. Patient denies any abdominal pain, diarrhea, nausea, vomiting, shortness of breath. Patient states he occasionally gets left eye blurry vision and floaters. Patient states he has been having this for the past few months. Patient Vitals for the past 8 hrs:   BP Temp Temp src Pulse Resp SpO2   05/14/21 0722 104/67 97.3 °F (36.3 °C) Oral 78 20 94 %         I have personally reviewed the past medical history, past surgical history, medications, social history, and family history, and I haveupdated the database accordingly. Allergies:   Bee venom     Review of Systems:     Review of Systems  Other than above 12 system review was negative  Physical Examination :       Physical Exam  Constitutional:       General: He is not in acute distress. Appearance: He is ill-appearing. HENT:      Head: Normocephalic and atraumatic. Right Ear: External ear normal.      Left Ear: External ear normal.      Nose: No congestion or rhinorrhea. Mouth/Throat:      Mouth: Mucous membranes are dry. Comments: Thrush  Eyes:      General: No scleral icterus. Conjunctiva/sclera: Conjunctivae normal.   Neck:      Musculoskeletal: Neck supple. No neck rigidity.    Cardiovascular:      Rate and Rhythm: Normal rate and regular rhythm. Heart sounds: No murmur. Pulmonary:      Effort: No respiratory distress. Breath sounds: Rhonchi present. No wheezing. Abdominal:      General: There is no distension. Palpations: Abdomen is soft. Tenderness: There is no abdominal tenderness. Musculoskeletal:      Right lower leg: No edema. Left lower leg: No edema. Skin:     General: Skin is dry. Coloration: Skin is pale. Skin is not jaundiced. Neurological:      General: No focal deficit present. Mental Status: He is oriented to person, place, and time.    Psychiatric:         Mood and Affect: Mood normal.         Behavior: Behavior normal.         Past Medical History:     Past Medical History:   Diagnosis Date    Hydronephrosis 2015    Pt unsure of dx time but believes about 6 yrs ago    Kidney stone        Past Surgical  History:     Past Surgical History:   Procedure Laterality Date    APPENDECTOMY  2005    Pt believes when he was about 25yrs     COLONOSCOPY N/A 5/13/2021    COLONOSCOPY DIAGNOSTIC performed by Barbra Latham MD at [facility] 5/13/2021    EGD BIOPSY performed by Barbra Latham MD at [facility]       Medications:      fluconazole  200 mg Intravenous Q24H    sulfamethoxazole-trimethoprim (BACTRIM) IVPB  5 mg/kg Intravenous Q8H    sodium chloride flush  5-40 mL Intravenous 2 times per day    [Held by provider] enoxaparin  40 mg Subcutaneous Daily    azithromycin  500 mg Intravenous Q24H    nicotine  1 patch Transdermal Daily    therapeutic multivitamin-minerals  1 tablet Oral Daily    ondansetron  4 mg Intravenous Once    cefepime  2,000 mg Intravenous Q12H       Social History:     Social History     Socioeconomic History    Marital status: Single     Spouse name: Not on file    Number of children: Not on file    Years of education: Not on file    Highest education level: Not on file   Occupational History    Not on file   Social Needs  Financial resource strain: Not on file    Food insecurity     Worry: Not on file     Inability: Not on file   Money360 needs     Medical: Not on file     Non-medical: Not on file   Tobacco Use    Smoking status: Current Every Day Smoker     Packs/day: 0.50     Types: Cigarettes    Smokeless tobacco: Never Used    Tobacco comment: HAsn't craved for past 5 months   Substance and Sexual Activity    Alcohol use: Not Currently    Drug use: Not Currently     Types: Marijuana     Comment: Feburary 2021 last time     Sexual activity: Not Currently     Partners: Male   Lifestyle    Physical activity     Days per week: Not on file     Minutes per session: Not on file    Stress: Not on file   Relationships    Social connections     Talks on phone: Not on file     Gets together: Not on file     Attends Gnosticism service: Not on file     Active member of club or organization: Not on file     Attends meetings of clubs or organizations: Not on file     Relationship status: Not on file    Intimate partner violence     Fear of current or ex partner: Not on file     Emotionally abused: Not on file     Physically abused: Not on file     Forced sexual activity: Not on file   Other Topics Concern    Not on file   Social History Narrative    Not on file       Family History:   History reviewed. No pertinent family history.    Medical Decision Making:   I have independently reviewed/ordered the following labs:    CBC with Differential:   Recent Labs     05/11/21  2344 05/12/21  1545 05/13/21  0533 05/14/21  0530   WBC 5.2 4.8 6.8 8.5   HGB 10.1*  --  9.3* 8.9*   HCT 29.8*  --  27.6* 26.7*     --  194 155   LYMPHOPCT 3* 2*  --   --    MONOPCT 3  --   --   --      BMP:  Recent Labs     05/13/21  0533 05/14/21  0530   * 128*   K 3.5* 3.4*   CL 99 98   CO2 24 21   BUN 10 10   CREATININE 0.68* 0.78   MG 2.0 2.0     Hepatic Function Panel:   Recent Labs     05/11/21  2344 05/13/21  0533   PROT 7.2 5.4* LABALBU 2.9* 2.1*   BILIDIR  --  0.62*   IBILI  --  0.23   BILITOT 1.05 0.85   ALKPHOS 121 88   ALT 64* 40   AST 94* 71*     No results for input(s): RPR in the last 72 hours. No results for input(s): HIV in the last 72 hours. No results for input(s): BC in the last 72 hours. Lab Results   Component Value Date    CREATININE 0.78 05/14/2021    GLUCOSE 94 05/14/2021       Detailed results: Thank you for allowing us to participate in the care of this patient. Please call with questions. This note is created with the assistance of a speech recognition program.  While intending to generate adocument that actually reflects the content of the visit, the document can still have some errors including those of syntax and sound a like substitutions which may escape proof reading. It such instances, actual meaningcan be extrapolated by contextual diversion. Isaak Andrews MD  Family Medicine Resident   Attending Physician Statement  I have discussed the care of the patient, including pertinent history and exam findings,  with the resident. I have reviewed the key elements of all parts of the encounter with the resident. I agree with the assessment, plan and orders as documented by the resident. Discussed with Dr. Curry Govea and Dr. Vince Cadena he was trying to arrange for bronchoscopy.   [facility]  [facility] [place]

## 2021-05-14 NOTE — CONSULTS
INPATIENT CONSULTATION    Irma Stewart is a 45y.o. year old who was admitted on 5/11/2021 for pneumonia, fever and weight loss, pt with HIV positivity. Ophthalmology consultation was requested due to complaints of floaters in right eye starting 2 mos ago, still able to read out of eye, no pain or diplopia.       Past Medical History:   Diagnosis Date    Hydronephrosis 2015    Pt unsure of dx time but believes about 6 yrs ago    Kidney stone        Past Ocular History: none    Past Surgical History:   Procedure Laterality Date    APPENDECTOMY  2005    Pt believes when he was about 25yrs     COLONOSCOPY N/A 5/13/2021    COLONOSCOPY DIAGNOSTIC performed by Aditya Dotson MD at [place] [address] N/A 5/13/2021    EGD BIOPSY performed by Aditya Dotson MD at [address] [place] [address] History     Socioeconomic History    Marital status: Single     Spouse name: None    Number of children: None    Years of education: None    Highest education level: None   Occupational History    None   Social Needs    Financial resource strain: None    Food insecurity     Worry: None     Inability: None    Transportation needs     Medical: None     Non-medical: None   Tobacco Use    Smoking status: Current Every Day Smoker     Packs/day: 0.50     Types: Cigarettes    Smokeless tobacco: Never Used    Tobacco comment: HAsn't craved for past 5 months   Substance and Sexual Activity    Alcohol use: Not Currently    Drug use: Not Currently     Types: Marijuana     Comment: Feburary 2021 last time     Sexual activity: Not Currently     Partners: Male   Lifestyle    Physical activity     Days per week: None     Minutes per session: None    Stress: None   Relationships    Social connections     Talks on phone: None     Gets together: None     Attends Druze service: None     Active member of club or organization: None     Attends meetings of clubs or organizations: None Relationship status: None    Intimate partner violence     Fear of current or ex partner: None     Emotionally abused: None     Physically abused: None     Forced sexual activity: None   Other Topics Concern    None   Social History Narrative    None       Scheduled Meds:   fluconazole  200 mg Intravenous Q24H    sulfamethoxazole-trimethoprim (BACTRIM) IVPB  5 mg/kg Intravenous Q8H    sodium chloride flush  5-40 mL Intravenous 2 times per day    [Held by provider] enoxaparin  40 mg Subcutaneous Daily    azithromycin  500 mg Intravenous Q24H    nicotine  1 patch Transdermal Daily    therapeutic multivitamin-minerals  1 tablet Oral Daily    ondansetron  4 mg Intravenous Once    cefepime  2,000 mg Intravenous Q12H     Continuous Infusions:   sodium chloride      sodium chloride 75 mL/hr at 05/13/21 1215    sodium chloride       PRN Meds:.perflutren lipid microspheres, sodium chloride flush, sodium chloride, potassium chloride **OR** potassium alternative oral replacement **OR** potassium chloride, magnesium sulfate, promethazine **OR** ondansetron, polyethylene glycol, acetaminophen **OR** acetaminophen, benzonatate, sodium chloride flush, sodium chloride    Allergies   Allergen Reactions    Bee Venom        Review of systems, ophthalmic: blurred vision, floaters/spots in vision      OPHTHALMOLOGY EXAMINATION    Near Card VA:     EOM: full OU  OD 20/50     OS 20/50     Without correction     Muscle Balance: grossly ortho    EOM:  full OU       Pupils:  PERRLA no APD        CVF: visual fields intact to confrontation bilaterally    Slit Lamp Exam/Penlight Exam:    OD         Lids/Lashes  normal      Conj/Sclera clear       K (cornea) clear without lesions       Ant. Chamb. quiet and deep     Iris  round and regular without neovascularization      Lens  clear     Ant. Vitr. Clear           OS  Lids/Lashes  normal      Conj/Sclera clear       K (cornea) clear without lesions       Ant. Chamb.  quiet and

## 2021-05-14 NOTE — PROGRESS NOTES
Comprehensive Nutrition Assessment    Type and Reason for Visit:  Initial, Positive Nutrition Screen(N/V/diarrhea, wt loss, poor appetite)    Nutrition Recommendations/Plan: Will continue General diet and add Ensure Enlive to all trays to increase protein and calorie intake. Nutrition Assessment:  Pt was admitted due to Fevers/Pneumonia/ HIV. Pt states he has been losing wt steadily for the past 4 month. EGD showed candida. Malnutrition Assessment:  Malnutrition Status:  Severe malnutrition    Context:  Acute Illness     Findings of the 6 clinical characteristics of malnutrition:  Energy Intake:  7 - 50% or less of estimated energy requirements for 5 or more days  Weight Loss:  Unable to assess(wt history is stated)     Body Fat Loss:  (Severe body fat loss) Triceps   Muscle Mass Loss:  (severe muscle mass loss) Calf (gastrocnemius)  Fluid Accumulation:  No significant fluid accumulation     Strength:  Not Performed    Estimated Daily Nutrient Needs:  Energy (kcal):  30 kcal/kg= 2000 kcal; Weight Used for Energy Requirements:  Admission(67 kg)     Protein (g):  1.5g/kg= 115-120 g protein; Weight Used for Protein Requirements:  Ideal          Nutrition Related Findings:  no edema, Labs (5/14) Na/K 128/3.4 Meds: Diflucan, Zofran, MVI, BM 5/14, no significant PMH      Wounds:  None       Current Nutrition Therapies:    DIET GENERAL;  Dietary Nutrition Supplements: Standard High Calorie Oral Supplement    Anthropometric Measures:  · Height: 5' 11\" (180.3 cm)  · Current Body Weight: 160 lb (72.6 kg)   · Admission Body Weight: 147 lb (66.7 kg)    · Usual Body Weight: (143-150# stated)     · Ideal Body Weight: 172 lbs; BMI: 22.3  · BMI Categories: Normal Weight (BMI 18.5-24. 9)       Nutrition Diagnosis:   · Severe malnutrition related to inadequate protein-energy intake as evidenced by severe loss of subcutaneous fat, severe muscle loss, poor intake prior to admission    Nutrition Interventions:   Food and/or Nutrient Delivery:  Continue Current Diet, Start Oral Nutrition Supplement  Nutrition Education/Counseling:  No recommendation at this time   Coordination of Nutrition Care:  Continue to monitor while inpatient    Goals:  po intake greater than 50%       Nutrition Monitoring and Evaluation:   Food/Nutrient Intake Outcomes:  Food and Nutrient Intake, Supplement Intake  Physical Signs/Symptoms Outcomes:  Biochemical Data, GI Status, Skin, Weight, Fluid Status or Edema     Discharge Planning:    Continue current diet, Continue Oral Nutrition Supplement     Electronically signed by Lorne Saldaña RD, BANG on 5/14/21 at 1:26 PM EDT    Contact: [phone]

## 2021-05-14 NOTE — PLAN OF CARE
Nutrition Problem #1: Severe malnutrition  Intervention: Food and/or Nutrient Delivery: Continue Current Diet, Start Oral Nutrition Supplement  Nutritional Goals: po intake greater than 50%

## 2021-05-15 PROBLEM — H30.90 CMV RETINITIS (HCC): Status: ACTIVE | Noted: 2021-05-15

## 2021-05-15 PROBLEM — B25.9 CMV RETINITIS (HCC): Status: ACTIVE | Noted: 2021-05-15

## 2021-05-15 LAB
ANION GAP SERPL CALCULATED.3IONS-SCNC: 8 MMOL/L (ref 9–17)
BUN BLDV-MCNC: 9 MG/DL (ref 6–20)
BUN/CREAT BLD: ABNORMAL (ref 9–20)
CALCIUM IONIZED: 1.08 MMOL/L (ref 1.13–1.33)
CALCIUM SERPL-MCNC: 6.9 MG/DL (ref 8.6–10.4)
CHLORIDE BLD-SCNC: 99 MMOL/L (ref 98–107)
CO2: 21 MMOL/L (ref 20–31)
CREAT SERPL-MCNC: 0.86 MG/DL (ref 0.7–1.2)
GFR AFRICAN AMERICAN: >60 ML/MIN
GFR NON-AFRICAN AMERICAN: >60 ML/MIN
GFR SERPL CREATININE-BSD FRML MDRD: ABNORMAL ML/MIN/{1.73_M2}
GFR SERPL CREATININE-BSD FRML MDRD: ABNORMAL ML/MIN/{1.73_M2}
GLUCOSE BLD-MCNC: 102 MG/DL (ref 70–99)
HCT VFR BLD CALC: 24.9 % (ref 41–53)
HEMOGLOBIN: 8.5 G/DL (ref 13.5–17.5)
LIVER-KIDNEY MICROSOMAL AB: NORMAL
MCH RBC QN AUTO: 30.3 PG (ref 26–34)
MCHC RBC AUTO-ENTMCNC: 34.2 G/DL (ref 31–37)
MCV RBC AUTO: 88.6 FL (ref 80–100)
NRBC AUTOMATED: ABNORMAL PER 100 WBC
PDW BLD-RTO: 15.4 % (ref 11.5–14.9)
PLATELET # BLD: 132 K/UL (ref 150–450)
PMV BLD AUTO: 8.8 FL (ref 6–12)
POTASSIUM SERPL-SCNC: 3.8 MMOL/L (ref 3.7–5.3)
RBC # BLD: 2.81 M/UL (ref 4.5–5.9)
SODIUM BLD-SCNC: 128 MMOL/L (ref 135–144)
WBC # BLD: 8 K/UL (ref 3.5–11)

## 2021-05-15 PROCEDURE — 6360000002 HC RX W HCPCS: Performed by: INTERNAL MEDICINE

## 2021-05-15 PROCEDURE — 2580000003 HC RX 258: Performed by: INTERNAL MEDICINE

## 2021-05-15 PROCEDURE — 6370000000 HC RX 637 (ALT 250 FOR IP): Performed by: INTERNAL MEDICINE

## 2021-05-15 PROCEDURE — 99233 SBSQ HOSP IP/OBS HIGH 50: CPT | Performed by: INTERNAL MEDICINE

## 2021-05-15 PROCEDURE — 2580000003 HC RX 258: Performed by: STUDENT IN AN ORGANIZED HEALTH CARE EDUCATION/TRAINING PROGRAM

## 2021-05-15 PROCEDURE — 2060000000 HC ICU INTERMEDIATE R&B

## 2021-05-15 PROCEDURE — 80048 BASIC METABOLIC PNL TOTAL CA: CPT

## 2021-05-15 PROCEDURE — 6370000000 HC RX 637 (ALT 250 FOR IP): Performed by: STUDENT IN AN ORGANIZED HEALTH CARE EDUCATION/TRAINING PROGRAM

## 2021-05-15 PROCEDURE — 82330 ASSAY OF CALCIUM: CPT

## 2021-05-15 PROCEDURE — 6360000002 HC RX W HCPCS: Performed by: STUDENT IN AN ORGANIZED HEALTH CARE EDUCATION/TRAINING PROGRAM

## 2021-05-15 PROCEDURE — 36415 COLL VENOUS BLD VENIPUNCTURE: CPT

## 2021-05-15 PROCEDURE — 85027 COMPLETE CBC AUTOMATED: CPT

## 2021-05-15 PROCEDURE — 2500000003 HC RX 250 WO HCPCS: Performed by: INTERNAL MEDICINE

## 2021-05-15 RX ADMIN — SODIUM CHLORIDE, PRESERVATIVE FREE 10 ML: 5 INJECTION INTRAVENOUS at 22:46

## 2021-05-15 RX ADMIN — VALGANCICLOVIR 900 MG: 450 TABLET ORAL at 22:43

## 2021-05-15 RX ADMIN — SODIUM CHLORIDE: 9 INJECTION, SOLUTION INTRAVENOUS at 00:46

## 2021-05-15 RX ADMIN — SULFAMETHOXAZOLE AND TRIMETHOPRIM 334.4 MG: 80; 16 INJECTION, SOLUTION, CONCENTRATE INTRAVENOUS at 00:45

## 2021-05-15 RX ADMIN — SULFAMETHOXAZOLE AND TRIMETHOPRIM 334.4 MG: 80; 16 INJECTION, SOLUTION, CONCENTRATE INTRAVENOUS at 10:21

## 2021-05-15 RX ADMIN — SODIUM CHLORIDE, PRESERVATIVE FREE 10 ML: 5 INJECTION INTRAVENOUS at 09:10

## 2021-05-15 RX ADMIN — SULFAMETHOXAZOLE AND TRIMETHOPRIM 334.4 MG: 80; 16 INJECTION, SOLUTION, CONCENTRATE INTRAVENOUS at 17:21

## 2021-05-15 RX ADMIN — CEFEPIME 2000 MG: 2 INJECTION, POWDER, FOR SOLUTION INTRAVENOUS at 04:34

## 2021-05-15 RX ADMIN — CALCIUM GLUCONATE 1000 MG: 98 INJECTION, SOLUTION INTRAVENOUS at 13:59

## 2021-05-15 RX ADMIN — SODIUM CHLORIDE, PRESERVATIVE FREE 10 ML: 5 INJECTION INTRAVENOUS at 00:45

## 2021-05-15 RX ADMIN — VALGANCICLOVIR 900 MG: 450 TABLET ORAL at 10:07

## 2021-05-15 RX ADMIN — CEFEPIME 2000 MG: 2 INJECTION, POWDER, FOR SOLUTION INTRAVENOUS at 16:23

## 2021-05-15 RX ADMIN — MULTIPLE VITAMINS W/ MINERALS TAB 1 TABLET: TAB at 09:07

## 2021-05-15 RX ADMIN — FLUCONAZOLE 200 MG: 200 INJECTION, SOLUTION INTRAVENOUS at 15:16

## 2021-05-15 ASSESSMENT — ENCOUNTER SYMPTOMS
DIARRHEA: 1
NAUSEA: 0
ABDOMINAL PAIN: 0
SHORTNESS OF BREATH: 0

## 2021-05-15 ASSESSMENT — PAIN SCALES - GENERAL
PAINLEVEL_OUTOF10: 0
PAINLEVEL_OUTOF10: 0

## 2021-05-15 ASSESSMENT — VISUAL ACUITY: OU: 1

## 2021-05-15 NOTE — CARE COORDINATION
DISCHARGE PLANNING NOTE:    Plan remains for patient to be discharged home. Possible bronch on Monday. Active order for IV Zithro, IV Cefepime, IV Diflucan, and IV Bactrim. New HIV dx? Will need OP follow up. Will continue to follow for additional discharge needs.     Electronically signed by Lia Mark RN on 5/15/2021 at 3:22 PM

## 2021-05-15 NOTE — PROGRESS NOTES
[facility] -   Infectious diseases evaluation  admission date 5/11/2021    reason for consultation:   Pneumonia    Impression :   Current:  · Suspected HIV infection CD4 count 24,reactive screen test  · Suspected pneumonia ,cavitary and noncavitary lung nodules  · Multiple splenic hypodensities   · Esophageal candidiasis   · Suspected CMV retinitis  · Thrush  · Latent syphilis   · Hyponatremia  · Smoking  · History of kidney stones        Recommendations   · Bronchoscopy planned Monday  · Discussed with ophthalmology  ·  IV Zithromax   ·  IV  cefepime   · Continue Diflucan  · Continue IV Bactrim  · Valganciclovir p.o. 900 twice a day  · HIV viral load pending  · CD4 count 24  · Echocardiogram negative for vegetations   · Hepatitis panel negative  · QuantiFERON-TB test pending  · Stool for C. difficile negative  · Urine for Legionella antigen negative  · Urine for GC and Chlamydia DNA negative   · COVID-19 rapid test negative  · Follow blood cultures          History of Present Illness:   Initial history:  Todd Dodd is a 45y.o.-year-old male presented to hospital with worsening shortness of breath associated with cough productive white phlegm, generalized weakness and subjective fever for 4 months, gradual onset, continues, moderate in severity, no alleviating or aggravating factors. Patient also had decreased appetite, dizziness, significant weight loss. He was constipated initially then developed diarrhea after stool softeners. He also had thrush, tried several over-the-counter treatments with no improvement  He is homosexual, had multiple partners in the past, none of his previous partners has been sick to his knowledge, denied recent travel or sick contact, has not been sexually active since January. Patient was tested negative for HIV and COVID-19 infection as outpatient according to the patient.   Work-up showed sodium of 126, liver enzymes elevated AST 94, ALT 64  CT chest showed cavitary and noncavitary lung nodules/multiple splenic hypodensities  Urinalysis showed to moderate hemoglobin, negative nitrate, negative leukocyte esterase  Lactic acid was 1  Lipase was 84    Interval changes  5/15/2021   He was seen and examined at bedside. He still coughing with white phlegm, had loose stool, denied shortness of breath, denied fever or chills, complaining of floaters in the right eye. Patient Vitals for the past 8 hrs:   BP Temp Temp src Pulse Resp SpO2   05/15/21 0830 103/63 99.3 °F (37.4 °C) Axillary 101 16 97 %         I have personally reviewed the past medical history, past surgical history, medications, social history, and family history, and I haveupdated the database accordingly. Allergies:   Bee venom     Review of Systems:     Review of Systems  Other than above 12 system review was negative  Physical Examination :       Physical Exam  Constitutional:       General: He is not in acute distress. Appearance: He is ill-appearing. HENT:      Head: Normocephalic and atraumatic. Right Ear: External ear normal.      Left Ear: External ear normal.      Nose: No congestion or rhinorrhea. Mouth/Throat:      Mouth: Mucous membranes are dry. Comments: Thrush  Eyes:      General: No scleral icterus. Conjunctiva/sclera: Conjunctivae normal.   Cardiovascular:      Rate and Rhythm: Normal rate and regular rhythm. Heart sounds: No murmur heard. Pulmonary:      Effort: No respiratory distress. Breath sounds: Rhonchi present. No wheezing. Abdominal:      General: There is no distension. Palpations: Abdomen is soft. Tenderness: There is no abdominal tenderness. Musculoskeletal:      Cervical back: Neck supple. No rigidity. Right lower leg: No edema. Left lower leg: No edema. Skin:     General: Skin is dry. Coloration: Skin is pale. Skin is not jaundiced.    Neurological:      General: No focal Narrative    Not on file     Social Determinants of Health     Financial Resource Strain:     Difficulty of Paying Living Expenses:    Food Insecurity:     Worried About Running Out of Food in the Last Year:     [address] in the Last Year:    Transportation Needs:     Lack of Transportation (Medical):  Lack of Transportation (Non-Medical):    Physical Activity:     Days of Exercise per Week:     Minutes of Exercise per Session:    Stress:     Feeling of Stress :    Social Connections:     Frequency of Communication with Friends and Family:     Frequency of Social Gatherings with Friends and Family:     Attends Spiritism Services:     Active Member of Clubs or Organizations:     Attends Club or Organization Meetings:     Marital Status:    Intimate Partner Violence:     Fear of Current or Ex-Partner:     Emotionally Abused:     Physically Abused:     Sexually Abused:        Family History:   History reviewed. No pertinent family history. Medical Decision Making:   I have independently reviewed/ordered the following labs:    CBC with Differential:   Recent Labs     05/12/21  1545 05/14/21  0530 05/15/21  0543   WBC 4.8 8.5 8.0   HGB  --  8.9* 8.5*   HCT  --  26.7* 24.9*   PLT  --  155 132*   LYMPHOPCT 2*  --   --      BMP:  Recent Labs     05/13/21  0533 05/14/21  0530 05/15/21  0543   * 128* 128*   K 3.5* 3.4* 3.8   CL 99 98 99   CO2 24 21 21   BUN 10 10 9   CREATININE 0.68* 0.78 0.86   MG 2.0 2.0  --      Hepatic Function Panel:   Recent Labs     05/13/21  0533   PROT 5.4*   LABALBU 2.1*   BILIDIR 0.62*   IBILI 0.23   BILITOT 0.85   ALKPHOS 88   ALT 40   AST 71*     No results for input(s): RPR in the last 72 hours. No results for input(s): HIV in the last 72 hours. No results for input(s): BC in the last 72 hours. Lab Results   Component Value Date    CREATININE 0.86 05/15/2021    GLUCOSE 102 05/15/2021       Detailed results:         Thank you for allowing us to participate in the care of this patient. Please call with questions. This note is created with the assistance of a speech recognition program.  While intending to generate adocument that actually reflects the content of the visit, the document can still have some errors including those of syntax and sound a like substitutions which may escape proof reading. It such instances, actual meaningcan be extrapolated by contextual diversion.     Manjinder Vega MD

## 2021-05-15 NOTE — PLAN OF CARE
Problem: Falls - Risk of:  Goal: Will remain free from falls  Description: Will remain free from falls  Note: PATIENT WILL HAVE NO FALL WILL CONTINUE TO MONITOR     Problem: Infection:  Goal: Will remain free from infection  Description: Will remain free from infection  Note: Patient on antibiotics and antivirals will continue to monitor     Problem: Safety:  Goal: Free from accidental physical injury  Description: Free from accidental physical injury  Note: Patient will be free from injury will continue to assist where need be to prevent any injuries     Problem: Skin Integrity:  Goal: Skin integrity will stabilize  Description: Skin integrity will stabilize  Note: Patients skin will remain intact will continue to assist in movement to prevent any skin breakdown     Problem: Falls - Risk of:  Goal: Will remain free from falls  Description: Will remain free from falls  Note: PATIENT WILL HAVE NO FALL WILL CONTINUE TO MONITOR     Problem: Infection:  Goal: Will remain free from infection  Description: Will remain free from infection  Note: Patient on antibiotics and antivirals will continue to monitor     Problem: Safety:  Goal: Free from accidental physical injury  Description: Free from accidental physical injury  Note: Patient will be free from injury will continue to assist where need be to prevent any injuries     Problem: Skin Integrity:  Goal: Skin integrity will stabilize  Description: Skin integrity will stabilize  Note: Patients skin will remain intact will continue to assist in movement to prevent any skin breakdown

## 2021-05-15 NOTE — PROGRESS NOTES
[address] [facility]    PROGRESS NOTE             5/15/2021    10:33 AM    Name:   Prashant Sandhu  MRN:     [id number]     Acct:      [de-identified]   Room:   2098/2098[facility] Day:  3  Admit Date:  5/11/2021 10:51 PM    PCP:  No primary care provider on file. Code Status:  Full Code    Subjective:     C/C:   Chief Complaint   Patient presents with    Fever     Pt states fever, dizziness, dehydration, no appetite, constipation, weight loss, and several other complaints. Interval History Status: improved. Patient seen and examined at bedside. VSS. Still spiking fevers with last fever of 101.8 at 0100 last night. On day 4 of Zithromax, Cefepime. Day 3 of Diflucan and Bactrim. 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 This was discussed with ID Dr. Kia Johnson as well. Patient was given 1 dose of IV Ganciclovir and started on PO Valganciclovir 900 mg BID. Patient is resting comfortably in bed, states he is feeling better, denies any cough or shortness of breath at this time. Bronchoscopy scheduled for Monday. Brief History:     45year old male with no significant past medical history presented due to fever, night sweats, dry cough and shortness of breath that all began in January of 2021. Patient is homosexual, has sex with men. States he went to an urgent care clinic and was given a z pack in late January. Symptoms improved a little bit, but came back shortly after. States over the past month his cough has been worsening and he has been getting fevers and night sweats every day. Has had a 20-30 lb weight loss in the past 5 months. Complaining of decreased appetite as well. States he has been tested for HIV in the past and has always been negative.  Patient also has Oral Thrush.      CXR done in the ER showed small airway disease and reticulonodular interstitial disease. CT Chest Abdomen Pelvis done on 05/12 showing innumerable cavitating and non cavitating mostly sub centimeter pulmonary nodules and tree-in-bud nodules noted diffusely throughout the lungs, some are centrilobular in distribution. Trace pleural effusion. Multiple splenic hypo densities with ill defined margins up to 1 cm in size likely micro abscesses. ID and GI consulted. Patient started on Vancomycin, Cefepime and Azithromycin. HIV screen positive. EGD done on 05/13 showing candida esophagitis, started on IV Diflucan. Vancomycin discontinued due to negative MRSA swab. Patient started on IV Bactrim on 05/13.     Review of Systems:     Review of Systems   Constitutional: Positive for chills, fever and unexpected weight change. HENT: Negative for congestion. Eyes: Positive for visual disturbance (floaters in right eye). Respiratory: Negative for shortness of breath. Cardiovascular: Negative for chest pain. Gastrointestinal: Positive for diarrhea. Negative for abdominal pain and nausea. Endocrine: Negative for polyuria. Genitourinary: Negative for difficulty urinating. Skin: Negative for rash. Neurological: Negative for weakness and numbness. All other systems reviewed and are negative. Medications: Allergies:     Allergies   Allergen Reactions    Bee Venom        Current Meds:   Scheduled Meds:    valGANciclovir  900 mg Oral BID    fluconazole  200 mg Intravenous Q24H    sulfamethoxazole-trimethoprim (BACTRIM) IVPB  5 mg/kg Intravenous Q8H    sodium chloride flush  5-40 mL Intravenous 2 times per day    enoxaparin  40 mg Subcutaneous Daily    azithromycin  500 mg Intravenous Q24H    nicotine  1 patch Transdermal Daily    therapeutic multivitamin-minerals  1 tablet Oral Daily    ondansetron  4 mg Intravenous Once    cefepime  2,000 mg Intravenous Q12H     Continuous Infusions:    sodium chloride      sodium chloride 75 mL/hr at 05/15/21 0046    sodium chloride       PRN Meds: perflutren lipid microspheres, sodium chloride flush, sodium chloride, potassium chloride **OR** potassium alternative oral replacement **OR** potassium chloride, magnesium sulfate, promethazine **OR** ondansetron, polyethylene glycol, acetaminophen **OR** acetaminophen, benzonatate, sodium chloride flush, sodium chloride    Data:     Past Medical History:   has a past medical history of Hydronephrosis and Kidney stone. Social History:   reports that he has been smoking cigarettes. He has been smoking about 0.50 packs per day. He has never used smokeless tobacco. He reports previous alcohol use. He reports previous drug use. Drug: Marijuana. Family History: History reviewed. No pertinent family history. Vitals:  /63   Pulse 101   Temp 99.3 °F (37.4 °C) (Axillary)   Resp 16   Ht 5' 11\" (1.803 m)   Wt 164 lb 10.9 oz (74.7 kg)   SpO2 97%   BMI 22.97 kg/m²   Temp (24hrs), Av.7 °F (38.2 °C), Min:98.8 °F (37.1 °C), Max:103.1 °F (39.5 °C)    No results for input(s): POCGLU in the last 72 hours. I/O(24Hr):     Intake/Output Summary (Last 24 hours) at 5/15/2021 1033  Last data filed at 5/15/2021 0840  Gross per 24 hour   Intake 120 ml   Output 1550 ml   Net -1430 ml       Labs:    CBC with Differential:    Lab Results   Component Value Date    WBC 8.0 05/15/2021    RBC 2.81 05/15/2021    HGB 8.5 05/15/2021    HCT 24.9 05/15/2021     05/15/2021    MCV 88.6 05/15/2021    MCH 30.3 05/15/2021    MCHC 34.2 05/15/2021    RDW 15.4 05/15/2021    LYMPHOPCT 2 2021    MONOPCT 3 2021    BASOPCT 0 2021    MONOSABS 0.16 2021    LYMPHSABS 0.16 2021    EOSABS 0.00 2021    BASOSABS 0.00 2021    DIFFTYPE NOT REPORTED 2021     BMP:    Lab Results   Component Value Date     05/15/2021    K 3.8 05/15/2021    CL 99 05/15/2021    CO2 21 05/15/2021    BUN 9 05/15/2021    LABALBU 2.1 2021 CREATININE 0.86 05/15/2021    CALCIUM 6.9 05/15/2021    GFRAA >60 05/15/2021    LABGLOM >60 05/15/2021    GLUCOSE 102 05/15/2021       Lab Results   Component Value Date/Time    SPECIAL NOT REPORTED 05/11/2021 11:44 PM     Lab Results   Component Value Date/Time    CULTURE NO GROWTH 3 DAYS 05/11/2021 11:44 PM         Radiology:    ECHO Complete 2D W Doppler W Color    Result Date: 5/13/2021  [facility] Transthoracic Echocardiography Report (TTE)  Patient Name Amaris Paulson    Date of Study                 05/13/2021               [facility]   Date of      1980  Gender                        Male  Birth   Age          45 year(s)  Race                             Room Number  2098        Height:                       70.87 inch, 180 cm   Corporate ID [id number]    Weight:                       146 pounds, 66 kg  #   Patient Acct [de-identified]   BSA:           1.84 m^2       BMI:      20.37  #                                                                kg/m^2   MR #         [id number]      Sonographer                   Merari Neff   Accession #  [id number]  Interpreting Physician        Marion Solo   Fellow                   Referring Nurse Practitioner  Heydi Parr   Interpreting             Referring Physician  Fellow  Type of Study   TTE procedure:2D Echocardiogram, M-Mode, Doppler, Color Doppler. Procedure Date Date: 05/13/2021 Start: 10:51 AM Study Location: [facility] Technical Quality: Fair visualization Patient Status: Inpatient Height: 70.87 inches Weight: 145.53 pounds BSA: 1.84 m^2 BMI: 20.37 kg/m^2 Rhythm: Within normal limits HR: 86 bpm BP: 93/56 mmHg CONCLUSIONS Summary Small LV cavity. Estimated LV EF 50-55 %. Mild left ventricular hypertrophy. Normal right ventricular size and function. Thickened mitral valve leaflets. Mild mitral regurgitation. Mild tricuspid regurgitation. Normal right ventricular systolic pressure. Trivial pericardial effusion.  Signature ----------------------------------------------------------------------------  Electronically signed by Merari Neff(Sonographer) on 2021 03:03  PM ---------------------------------------------------------------------------- ----------------------------------------------------------------------------  Electronically signed by Marion Solo(Interpreting physician) on  2021 05:23 PM ---------------------------------------------------------------------------- FINDINGS Left Atrium Left atrium is normal in size. Left Ventricle Small LV cavity. Estimated LV EF 50-55 %. Mild left ventricular hypertrophy. Right Atrium Right atrium is normal in size. Right Ventricle Normal right ventricular size and function. Mitral Valve Thickened mitral valve leaflets. Mild mitral regurgitation. Aortic Valve Normal aortic valve structure and function without stenosis or regurgitation. Tricuspid Valve Normal tricuspid valve structure and function. Mild tricuspid regurgitation. Normal right ventricular systolic pressure. Pulmonic Valve The pulmonic valve is normal in structure. Trace pulmonic insufficiency. Pericardial Effusion Trivial pericardial effusion. Miscellaneous Normal aortic root dimension. IVC normal diameter & inspiratory collapse indicating normal RA filling pressure .  E/e' average 5.6 M-mode / 2D Measurements & Calculations:   LVIDd:3.92 cm(3.7 - 5.6 cm)      Diastolic FYLTBU:58.1 ml  IFUUD:0.45 cm(2.2 - 4.0 cm)      Systolic RUIMFY:57.5 ml  QHKP:6.02 cm(0.6 - 1.1 cm)       Aortic Root:3.2 cm(2.0 - 3.7 cm)  LVPWd:1.18 cm(0.6 - 1.1 cm)      LA Dimension: 3.6 cm(1.9 - 4.0 cm)  Fractional Shortenin.4 %     LA volume/Index: 28 ml /15m^2  Calculated LVEF (%): 69.1 %      LVOT:2.1 cm   Mitral:                                Aortic   Peak E-Wave: 0.78 m/s                  Peak Velocity: 1.24 m/s  Peak A-Wave: 0.52 m/s                  Mean Velocity: 0.76 m/s  E/A Ratio: 1.48                        Peak Gradient: 6.15 mmHg  Peak Gradient: 2.4 mmHg                Mean Gradient: 3 mmHg  Deceleration Time: 158 msec                                          Area (continuity): 2.57 cm^2                                         AV VTI: 20.2 cm   Tricuspid:   Peak TR Velocity: 1.79 m/s  Peak TR Gradient: 12.8164 mmHg  Septal Wall E' velocity:0.12 m/s Lateral Wall E' velocity:0.17 m/s    XR CHEST PORTABLE    Result Date: 5/11/2021  EXAMINATION: ONE XRAY VIEW OF THE CHEST 5/11/2021 11:27 pm COMPARISON: None. HISTORY: ORDERING SYSTEM PROVIDED HISTORY: Cough, SOB TECHNOLOGIST PROVIDED HISTORY: Cough, SOB Reason for Exam: Cough, SOB Acuity: Acute Type of Exam: Initial Additional signs and symptoms: Cough, SOB Relevant Medical/Surgical History: Cough, SOB FINDINGS: Lungs are hyperaerated. There are increased reticulonodular interstitial markings throughout. Mediastinum normal.  Bony thorax intact. Small airways disease and reticulonodular interstitial disease. Differential considerations include infectious and inflammatory interstitial lung disease. CT CHEST ABDOMEN PELVIS W CONTRAST    Result Date: 5/12/2021  EXAMINATION: CT OF THE CHEST, ABDOMEN, AND PELVIS WITH CONTRAST 5/12/2021 1:49 pm TECHNIQUE: CT of the chest, abdomen and pelvis was performed with the administration of intravenous contrast. Multiplanar reformatted images are provided for review. Dose modulation, iterative reconstruction, and/or weight based adjustment of the mA/kV was utilized to reduce the radiation dose to as low as reasonably achievable. COMPARISON: No priors HISTORY: ORDERING SYSTEM PROVIDED HISTORY: cachexia TECHNOLOGIST PROVIDED HISTORY: cachexia rule out lymphoma,legionella pneumonia? Reason for Exam: cachexia, rule out lymphoma,legionella pneumonia? Acuity: Unknown Type of Exam: Unknown FINDINGS: Chest: Mediastinum: The cardiac size is normal. 1.7 cm low right paratracheal lymph node. Smaller high right paratracheal lymph nodes.   Calcified subcarinal lymph nodes. .  The thyroid gland shows no significant abnormalities. The esophagus shows no significant abnormalities. Lungs/pleura: Innumerable round rim and tree-in-bud nodules/micro nodules some of which demonstrate cavitation. There is a dominant 1.8 x 3.1 cm consolidation anterior segment right upper lobe showing some air bronchograms. Findings most supportive of infectious etiology including pneumonias and septic emboli in appropriate clinical setting. Trace right pleural effusion. Minor subsegmental atelectasis at the posterior sulci. No pneumothorax. Soft Tissues/Bones: No acute abnormality of the bones. The superficial soft tissues show no significant abnormalities. Abdomen/Pelvis: Organs: Liver shows no focal lesions. Spleen demonstrates multiple scattered hypodensities with ill-defined margins to about a cm in size. Differential would include microabscesses and lymphoma. Some metastatic disease can have similar appearance. Kidneys, adrenal glands, pancreas and gallbladder unremarkable. GI/Bowel: No acute process. No bowel obstruction. No focal lesions. Pelvis: Urinary bladder grossly normal.  No suspicious pelvic mass. Peritoneum/Retroperitoneum: No free fluid. No retroperitoneal lymphadenopathy. Bones/Soft Tissues: No acute abnormality of the bones. The superficial soft tissues show no significant abnormalities. There are innumerable cavitating and non cavitating mostly subcentimeter pulmonary nodules and tree-in-bud nodules noted diffusely throughout the lungs. Some around them whereas others are centrilobular in distribution. Trace pleural effusion. Differential consideration is pneumonia versus septic emboli. Multiple scattered splenic hypodensities with ill-defined margins. These measure up to a cm in size. Most likely micro abscesses. Differential would include lymphoma and metastatic disease. Physical Examination:        Physical Exam  Vitals reviewed.    Constitutional: General: He is awake. He is not in acute distress. Appearance: He is cachectic. He is ill-appearing. HENT:      Head: Normocephalic. Eyes:      General: Vision grossly intact. Pupils: Pupils are equal, round, and reactive to light. Cardiovascular:      Rate and Rhythm: Normal rate and regular rhythm. Heart sounds: Normal heart sounds. Pulmonary:      Effort: Pulmonary effort is normal.      Breath sounds: Wheezing present. Abdominal:      General: Abdomen is flat. Palpations: Abdomen is soft. Tenderness: There is no abdominal tenderness. Musculoskeletal:      Cervical back: Neck supple. Right lower leg: No edema. Left lower leg: No edema. Skin:     Findings: No rash. Neurological:      Mental Status: He is alert and easily aroused. Psychiatric:         Behavior: Behavior is cooperative. 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] 05/15/2021    Symptomatic HIV infection ([facility] [B20] 05/13/2021    Candida esophagitis ([facility] [B37.81] 05/13/2021    Acute cytomegalovirus infection ([facility] [B25.9] 05/13/2021    Pneumonia due to organism [J18.9] 05/12/2021    Tobacco use [Z72.0] 05/12/2021    Weight loss [R63.4] 05/12/2021    Dehydration [E86.0] 05/12/2021    Hyponatremia [E87.1] 05/12/2021    Anemia [D64.9]     Elevated LFTs [R79.89]        Plan:        Bilateral Atypical Pneumonia 2/2 Suspected PCP Pneumonia in the Setting of Acute HIV Infection  - CXR on 05/11: Small airway disease and reticulonodular interstitial disease  - CT Chest Abdomen 05/12: Innumerable cavitating and non cavitating mostly sub centimeter pulmonary nodules and tree-in-bud nodules noted diffusely throughout the lungs, some are centrilobular in distribution (differentials include pneumonia or septic emboli). Trace pleural effusion.  Multiple splenic hypo densities

## 2021-05-15 NOTE — PLAN OF CARE
Problem: Falls - Risk of:  Goal: Will remain free from falls  Description: Will remain free from falls  Outcome: Ongoing     Problem: Patient Goal of the Day:  Goal: Patient Daily Goal Reviewed with:  Outcome: Ongoing     Problem: Infection:  Goal: Will remain free from infection  Description: Will remain free from infection  Outcome: Ongoing     Problem: Safety:  Goal: Free from accidental physical injury  Description: Free from accidental physical injury  Outcome: Ongoing     Problem: Skin Integrity:  Goal: Skin integrity will stabilize  Description: Skin integrity will stabilize  Outcome: Ongoing     Problem: Discharge Planning:  Goal: Patients continuum of care needs are met  Description: Patients continuum of care needs are met  Outcome: Ongoing     Problem: Nutrition  Goal: Optimal nutrition therapy  Description: Nutrition Problem #1: Severe malnutrition  Intervention: Food and/or Nutrient Delivery: Continue Current Diet, Start Oral Nutrition Supplement  Nutritional Goals: po intake greater than 50%     Outcome: Ongoing     Problem: Skin Integrity:  Goal: Will show no infection signs and symptoms  Description: Will show no infection signs and symptoms  Outcome: Ongoing

## 2021-05-16 LAB
ANION GAP SERPL CALCULATED.3IONS-SCNC: 10 MMOL/L (ref 9–17)
BUN BLDV-MCNC: 10 MG/DL (ref 6–20)
BUN/CREAT BLD: ABNORMAL (ref 9–20)
CALCIUM SERPL-MCNC: 7.5 MG/DL (ref 8.6–10.4)
CHLORIDE BLD-SCNC: 100 MMOL/L (ref 98–107)
CO2: 23 MMOL/L (ref 20–31)
CREAT SERPL-MCNC: 0.76 MG/DL (ref 0.7–1.2)
GFR AFRICAN AMERICAN: >60 ML/MIN
GFR NON-AFRICAN AMERICAN: >60 ML/MIN
GFR SERPL CREATININE-BSD FRML MDRD: ABNORMAL ML/MIN/{1.73_M2}
GFR SERPL CREATININE-BSD FRML MDRD: ABNORMAL ML/MIN/{1.73_M2}
GLUCOSE BLD-MCNC: 115 MG/DL (ref 70–99)
HCT VFR BLD CALC: 25.1 % (ref 41–53)
HEMOGLOBIN: 8.6 G/DL (ref 13.5–17.5)
MAGNESIUM: 2.1 MG/DL (ref 1.6–2.6)
MCH RBC QN AUTO: 30.7 PG (ref 26–34)
MCHC RBC AUTO-ENTMCNC: 34.2 G/DL (ref 31–37)
MCV RBC AUTO: 89.5 FL (ref 80–100)
NRBC AUTOMATED: ABNORMAL PER 100 WBC
PDW BLD-RTO: 15.4 % (ref 11.5–14.9)
PLATELET # BLD: 123 K/UL (ref 150–450)
PMV BLD AUTO: 9.7 FL (ref 6–12)
POTASSIUM SERPL-SCNC: 3.5 MMOL/L (ref 3.7–5.3)
QUANTI TB GOLD PLUS: NORMAL
QUANTI TB1 MINUS NIL: 0.01 IU/ML (ref 0–0.34)
QUANTI TB2 MINUS NIL: 0 IU/ML (ref 0–0.34)
QUANTIFERON MITOGEN: 0 IU/ML
QUANTIFERON NIL: 0.26 IU/ML
RBC # BLD: 2.8 M/UL (ref 4.5–5.9)
SODIUM BLD-SCNC: 133 MMOL/L (ref 135–144)
WBC # BLD: 7.8 K/UL (ref 3.5–11)

## 2021-05-16 PROCEDURE — 85027 COMPLETE CBC AUTOMATED: CPT

## 2021-05-16 PROCEDURE — 6370000000 HC RX 637 (ALT 250 FOR IP): Performed by: INTERNAL MEDICINE

## 2021-05-16 PROCEDURE — 2580000003 HC RX 258: Performed by: INTERNAL MEDICINE

## 2021-05-16 PROCEDURE — 6360000002 HC RX W HCPCS: Performed by: INTERNAL MEDICINE

## 2021-05-16 PROCEDURE — 2500000003 HC RX 250 WO HCPCS: Performed by: STUDENT IN AN ORGANIZED HEALTH CARE EDUCATION/TRAINING PROGRAM

## 2021-05-16 PROCEDURE — 2500000003 HC RX 250 WO HCPCS: Performed by: INTERNAL MEDICINE

## 2021-05-16 PROCEDURE — 83735 ASSAY OF MAGNESIUM: CPT

## 2021-05-16 PROCEDURE — 6370000000 HC RX 637 (ALT 250 FOR IP): Performed by: STUDENT IN AN ORGANIZED HEALTH CARE EDUCATION/TRAINING PROGRAM

## 2021-05-16 PROCEDURE — 6360000002 HC RX W HCPCS: Performed by: STUDENT IN AN ORGANIZED HEALTH CARE EDUCATION/TRAINING PROGRAM

## 2021-05-16 PROCEDURE — 6360000002 HC RX W HCPCS: Performed by: NURSE PRACTITIONER

## 2021-05-16 PROCEDURE — 99233 SBSQ HOSP IP/OBS HIGH 50: CPT | Performed by: INTERNAL MEDICINE

## 2021-05-16 PROCEDURE — 80048 BASIC METABOLIC PNL TOTAL CA: CPT

## 2021-05-16 PROCEDURE — 2580000003 HC RX 258: Performed by: STUDENT IN AN ORGANIZED HEALTH CARE EDUCATION/TRAINING PROGRAM

## 2021-05-16 PROCEDURE — 36415 COLL VENOUS BLD VENIPUNCTURE: CPT

## 2021-05-16 PROCEDURE — 87901 NFCT AGT GNTYP ALYS HIV1 REV: CPT

## 2021-05-16 PROCEDURE — 1200000000 HC SEMI PRIVATE

## 2021-05-16 RX ORDER — METOPROLOL TARTRATE 5 MG/5ML
5 INJECTION INTRAVENOUS EVERY 6 HOURS PRN
Status: DISCONTINUED | OUTPATIENT
Start: 2021-05-16 | End: 2021-05-18 | Stop reason: HOSPADM

## 2021-05-16 RX ORDER — LOPERAMIDE HYDROCHLORIDE 2 MG/1
2 CAPSULE ORAL 4 TIMES DAILY PRN
Status: DISCONTINUED | OUTPATIENT
Start: 2021-05-16 | End: 2021-05-18 | Stop reason: HOSPADM

## 2021-05-16 RX ADMIN — SULFAMETHOXAZOLE AND TRIMETHOPRIM 334.4 MG: 80; 16 INJECTION, SOLUTION, CONCENTRATE INTRAVENOUS at 17:31

## 2021-05-16 RX ADMIN — SODIUM CHLORIDE: 9 INJECTION, SOLUTION INTRAVENOUS at 14:51

## 2021-05-16 RX ADMIN — VALGANCICLOVIR 900 MG: 450 TABLET ORAL at 22:23

## 2021-05-16 RX ADMIN — ENOXAPARIN SODIUM 40 MG: 40 INJECTION SUBCUTANEOUS at 09:43

## 2021-05-16 RX ADMIN — VALGANCICLOVIR 900 MG: 450 TABLET ORAL at 09:45

## 2021-05-16 RX ADMIN — ACETAMINOPHEN 650 MG: 325 TABLET, FILM COATED ORAL at 18:30

## 2021-05-16 RX ADMIN — ACETAMINOPHEN 650 MG: 325 TABLET, FILM COATED ORAL at 01:22

## 2021-05-16 RX ADMIN — CALCIUM GLUCONATE 1000 MG: 98 INJECTION, SOLUTION INTRAVENOUS at 11:26

## 2021-05-16 RX ADMIN — POTASSIUM CHLORIDE 40 MEQ: 1500 TABLET, EXTENDED RELEASE ORAL at 11:05

## 2021-05-16 RX ADMIN — SULFAMETHOXAZOLE AND TRIMETHOPRIM 334.4 MG: 80; 16 INJECTION, SOLUTION, CONCENTRATE INTRAVENOUS at 01:22

## 2021-05-16 RX ADMIN — METOPROLOL TARTRATE 5 MG: 1 INJECTION, SOLUTION INTRAVENOUS at 18:24

## 2021-05-16 RX ADMIN — AZITHROMYCIN DIHYDRATE 500 MG: 500 INJECTION, POWDER, LYOPHILIZED, FOR SOLUTION INTRAVENOUS at 00:00

## 2021-05-16 RX ADMIN — CEFEPIME 2000 MG: 2 INJECTION, POWDER, FOR SOLUTION INTRAVENOUS at 03:45

## 2021-05-16 RX ADMIN — SULFAMETHOXAZOLE AND TRIMETHOPRIM 334.4 MG: 80; 16 INJECTION, SOLUTION, CONCENTRATE INTRAVENOUS at 09:43

## 2021-05-16 RX ADMIN — BICTEGRAVIR SODIUM, EMTRICITABINE, AND TENOFOVIR ALAFENAMIDE FUMARATE 1 TABLET: 50; 200; 25 TABLET ORAL at 12:37

## 2021-05-16 RX ADMIN — FLUCONAZOLE 200 MG: 200 INJECTION, SOLUTION INTRAVENOUS at 16:30

## 2021-05-16 RX ADMIN — MULTIPLE VITAMINS W/ MINERALS TAB 1 TABLET: TAB at 09:43

## 2021-05-16 RX ADMIN — CEFEPIME 2000 MG: 2 INJECTION, POWDER, FOR SOLUTION INTRAVENOUS at 15:56

## 2021-05-16 ASSESSMENT — PAIN SCALES - GENERAL
PAINLEVEL_OUTOF10: 0

## 2021-05-16 ASSESSMENT — ENCOUNTER SYMPTOMS
RESPIRATORY NEGATIVE: 1
DIARRHEA: 1
EYES NEGATIVE: 1

## 2021-05-16 NOTE — PROGRESS NOTES
Patient seen and examined Case discussed with infectious disease attending  Transfer to [facility] surg air borne isolation for possible tuberculosis  Test result for Navin Simmons is intermediate  [facility] and BAL tomorrow    Karri Petit MD  5/16/2021

## 2021-05-16 NOTE — PROGRESS NOTES
Genitourinary: Negative. Musculoskeletal: Negative. Skin: Negative. Neurological: Negative. Psychiatric/Behavioral: Negative. Medications: Allergies: Allergies   Allergen Reactions    Bee Venom        Current Meds:   Scheduled Meds:    valGANciclovir  900 mg Oral BID    fluconazole  200 mg Intravenous Q24H    sulfamethoxazole-trimethoprim (BACTRIM) IVPB  5 mg/kg Intravenous Q8H    sodium chloride flush  5-40 mL Intravenous 2 times per day    enoxaparin  40 mg Subcutaneous Daily    azithromycin  500 mg Intravenous Q24H    nicotine  1 patch Transdermal Daily    therapeutic multivitamin-minerals  1 tablet Oral Daily    ondansetron  4 mg Intravenous Once    cefepime  2,000 mg Intravenous Q12H     Continuous Infusions:    sodium chloride      sodium chloride 75 mL/hr at 05/15/21 0046    sodium chloride       PRN Meds: perflutren lipid microspheres, sodium chloride flush, sodium chloride, potassium chloride **OR** potassium alternative oral replacement **OR** potassium chloride, magnesium sulfate, promethazine **OR** ondansetron, polyethylene glycol, acetaminophen **OR** acetaminophen, benzonatate, sodium chloride flush, sodium chloride    Data:     Past Medical History:   has a past medical history of HIV (human immunodeficiency virus infection) ([facility] [address] Hydronephrosis, and Kidney stone. Social History:   reports that he has been smoking cigarettes. He has been smoking about 0.50 packs per day. He has never used smokeless tobacco. He reports previous alcohol use. He reports previous drug use. Drug: Marijuana. Family History: History reviewed. No pertinent family history.     Vitals:  /69   Pulse 97   Temp 101.5 °F (38.6 °C) (Oral)   Resp 18   Ht 5' 11\" (1.803 m)   Wt 167 lb 8.8 oz (76 kg)   SpO2 93%   BMI 23.37 kg/m²   Temp (24hrs), Av.1 °F (37.8 °C), Min:97.3 °F (36.3 °C), Max:102.9 °F (39.4 °C)    No results for input(s): POCGLU in the last 72 hours.    I/O(24Hr): Intake/Output Summary (Last 24 hours) at 5/16/2021 0805  Last data filed at 5/16/2021 0415  Gross per 24 hour   Intake 2715 ml   Output 3450 ml   Net -735 ml       Labs:    [unfilled]    Lab Results   Component Value Date/Time    SPECIAL NOT REPORTED 05/11/2021 11:44 PM     Lab Results   Component Value Date/Time    CULTURE NO GROWTH 3 DAYS 05/11/2021 11:44 PM       [unfilled]    Radiology:    ECHO Complete 2D W Doppler W Color    Result Date: 5/13/2021  1604 Formerly named [facility] Transthoracic Echocardiography Report (TTE)  Patient Name Silvia Hallman    Date of Study                 05/13/2021               [facility]   Date of      1980  Gender                        Male  Birth   Age          45 year(s)  Race                             Room Number  2098        Height:                       70.87 inch, 180 cm   Corporate ID [id number]    Weight:                       146 pounds, 66 kg  #   Patient Acct [de-identified]   BSA:           1.84 m^2       BMI:      20.37  #                                                                kg/m^2   MR #         [id number]      Sonographer                   Merari Neff   Accession #  [id number]  Interpreting Physician        Marion Solo   Fellow                   Referring Nurse Practitioner  Major Every   Interpreting             Referring Physician  Fellow  Type of Study   TTE procedure:2D Echocardiogram, M-Mode, Doppler, Color Doppler. Procedure Date Date: 05/13/2021 Start: 10:51 AM Study Location: [address] [place] [address] Technical Quality: Fair visualization Patient Status: Inpatient Height: 70.87 inches Weight: 145.53 pounds BSA: 1.84 m^2 BMI: 20.37 kg/m^2 Rhythm: Within normal limits HR: 86 bpm BP: 93/56 mmHg CONCLUSIONS Summary Small LV cavity. Estimated LV EF 50-55 %. Mild left ventricular hypertrophy. Normal right ventricular size and function. Thickened mitral valve leaflets. Mild mitral regurgitation. Mild tricuspid regurgitation. Normal right ventricular systolic pressure. Trivial pericardial effusion. Signature ----------------------------------------------------------------------------  Electronically signed by Merari Neff(Sonographer) on 2021 03:03  PM ---------------------------------------------------------------------------- ----------------------------------------------------------------------------  Electronically signed by Marion Solo(Interpreting physician) on  2021 05:23 PM ---------------------------------------------------------------------------- FINDINGS Left Atrium Left atrium is normal in size. Left Ventricle Small LV cavity. Estimated LV EF 50-55 %. Mild left ventricular hypertrophy. Right Atrium Right atrium is normal in size. Right Ventricle Normal right ventricular size and function. Mitral Valve Thickened mitral valve leaflets. Mild mitral regurgitation. Aortic Valve Normal aortic valve structure and function without stenosis or regurgitation. Tricuspid Valve Normal tricuspid valve structure and function. Mild tricuspid regurgitation. Normal right ventricular systolic pressure. Pulmonic Valve The pulmonic valve is normal in structure. Trace pulmonic insufficiency. Pericardial Effusion Trivial pericardial effusion. Miscellaneous Normal aortic root dimension. IVC normal diameter & inspiratory collapse indicating normal RA filling pressure .  E/e' average 5.6 M-mode / 2D Measurements & Calculations:   LVIDd:3.92 cm(3.7 - 5.6 cm)      Diastolic QNAPGI:67.4 ml  IQAQW:0.92 cm(2.2 - 4.0 cm)      Systolic OJKKRT:99.3 ml  ZKFV:8.52 cm(0.6 - 1.1 cm)       Aortic Root:3.2 cm(2.0 - 3.7 cm)  LVPWd:1.18 cm(0.6 - 1.1 cm)      LA Dimension: 3.6 cm(1.9 - 4.0 cm)  Fractional Shortenin.4 %     LA volume/Index: 28 ml /15m^2  Calculated LVEF (%): 69.1 %      LVOT:2.1 cm   Mitral:                                Aortic   Peak E-Wave: 0.78 m/s                  Peak Velocity: 1.24 m/s  Peak A-Wave: 0.52 m/s disease. Physical Examination:        Physical Exam  Constitutional:       Appearance: Normal appearance. He is normal weight. HENT:      Mouth/Throat:      Mouth: Mucous membranes are dry. Eyes:      Conjunctiva/sclera: Conjunctivae normal.   Cardiovascular:      Rate and Rhythm: Normal rate and regular rhythm. Pulses: Normal pulses. Heart sounds: Normal heart sounds. Pulmonary:      Effort: Pulmonary effort is normal.      Breath sounds: Normal breath sounds. Abdominal:      Palpations: Abdomen is soft. Tenderness: There is no abdominal tenderness. Musculoskeletal:      Left lower leg: No edema. Skin:     General: Skin is warm. Capillary Refill: Capillary refill takes 2 to 3 seconds. Neurological:      Mental Status: He is alert. Mental status is at baseline. Assessment:        Primary Problem  Symptomatic HIV infection [facility])    Active Hospital Problems    Diagnosis Date Noted    CMV retinitis ([facility] [address] [B25.9, H30.90] 05/15/2021    Symptomatic HIV infection ([address] [B20] 05/13/2021    Candida esophagitis ([facility] [address] [B37.81] 05/13/2021    Acute cytomegalovirus infection ([address] [B25.9] 05/13/2021    Pneumonia due to organism [J18.9] 05/12/2021    Tobacco use [Z72.0] 05/12/2021    Weight loss [R63.4] 05/12/2021    Dehydration [E86.0] 05/12/2021    Hyponatremia [E87.1] 05/12/2021    Anemia [D64.9]     Elevated LFTs [R79.89]        Plan:      Hospital Day #4    Bilateral Atypical Pneumonia 2/2 Suspected PCP Pneumonia in the Setting of Acute HIV Infection  - CXR on 05/11: Small airway disease and reticulonodular interstitial disease  - CT Chest Abdomen 05/12: Innumerable cavitating and non cavitating mostly sub centimeter pulmonary nodules and tree-in-bud nodules noted diffusely throughout the lungs, some are centrilobular in distribution (differentials include pneumonia or septic emboli). Trace pleural effusion.  Multiple splenic hypo densities with ill defined margins S/P one dose of IV Ganciclovir 5 mg/kg  - PO Valganciclovir 900 mg BID day 2  - Ophthalmology on board              - Areas of granular atrophic changes in sectoral pattern superonasally              - Scattered cotton wool spots bilaterally              - Would recommend antiviral CMV treatment              - Follow up with Dr. Yomi Smith at [address] after DC     Candida Esophagitis   - EGD 05/13: Significant esophagitis involving entire esophagus with white thick cottage cheese type discharge consistent with candida esophagitis  - IV Fluconazole Day 4     Syphilis Infection  - Positive IgG  - VDRL positive  - ID on board appreciate recs     Hyponatremia ,improving   - Sodium today 133>128       Diet:   DVT Prophylaxis: Lovenox 40 mg SC daily   GI Prophylaxis:  None    Dario Arrington MD  5/16/2021  8:05 AM

## 2021-05-16 NOTE — PLAN OF CARE
Problem: Falls - Risk of:  Goal: Will remain free from falls  Description: Will remain free from falls  5/16/2021 1506 by Kevyn Mobley RN  Outcome: Ongoing  Note: No falls this shift. Call light within reach and siderails x2. Bed in lowest position. Patient safety maintained. Problem: Falls - Risk of:  Goal: Absence of physical injury  Description: Absence of physical injury  5/16/2021 1506 by Kevyn Mobley RN  Outcome: Ongoing  Note: No falls this shift. Call light within reach and siderails x2. Bed in lowest position. Patient safety maintained. Problem: Patient Goal of the Day:  Goal: Patient Daily Goal Reviewed with:  Outcome: Ongoing     Problem: Patient Goal of the Day:  Goal: Short Term/Patient Specific Goal:  Outcome: Ongoing     Problem: Patient Goal of the Day:  Goal: Patient's reason for admission:  Outcome: Ongoing     Problem: Infection:  Goal: Will remain free from infection  Description: Will remain free from infection  5/16/2021 1506 by Kevyn Mobley RN  Outcome: Ongoing     Problem: Safety:  Goal: Free from accidental physical injury  Description: Free from accidental physical injury  Outcome: Ongoing  Note: No falls this shift. Call light within reach and siderails x2. Bed in lowest position. Patient safety maintained. Problem: Safety:  Goal: Free from intentional harm  Description: Free from intentional harm  Outcome: Ongoing     Problem: Skin Integrity:  Goal: Skin integrity will stabilize  Description: Skin integrity will stabilize  Outcome: Ongoing  Note: Skin assessment as charted. No new areas of breakdown.        Problem: Discharge Planning:  Goal: Patients continuum of care needs are met  Description: Patients continuum of care needs are met  Outcome: Ongoing  Note: Case management is following for further discharge needs      Problem: Nutrition  Goal: Optimal nutrition therapy  Description: Nutrition Problem #1: Severe malnutrition  Intervention: Food and/or

## 2021-05-16 NOTE — PROGRESS NOTES
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 900 twice a day  · Start Biktarvy  · Penicillin G IM 2.4 million weekly for 3 doses, give the first dose prior to discharge. · HIV viral load pending  · CD4 count 24  · Echocardiogram negative for vegetations   · Hepatitis panel negative  · CMV PCR pending  · Stool for C. difficile negative  · Urine for Legionella antigen negative  · Urine for GC and Chlamydia DNA negative   · COVID-19 rapid test negative  · Follow blood cultures no growth to date  · Airborne isolation for now, may discontinue if AFB smear negative on bronchoscopy. · HIV genotype  · Discussed with Dr. Patience Trujillo            History of Present Illness:   Initial history:  Rachel Hubbard is a 45y.o.-year-old male presented to hospital with worsening shortness of breath associated with cough productive white phlegm, generalized weakness and subjective fever for 4 months, gradual onset, continues, moderate in severity, no alleviating or aggravating factors. Patient also had decreased appetite, dizziness, significant weight loss. He was constipated initially then developed diarrhea after stool softeners.   He also had thrush, tried several over-the-counter treatments with no improvement  He is homosexual, had multiple partners in the past, none of his previous partners has been sick to his knowledge, denied recent travel or sick contact, has not been sexually active since January. Patient was tested negative for HIV and COVID-19 infection as outpatient according to the patient. Work-up showed sodium of 126, liver enzymes elevated AST 94, ALT 64  CT chest showed cavitary and noncavitary lung nodules/multiple splenic hypodensities  Urinalysis showed to moderate hemoglobin, negative nitrate, negative leukocyte esterase  Lactic acid was 1  Lipase was 84  HIV screen test was positive, HIV multisport test intermediate for HIV antibody  CMV IgM and IgG positive  Quantitative VDRL 16    Interval changes  5/16/2021   He was seen and examined at bedside. He is complaining of loose stool, still coughing with white phlegm, denied shortness of breath, denied nausea or vomiting, no abdominal pain, no other complaints. Patient Vitals for the past 8 hrs:   BP Temp Temp src Pulse Resp SpO2   05/16/21 0800 102/77 97.3 °F (36.3 °C) Oral 95 18 95 %   05/16/21 0215 105/69 101.5 °F (38.6 °C) Oral 97 18 93 %         I have personally reviewed the past medical history, past surgical history, medications, social history, and family history, and I haveupdated the database accordingly. Allergies:   Bee venom     Review of Systems:     Review of Systems  Other than above 12 system review was negative  Physical Examination :       Physical Exam  Constitutional:       General: He is not in acute distress. Appearance: He is ill-appearing. HENT:      Head: Normocephalic and atraumatic. Right Ear: External ear normal.      Left Ear: External ear normal.      Nose: No congestion or rhinorrhea. Mouth/Throat:      Mouth: Mucous membranes are dry. Comments: Thrush  Eyes:      General: No scleral icterus.      Conjunctiva/sclera: Conjunctivae normal.   Cardiovascular:      Rate and Rhythm: Normal rate and regular rhythm. Heart sounds: No murmur heard. Pulmonary:      Effort: No respiratory distress. Breath sounds: Rhonchi present. No wheezing. Abdominal:      General: There is no distension. Palpations: Abdomen is soft. Tenderness: There is no abdominal tenderness. Musculoskeletal:      Cervical back: Neck supple. No rigidity. Right lower leg: No edema. Left lower leg: No edema. Skin:     General: Skin is dry. Coloration: Skin is pale. Skin is not jaundiced. Neurological:      General: No focal deficit present. Mental Status: He is oriented to person, place, and time.    Psychiatric:         Mood and Affect: Mood normal.         Behavior: Behavior normal.         Past Medical History:     Past Medical History:   Diagnosis Date    HIV (human immunodeficiency virus infection) ([facility] [address]     Hydronephrosis 2015    Pt unsure of dx time but believes about 6 yrs ago    Kidney stone        Past Surgical  History:     Past Surgical History:   Procedure Laterality Date    APPENDECTOMY  2005    Pt believes when he was about 25yrs     COLONOSCOPY N/A 5/13/2021    COLONOSCOPY DIAGNOSTIC performed by Barbra Latham MD at [facility] 5/13/2021    EGD BIOPSY performed by Barbra Latham MD at [facility]       Medications:      calcium gluconate IVPB  1,000 mg Intravenous Once    valGANciclovir  900 mg Oral BID    fluconazole  200 mg Intravenous Q24H    sulfamethoxazole-trimethoprim (BACTRIM) IVPB  5 mg/kg Intravenous Q8H    sodium chloride flush  5-40 mL Intravenous 2 times per day    enoxaparin  40 mg Subcutaneous Daily    azithromycin  500 mg Intravenous Q24H    nicotine  1 patch Transdermal Daily    therapeutic multivitamin-minerals  1 tablet Oral Daily    ondansetron  4 mg Intravenous Once    cefepime  2,000 mg Intravenous Q12H       Social History:     Social History     Socioeconomic History    Marital status: Single     Spouse name: Not on file    Number of children: Not on file    Years of education: Not on file    Highest education level: Not on file   Occupational History    Not on file   Tobacco Use    Smoking status: Current Every Day Smoker     Packs/day: 0.50     Types: Cigarettes    Smokeless tobacco: Never Used    Tobacco comment: HAsn't craved for past 5 months   Vaping Use    Vaping Use: Never used   Substance and Sexual Activity    Alcohol use: Not Currently    Drug use: Not Currently     Types: Marijuana     Comment: Feburary 2021 last time     Sexual activity: Not Currently     Partners: Male   Other Topics Concern    Not on file   Social History Narrative    Not on file     Social Determinants of Health     Financial Resource Strain:     Difficulty of Paying Living Expenses:    Food Insecurity:     Worried About Running Out of Food in the Last Year:     Ran Out of Food in the Last Year:    Transportation Needs:     Lack of Transportation (Medical):  Lack of Transportation (Non-Medical):    Physical Activity:     Days of Exercise per Week:     Minutes of Exercise per Session:    Stress:     Feeling of Stress :    Social Connections:     Frequency of Communication with Friends and Family:     Frequency of Social Gatherings with Friends and Family:     Attends Hoahaoism Services:     Active Member of Clubs or Organizations:     Attends Club or Organization Meetings:     Marital Status:    Intimate Partner Violence:     Fear of Current or Ex-Partner:     Emotionally Abused:     Physically Abused:     Sexually Abused:        Family History:   History reviewed. No pertinent family history.    Medical Decision Making:   I have independently reviewed/ordered the following labs:    CBC with Differential:   Recent Labs     05/15/21  0543 05/16/21 0514   WBC 8.0 7.8   HGB 8.5* 8.6*   HCT 24.9* 25.1*   * 123*     BMP:  Recent Labs     05/14/21 0530 05/15/21  0543 05/16/21  0514   * 128* 133*   K 3.4* 3.8 3.5*   CL 98 99 100   CO2 21 21 23   BUN 10 9 10   CREATININE 0.78 0.86 0.76   MG 2.0  --  2.1     Hepatic Function Panel:   No results for input(s): PROT, LABALBU, BILIDIR, IBILI, BILITOT, ALKPHOS, ALT, AST in the last 72 hours. No results for input(s): RPR in the last 72 hours. No results for input(s): HIV in the last 72 hours. No results for input(s): BC in the last 72 hours. Lab Results   Component Value Date    CREATININE 0.76 05/16/2021    GLUCOSE 115 05/16/2021       Detailed results: Thank you for allowing us to participate in the care of this patient. Please call with questions. This note is created with the assistance of a speech recognition program.  While intending to generate adocument that actually reflects the content of the visit, the document can still have some errors including those of syntax and sound a like substitutions which may escape proof reading. It such instances, actual meaningcan be extrapolated by contextual diversion.     Julissa Hinds MD

## 2021-05-16 NOTE — PROGRESS NOTES
Dr. Dylon Townsend and House supervisor notified of MEWs of 5. Ice packs given to patient, and tylenol as ordered. Writer instructed to get temp down per Dr. Dylon Townsend, no other orders at this time.

## 2021-05-16 NOTE — PROGRESS NOTES
Writer talked to mother of patient. She was updated on plan of care. All questions and concerns answered. Writer informed mother that RN will call at 6 AM and 6 PM to update her everyday and no visitors at this time. She understood.

## 2021-05-16 NOTE — PROGRESS NOTES
Pulmonary Progress Note  [facility]      Patient - Michelle Garcia,  Age - 45 y.o.    - 1980      Room Number - 2068/2068-01   N -  [id number]   [facility] # - [de-identified]  Date of Admission -  2021 10:51 PM        Consulting Danielle Mcclure MD  Primary Care Physician - No primary care provider on file. SUBJECTIVE    on room air  Still having fever at night with chills, no sweats  Coughs clear sputum, no hemoptysis, no wheezing  short of breath with activity  Discussed with staff    OBJECTIVE   VITALS    height is 5' 11\" (1.803 m) and weight is 167 lb 8.8 oz (76 kg). His oral temperature is 102.3 °F (39.1 °C). His blood pressure is 120/81 and his pulse is 115. His respiration is 18 and oxygen saturation is 97%. Body mass index is 23.37 kg/m². Temperature Range: Temp: 102.3 °F (39.1 °C) Temp  Av.1 °F (37.8 °C)  Min: 97.3 °F (36.3 °C)  Max: 102.9 °F (39.4 °C)  BP Range:  Systolic (88HSY), BMK:628 , Min:101 , BRK:819     Diastolic (69LRZ), PWJ:91, Min:69, Max:86    Pulse Range: Pulse  Av.5  Min: 87  Max: 115  Respiration Range: Resp  Av  Min: 18  Max: 18  Current Pulse Ox[de-identified]  SpO2: 97 %  24HR Pulse Ox Range:  SpO2  Av %  Min: 92 %  Max: 97 %  Oxygen Amount and Delivery: O2 Flow Rate (L/min): 3 L/min    Wt Readings from Last 3 Encounters:   21 167 lb 8.8 oz (76 kg)       I/O (24 Hours)    Intake/Output Summary (Last 24 hours) at 2021 1844  Last data filed at 2021 1734  Gross per 24 hour   Intake 4917 ml   Output 2700 ml   Net 2217 ml       EXAM     General Appearance  Awake, alert,  in no acute distress  HEENT - normocephalic, atraumatic.    Neck -no JVD,  trachea midline   Lungs -coarse, decreased sounds, no wheezing or distress  Cardiovascular - Heart sounds are normal.  Regular rate and rhythm   Abdomen - Soft, nontender, nondistended, no guarding  Neurologic -appropriate, following commands, looks comfortable  Extremities - No clubbing, cyanosis, edema    MEDS      bictegravir-emtricitab-tenofovir alafenamide  1 tablet Oral Daily    valGANciclovir  900 mg Oral BID    fluconazole  200 mg Intravenous Q24H    sulfamethoxazole-trimethoprim (BACTRIM) IVPB  5 mg/kg Intravenous Q8H    sodium chloride flush  5-40 mL Intravenous 2 times per day    [Held by provider] enoxaparin  40 mg Subcutaneous Daily    nicotine  1 patch Transdermal Daily    therapeutic multivitamin-minerals  1 tablet Oral Daily    ondansetron  4 mg Intravenous Once    cefepime  2,000 mg Intravenous Q12H      sodium chloride      sodium chloride 75 mL/hr at 05/16/21 1451    sodium chloride       loperamide, metoprolol, perflutren lipid microspheres, sodium chloride flush, sodium chloride, potassium chloride **OR** potassium alternative oral replacement **OR** potassium chloride, magnesium sulfate, promethazine **OR** ondansetron, polyethylene glycol, acetaminophen **OR** acetaminophen, benzonatate, sodium chloride flush, sodium chloride    LABS   CBC   Recent Labs     05/16/21  0514   WBC 7.8   HGB 8.6*   HCT 25.1*   MCV 89.5   *     BMP:   Lab Results   Component Value Date     05/16/2021    K 3.5 05/16/2021     05/16/2021    CO2 23 05/16/2021    BUN 10 05/16/2021    LABALBU 2.1 05/13/2021    CREATININE 0.76 05/16/2021    CALCIUM 7.5 05/16/2021    GFRAA >60 05/16/2021    LABGLOM >60 05/16/2021     ABGs:  Lab Results   Component Value Date    PHART 7.493 05/13/2021    PO2ART 85.3 05/13/2021    EON4KTY 29.5 05/13/2021      Lab Results   Component Value Date    MODE NOT REPORTED 05/13/2021     Ionized Calcium:  No results found for: IONCA  Magnesium:    Lab Results   Component Value Date    MG 2.1 05/16/2021      Phosphorus:  No results found for: PHOS     LIVER PROFILE   No results for input(s): AST, ALT, LIPASE, BILIDIR, BILITOT, ALKPHOS in the last 72 hours. Invalid input(s):   AMYLASE,

## 2021-05-16 NOTE — CARE COORDINATION
DISCHARGE PLANNING NOTE:    Writer did not go in to speak with patient as he is being transferred to 2068 to be placed in Airborne Precautions for possible TB. Plan is for patient to return home upon discharge. Does not appear that he will need VNS. Spoke with Dr. Vanessa Anthony who states pt can follow up with Dr. Martina Lagos OP for new dx of HIV. Dr. Vanessa Anthony states pt could follow with her, however her office in [place] does not have all the social help/resources that Dr. Pamella Barron office has. Active order for IV Cefepime, IV Diflucan, and IV Bactrim. Pt to have bronch tomorrow. Will continue to follow for additional discharge needs.     Electronically signed by Kaleigh Griffith RN on 5/16/2021 at 10:45 AM

## 2021-05-17 ENCOUNTER — ANESTHESIA EVENT (OUTPATIENT)
Dept: ENDOSCOPY | Age: 41
DRG: 890 | End: 2021-05-17
Payer: MEDICAID

## 2021-05-17 ENCOUNTER — ANESTHESIA (OUTPATIENT)
Dept: ENDOSCOPY | Age: 41
DRG: 890 | End: 2021-05-17
Payer: MEDICAID

## 2021-05-17 VITALS
DIASTOLIC BLOOD PRESSURE: 56 MMHG | OXYGEN SATURATION: 95 % | RESPIRATION RATE: 14 BRPM | SYSTOLIC BLOOD PRESSURE: 92 MMHG

## 2021-05-17 PROBLEM — R91.8 MULTIPLE PULMONARY NODULES: Status: ACTIVE | Noted: 2021-05-17

## 2021-05-17 LAB
ANION GAP SERPL CALCULATED.3IONS-SCNC: 10 MMOL/L (ref 9–17)
BAL DIFF COMMENT: ABNORMAL
BUN BLDV-MCNC: 9 MG/DL (ref 6–20)
BUN/CREAT BLD: ABNORMAL (ref 9–20)
CALCIUM SERPL-MCNC: 7.4 MG/DL (ref 8.6–10.4)
CHLORIDE BLD-SCNC: 100 MMOL/L (ref 98–107)
CO2: 22 MMOL/L (ref 20–31)
CREAT SERPL-MCNC: 0.59 MG/DL (ref 0.7–1.2)
EOSINOPHILS BAL: ABNORMAL % (ref 0–1)
FLUID DIFF COMMENT: NORMAL
GFR AFRICAN AMERICAN: >60 ML/MIN
GFR NON-AFRICAN AMERICAN: >60 ML/MIN
GFR SERPL CREATININE-BSD FRML MDRD: ABNORMAL ML/MIN/{1.73_M2}
GFR SERPL CREATININE-BSD FRML MDRD: ABNORMAL ML/MIN/{1.73_M2}
GLUCOSE BLD-MCNC: 70 MG/DL (ref 70–99)
HCT VFR BLD CALC: 27.4 % (ref 41–53)
HEMOGLOBIN: 9.2 G/DL (ref 13.5–17.5)
LYMPHOCYTES, BAL: 14 % (ref 8–12)
MACROPHAGES, BAL: 38 % (ref 85–95)
MCH RBC QN AUTO: 30.1 PG (ref 26–34)
MCHC RBC AUTO-ENTMCNC: 33.6 G/DL (ref 31–37)
MCV RBC AUTO: 89.5 FL (ref 80–100)
NRBC AUTOMATED: ABNORMAL PER 100 WBC
PDW BLD-RTO: 15.7 % (ref 11.5–14.9)
PLATELET # BLD: 114 K/UL (ref 150–450)
PMV BLD AUTO: 9.1 FL (ref 6–12)
POTASSIUM SERPL-SCNC: 3.7 MMOL/L (ref 3.7–5.3)
RBC # BLD: 3.06 M/UL (ref 4.5–5.9)
RBC, BAL: 343 /MM3
SEGMENTED NEUTROPHILS, BAL: 48 % (ref 0–10)
SODIUM BLD-SCNC: 132 MMOL/L (ref 135–144)
SPECIMEN TYPE: NORMAL
WBC # BLD: 8.8 K/UL (ref 3.5–11)
WBC, BAL: 118 /MM3

## 2021-05-17 PROCEDURE — 2580000003 HC RX 258: Performed by: INTERNAL MEDICINE

## 2021-05-17 PROCEDURE — 88112 CYTOPATH CELL ENHANCE TECH: CPT

## 2021-05-17 PROCEDURE — 2500000003 HC RX 250 WO HCPCS

## 2021-05-17 PROCEDURE — 7100000000 HC PACU RECOVERY - FIRST 15 MIN: Performed by: INTERNAL MEDICINE

## 2021-05-17 PROCEDURE — 87116 MYCOBACTERIA CULTURE: CPT

## 2021-05-17 PROCEDURE — 88313 SPECIAL STAINS GROUP 2: CPT

## 2021-05-17 PROCEDURE — 6370000000 HC RX 637 (ALT 250 FOR IP): Performed by: INTERNAL MEDICINE

## 2021-05-17 PROCEDURE — 88312 SPECIAL STAINS GROUP 1: CPT

## 2021-05-17 PROCEDURE — 0B9D8ZX DRAINAGE OF RIGHT MIDDLE LUNG LOBE, VIA NATURAL OR ARTIFICIAL OPENING ENDOSCOPIC, DIAGNOSTIC: ICD-10-PCS | Performed by: INTERNAL MEDICINE

## 2021-05-17 PROCEDURE — 1200000000 HC SEMI PRIVATE

## 2021-05-17 PROCEDURE — 87081 CULTURE SCREEN ONLY: CPT

## 2021-05-17 PROCEDURE — 2709999900 HC NON-CHARGEABLE SUPPLY: Performed by: INTERNAL MEDICINE

## 2021-05-17 PROCEDURE — 3700000001 HC ADD 15 MINUTES (ANESTHESIA): Performed by: INTERNAL MEDICINE

## 2021-05-17 PROCEDURE — 6360000002 HC RX W HCPCS

## 2021-05-17 PROCEDURE — 6360000002 HC RX W HCPCS: Performed by: INTERNAL MEDICINE

## 2021-05-17 PROCEDURE — 88108 CYTOPATH CONCENTRATE TECH: CPT

## 2021-05-17 PROCEDURE — 80048 BASIC METABOLIC PNL TOTAL CA: CPT

## 2021-05-17 PROCEDURE — 87015 SPECIMEN INFECT AGNT CONCNTJ: CPT

## 2021-05-17 PROCEDURE — 2500000003 HC RX 250 WO HCPCS: Performed by: INTERNAL MEDICINE

## 2021-05-17 PROCEDURE — 87106 FUNGI IDENTIFICATION YEAST: CPT

## 2021-05-17 PROCEDURE — 89051 BODY FLUID CELL COUNT: CPT

## 2021-05-17 PROCEDURE — 87901 NFCT AGT GNTYP ALYS HIV1 REV: CPT

## 2021-05-17 PROCEDURE — 3609027000 HC BRONCHOSCOPY: Performed by: INTERNAL MEDICINE

## 2021-05-17 PROCEDURE — 87252 VIRUS INOCULATION TISSUE: CPT

## 2021-05-17 PROCEDURE — 86403 PARTICLE AGGLUT ANTBDY SCRN: CPT

## 2021-05-17 PROCEDURE — 7100000001 HC PACU RECOVERY - ADDTL 15 MIN: Performed by: INTERNAL MEDICINE

## 2021-05-17 PROCEDURE — 87102 FUNGUS ISOLATION CULTURE: CPT

## 2021-05-17 PROCEDURE — 87206 SMEAR FLUORESCENT/ACID STAI: CPT

## 2021-05-17 PROCEDURE — 87070 CULTURE OTHR SPECIMN AEROBIC: CPT

## 2021-05-17 PROCEDURE — 88305 TISSUE EXAM BY PATHOLOGIST: CPT

## 2021-05-17 PROCEDURE — 99233 SBSQ HOSP IP/OBS HIGH 50: CPT | Performed by: INTERNAL MEDICINE

## 2021-05-17 PROCEDURE — 87205 SMEAR GRAM STAIN: CPT

## 2021-05-17 PROCEDURE — 3700000000 HC ANESTHESIA ATTENDED CARE: Performed by: INTERNAL MEDICINE

## 2021-05-17 PROCEDURE — 87300 AG DETECTION POLYVAL IF: CPT

## 2021-05-17 PROCEDURE — 87255 GENET VIRUS ISOLATE HSV: CPT

## 2021-05-17 PROCEDURE — 87140 CULTURE TYPE IMMUNOFLUORESC: CPT

## 2021-05-17 PROCEDURE — 85027 COMPLETE CBC AUTOMATED: CPT

## 2021-05-17 PROCEDURE — 36415 COLL VENOUS BLD VENIPUNCTURE: CPT

## 2021-05-17 PROCEDURE — 99232 SBSQ HOSP IP/OBS MODERATE 35: CPT | Performed by: INTERNAL MEDICINE

## 2021-05-17 RX ORDER — BENZONATATE 100 MG/1
100 CAPSULE ORAL 3 TIMES DAILY
Status: DISCONTINUED | OUTPATIENT
Start: 2021-05-17 | End: 2021-05-18 | Stop reason: HOSPADM

## 2021-05-17 RX ORDER — MIDAZOLAM HYDROCHLORIDE 1 MG/ML
INJECTION INTRAMUSCULAR; INTRAVENOUS PRN
Status: DISCONTINUED | OUTPATIENT
Start: 2021-05-17 | End: 2021-05-17 | Stop reason: SDUPTHER

## 2021-05-17 RX ORDER — ALBUTEROL SULFATE 2.5 MG/3ML
SOLUTION RESPIRATORY (INHALATION)
Status: DISPENSED
Start: 2021-05-17 | End: 2021-05-18

## 2021-05-17 RX ORDER — ALBUTEROL SULFATE 2.5 MG/3ML
2.5 SOLUTION RESPIRATORY (INHALATION) ONCE
Status: COMPLETED | OUTPATIENT
Start: 2021-05-17 | End: 2021-05-17

## 2021-05-17 RX ORDER — LIDOCAINE HYDROCHLORIDE 40 MG/ML
INJECTION, SOLUTION RETROBULBAR; TOPICAL
Status: COMPLETED
Start: 2021-05-17 | End: 2021-05-17

## 2021-05-17 RX ORDER — PROPOFOL 10 MG/ML
INJECTION, EMULSION INTRAVENOUS PRN
Status: DISCONTINUED | OUTPATIENT
Start: 2021-05-17 | End: 2021-05-17 | Stop reason: SDUPTHER

## 2021-05-17 RX ORDER — LIDOCAINE HYDROCHLORIDE 10 MG/ML
INJECTION, SOLUTION EPIDURAL; INFILTRATION; INTRACAUDAL; PERINEURAL PRN
Status: DISCONTINUED | OUTPATIENT
Start: 2021-05-17 | End: 2021-05-17 | Stop reason: SDUPTHER

## 2021-05-17 RX ORDER — LIDOCAINE HYDROCHLORIDE 40 MG/ML
4 INJECTION, SOLUTION RETROBULBAR; TOPICAL ONCE
Status: COMPLETED | OUTPATIENT
Start: 2021-05-17 | End: 2021-05-17

## 2021-05-17 RX ORDER — GLYCOPYRROLATE 1 MG/5 ML
SYRINGE (ML) INTRAVENOUS PRN
Status: DISCONTINUED | OUTPATIENT
Start: 2021-05-17 | End: 2021-05-17 | Stop reason: SDUPTHER

## 2021-05-17 RX ORDER — LIDOCAINE HYDROCHLORIDE 20 MG/ML
SOLUTION OROPHARYNGEAL PRN
Status: DISCONTINUED | OUTPATIENT
Start: 2021-05-17 | End: 2021-05-17 | Stop reason: HOSPADM

## 2021-05-17 RX ADMIN — PROPOFOL 20 MG: 10 INJECTION, EMULSION INTRAVENOUS at 13:25

## 2021-05-17 RX ADMIN — BICTEGRAVIR SODIUM, EMTRICITABINE, AND TENOFOVIR ALAFENAMIDE FUMARATE 1 TABLET: 50; 200; 25 TABLET ORAL at 15:42

## 2021-05-17 RX ADMIN — SULFAMETHOXAZOLE AND TRIMETHOPRIM 334.4 MG: 80; 16 INJECTION, SOLUTION, CONCENTRATE INTRAVENOUS at 00:19

## 2021-05-17 RX ADMIN — PROPOFOL 60 MG: 10 INJECTION, EMULSION INTRAVENOUS at 13:21

## 2021-05-17 RX ADMIN — SODIUM CHLORIDE: 9 INJECTION, SOLUTION INTRAVENOUS at 12:40

## 2021-05-17 RX ADMIN — VALGANCICLOVIR 900 MG: 450 TABLET ORAL at 22:18

## 2021-05-17 RX ADMIN — SULFAMETHOXAZOLE AND TRIMETHOPRIM 334.4 MG: 80; 16 INJECTION, SOLUTION, CONCENTRATE INTRAVENOUS at 08:40

## 2021-05-17 RX ADMIN — Medication 0.2 MG: at 13:21

## 2021-05-17 RX ADMIN — PROPOFOL 20 MG: 10 INJECTION, EMULSION INTRAVENOUS at 13:27

## 2021-05-17 RX ADMIN — PROPOFOL 20 MG: 10 INJECTION, EMULSION INTRAVENOUS at 13:29

## 2021-05-17 RX ADMIN — FLUCONAZOLE 200 MG: 200 INJECTION, SOLUTION INTRAVENOUS at 17:10

## 2021-05-17 RX ADMIN — SULFAMETHOXAZOLE AND TRIMETHOPRIM 334.4 MG: 80; 16 INJECTION, SOLUTION, CONCENTRATE INTRAVENOUS at 22:12

## 2021-05-17 RX ADMIN — LIDOCAINE HYDROCHLORIDE 20 MG: 10 INJECTION, SOLUTION EPIDURAL; INFILTRATION; INTRACAUDAL; PERINEURAL at 13:21

## 2021-05-17 RX ADMIN — SODIUM CHLORIDE: 9 INJECTION, SOLUTION INTRAVENOUS at 22:09

## 2021-05-17 RX ADMIN — LIDOCAINE HYDROCHLORIDE: 40 INJECTION, SOLUTION RETROBULBAR; TOPICAL at 12:18

## 2021-05-17 RX ADMIN — SODIUM CHLORIDE: 9 INJECTION, SOLUTION INTRAVENOUS at 13:17

## 2021-05-17 RX ADMIN — BENZONATATE 100 MG: 100 CAPSULE ORAL at 22:18

## 2021-05-17 RX ADMIN — BENZONATATE 100 MG: 100 CAPSULE ORAL at 15:42

## 2021-05-17 RX ADMIN — MULTIPLE VITAMINS W/ MINERALS TAB 1 TABLET: TAB at 15:42

## 2021-05-17 RX ADMIN — ALBUTEROL SULFATE 2.5 MG: 2.5 SOLUTION RESPIRATORY (INHALATION) at 12:18

## 2021-05-17 RX ADMIN — VALGANCICLOVIR 900 MG: 450 TABLET ORAL at 15:40

## 2021-05-17 RX ADMIN — PROPOFOL 20 MG: 10 INJECTION, EMULSION INTRAVENOUS at 13:24

## 2021-05-17 RX ADMIN — PENICILLIN G BENZATHINE 2.4 MILLION UNITS: 2400000 INJECTION, SUSPENSION INTRAMUSCULAR at 11:31

## 2021-05-17 RX ADMIN — CEFEPIME 2000 MG: 2 INJECTION, POWDER, FOR SOLUTION INTRAVENOUS at 02:19

## 2021-05-17 RX ADMIN — MIDAZOLAM 2 MG: 1 INJECTION INTRAMUSCULAR; INTRAVENOUS at 13:16

## 2021-05-17 RX ADMIN — PROPOFOL 20 MG: 10 INJECTION, EMULSION INTRAVENOUS at 13:26

## 2021-05-17 RX ADMIN — PROPOFOL 30 MG: 10 INJECTION, EMULSION INTRAVENOUS at 13:23

## 2021-05-17 ASSESSMENT — PULMONARY FUNCTION TESTS
PIF_VALUE: 1
PIF_VALUE: 0
PIF_VALUE: 1

## 2021-05-17 ASSESSMENT — ENCOUNTER SYMPTOMS
COUGH: 0
CONSTIPATION: 0
SHORTNESS OF BREATH: 0
ABDOMINAL PAIN: 0
VOMITING: 0
DIARRHEA: 0
NAUSEA: 0

## 2021-05-17 ASSESSMENT — PAIN SCALES - GENERAL
PAINLEVEL_OUTOF10: 0

## 2021-05-17 ASSESSMENT — VISUAL ACUITY: OU: 1

## 2021-05-17 ASSESSMENT — LIFESTYLE VARIABLES: SMOKING_STATUS: 1

## 2021-05-17 NOTE — ANESTHESIA POSTPROCEDURE EVALUATION
Department of Anesthesiology  Postprocedure Note    Patient: Piedad Shaikh  MRN: [id number]  YOB: 1980  Date of evaluation: 5/17/2021  Time:  3:39 PM     Procedure Summary     Date: 05/17/21 Room / Location: [facility] 01 / [facility]    Anesthesia Start: 9716 Anesthesia Stop: 1081    Procedure: BRONCHOSCOPY (N/A Bronchus) Diagnosis: (PNEUMONIA, INFILTRATES)    Surgeons: Em Lynn MD Responsible Provider: Saritha Armstrong MD    Anesthesia Type: MAC ASA Status: 3          Anesthesia Type: MAC    Lion Phase I: Lion Score: 9    Lion Phase II:      Last vitals: Reviewed and per EMR flowsheets.        Anesthesia Post Evaluation    Comments: POST- ANESTHESIA EVALUATION       Pt Name: Piedad Shaikh  MRN: [id number]  [place]: 1980  Date of evaluation: 5/17/2021  Time:  3:40 PM      BP 94/67   Pulse 93   Temp 98.4 °F (36.9 °C) (Oral)   Resp 20   Ht 5' 11\" (1.803 m)   Wt 125 lb (56.7 kg)   SpO2 91%   BMI 17.43 kg/m²      Consciousness Level  Awake  Cardiopulmonary Status  Stable  Pain Adequately Treated YES  Nausea / Vomiting  NO  Adequate Hydration  YES  Anesthesia Related Complications NONE      Electronically signed by Saritha Armstrong MD on 5/17/2021 at 3:40 PM

## 2021-05-17 NOTE — PLAN OF CARE
Problem: Falls - Risk of:  Goal: Will remain free from falls  Description: Will remain free from falls  5/17/2021 1635 by Maribell Polo RN  Outcome: Ongoing     Problem: Falls - Risk of:  Goal: Absence of physical injury  Description: Absence of physical injury  5/17/2021 1635 by Maribell Polo RN  Outcome: Ongoing     Problem: Patient Goal of the Day:  Goal: Patient Daily Goal Reviewed with:  5/17/2021 1635 by Maribell Polo RN  Outcome: Ongoing     Problem: Patient Goal of the Day:  Goal: Short Term/Patient Specific Goal:  5/17/2021 1635 by Maribell Polo RN  Outcome: Ongoing     Problem: Patient Goal of the Day:  Goal: Patient's reason for admission:  5/17/2021 1635 by Maribell Polo RN  Outcome: Ongoing     Problem: Infection:  Goal: Will remain free from infection  Description: Will remain free from infection  5/17/2021 1635 by Maribell Polo RN  Outcome: Ongoing     Problem: Safety:  Goal: Free from accidental physical injury  Description: Free from accidental physical injury  5/17/2021 1635 by Maribell Polo RN  Outcome: Ongoing     Problem: Safety:  Goal: Free from intentional harm  Description: Free from intentional harm  5/17/2021 1635 by Maribell Polo RN  Outcome: Ongoing     Problem: Skin Integrity:  Goal: Skin integrity will stabilize  Description: Skin integrity will stabilize  5/17/2021 1635 by Maribell Polo RN  Outcome: Ongoing     Problem: Discharge Planning:  Goal: Patients continuum of care needs are met  Description: Patients continuum of care needs are met  5/17/2021 1635 by Maribell Polo RN  Outcome: Ongoing     Problem: Nutrition  Goal: Optimal nutrition therapy  Description: Nutrition Problem #1: Severe malnutrition  Intervention: Food and/or Nutrient Delivery: Continue Current Diet, Start Oral Nutrition Supplement  Nutritional Goals: po intake greater than 50%     5/17/2021 1635 by Maribell Polo RN  Outcome: Ongoing     Problem: Skin Integrity:  Goal: Will show no infection signs and symptoms  Description: Will show no infection signs and symptoms  5/17/2021 1635 by Kathia Hernandez RN  Outcome: Ongoing     Problem: Skin Integrity:  Goal: Absence of new skin breakdown  Description: Absence of new skin breakdown  5/17/2021 1635 by Kathia Hernandez RN  Outcome: Ongoing

## 2021-05-17 NOTE — ANESTHESIA PRE PROCEDURE
Department of Anesthesiology  Preprocedure Note       Name:  Prashant Sandhu   Age:  45 y.o.  :  1980                                          MRN:  [id number]         Date:  2021      Surgeon: Lidia Florentino):  Salvador Nava MD    Procedure: Procedure(s):  BRONCHOSCOPY    Medications prior to admission:   Prior to Admission medications    Not on File       Current medications:    Current Facility-Administered Medications   Medication Dose Route Frequency Provider Last Rate Last Admin    bictegravir-emtricitab-tenofovir alafenamide (BIKTARVY) -25 MG per tablet 1 tablet  1 tablet Oral Daily Jorge Luis Lynn MD   1 tablet at 21 1237    loperamide (IMODIUM) capsule 2 mg  2 mg Oral 4x Daily PRN Yola Greer MD        metoprolol (LOPRESSOR) injection 5 mg  5 mg Intravenous Q6H PRN Severiano Expose, MD   5 mg at 21 1824    valGANciclovir (VALCYTE) tablet 900 mg  900 mg Oral BID Kerry Gillette MD   900 mg at 21 2223    perflutren lipid microspheres (DEFINITY) injection 2.2 mg  2 mL Intravenous ONCE PRN Jayy Jeffrey MD        fluconazole (DIFLUCAN) 200 mg IVPB  200 mg Intravenous Q24H Jorge Luis Lynn MD   Stopped at 21 1731    sulfamethoxazole-trimethoprim (BACTRIM) 334.4 mg in dextrose 5 % 500 mL IVPB  5 mg/kg Intravenous Daphnie Adams MD   Stopped at 21 0158    sodium chloride flush 0.9 % injection 5-40 mL  5-40 mL Intravenous 2 times per day Jayy Jeffrey MD   10 mL at 05/15/21 2246    sodium chloride flush 0.9 % injection 10 mL  10 mL Intravenous PRN Jayy Jeffrey MD        0.9 % sodium chloride infusion  25 mL Intravenous PRN Jayy Jeffrey MD        potassium chloride (KLOR-CON M) extended release tablet 40 mEq  40 mEq Oral PRN Jayy Jeffrey MD   40 mEq at 21 1105    Or    potassium bicarb-citric acid (EFFER-K) effervescent tablet 40 mEq  40 mEq Oral PRN Jayy Jeffrey MD        Or    potassium chloride 10 mEq/100 mL IVPB (Peripheral Line)  10 mEq Intravenous PRN Jayy MD Wojciech        magnesium sulfate 1000 mg in dextrose 5% 100 mL IVPB  1,000 mg Intravenous PRN Gena Hollis MD        [Held by provider] enoxaparin (LOVENOX) injection 40 mg  40 mg Subcutaneous Daily NACHO Doran - CNP   40 mg at 05/16/21 0943    promethazine (PHENERGAN) tablet 12.5 mg  12.5 mg Oral Q6H PRN Jayy Jeffrey MD        Or    ondansetron (ZOFRAN) injection 4 mg  4 mg Intravenous Q6H PRN Jayy Jeffrey MD        polyethylene glycol (GLYCOLAX) packet 17 g  17 g Oral Daily PRN Jayy Jeffrey MD        acetaminophen (TYLENOL) tablet 650 mg  650 mg Oral Q6H PRN Jayy Jeffrey MD   650 mg at 05/16/21 1830    Or    acetaminophen (TYLENOL) suppository 650 mg  650 mg Rectal Q6H PRN Jayy Jeffrey MD        benzonatate (TESSALON) capsule 100 mg  100 mg Oral TID PRN Jayy Jeffrey MD        0.9 % sodium chloride infusion   Intravenous Continuous Jayy Jeffrey MD 75 mL/hr at 05/16/21 1451 New Bag at 05/16/21 1451    nicotine (NICODERM CQ) 21 MG/24HR 1 patch  1 patch Transdermal Daily Jayy Jeffrey MD        therapeutic multivitamin-minerals 1 tablet  1 tablet Oral Daily Jayy Jeffrey MD   1 tablet at 05/16/21 0943    ondansetron (ZOFRAN) injection 4 mg  4 mg Intravenous Once Jayy Jeffrey MD        sodium chloride flush 0.9 % injection 10 mL  10 mL Intravenous PRN Jayy Jeffrey MD   10 mL at 05/12/21 1349    cefepime (MAXIPIME) 2000 mg IVPB minibag  2,000 mg Intravenous Q12H Gena Hollis MD   Stopped at 05/17/21 0320    0.9 % sodium chloride infusion  25 mL Intravenous PRN Jayy Jeffrey MD           Allergies:     Allergies   Allergen Reactions    Bee Venom        Problem List:    Patient Active Problem List   Diagnosis Code    Pneumonia due to organism J18.9    Tobacco use Z72.0    Weight loss R63.4    Dehydration E86.0    Hyponatremia E87.1    Anemia D64.9    Elevated LFTs R79.89    Constipation K59.00    Symptomatic HIV infection ([address] B20    Candida esophagitis (HCC) B37.81    Acute cytomegalovirus infection ([facility] B25.9    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.90       Past Medical History:        Diagnosis Date    HIV (human immunodeficiency virus infection) ([facility]     Hydronephrosis 2015    Pt unsure of dx time but believes about 6 yrs ago    Kidney stone        Past Surgical History:        Procedure Laterality Date    APPENDECTOMY  2005    Pt believes when he was about 25yrs     COLONOSCOPY N/A 5/13/2021    COLONOSCOPY DIAGNOSTIC performed by Michelle Pena MD at [facility] 5/13/2021    EGD BIOPSY performed by Michelle Pena MD at [facility] History:    Social History     Tobacco Use    Smoking status: Current Every Day Smoker     Packs/day: 0.50     Types: Cigarettes    Smokeless tobacco: Never Used    Tobacco comment: HAsn't craved for past 5 months   Substance Use Topics    Alcohol use: Not Currently                                Ready to quit: Yes  Counseling given: Not Answered  Comment: HAsn't craved for past 5 months      Vital Signs (Current):   Vitals:    05/16/21 2015 05/16/21 2215 05/17/21 0015 05/17/21 0650   BP: 94/71 94/70 90/70 99/72   Pulse: 87 71 72 78   Resp: 18 18 16 20   Temp: 98.2 °F (36.8 °C) 97.2 °F (36.2 °C) 97 °F (36.1 °C) 98.1 °F (36.7 °C)   TempSrc: Oral Oral Oral Oral   SpO2: 97% 97% 98% 95%   Weight:       Height:                                                  BP Readings from Last 3 Encounters:   05/17/21 99/72   05/13/21 (!) 74/36       NPO Status: Time of last liquid consumption: 2200                        Time of last solid consumption: 1200                        Date of last liquid consumption: 05/12/21                        Date of last solid food consumption: 05/12/21    BMI:   Wt Readings from Last 3 Encounters:   05/16/21 167 lb 8.8 oz (76 kg)     Body mass index is 23.37 kg/m².     CBC:   Lab Results   Component Value Date    WBC 8.8 05/17/2021    RBC 3.06 05/17/2021    HGB 9.2 05/17/2021    HCT 27.4 05/17/2021 MCV 89.5 05/17/2021    RDW 15.7 05/17/2021     05/17/2021       CMP:   Lab Results   Component Value Date     05/17/2021    K 3.7 05/17/2021     05/17/2021    CO2 22 05/17/2021    BUN 9 05/17/2021    CREATININE 0.59 05/17/2021    GFRAA >60 05/17/2021    LABGLOM >60 05/17/2021    GLUCOSE 70 05/17/2021    PROT 5.4 05/13/2021    CALCIUM 7.4 05/17/2021    BILITOT 0.85 05/13/2021    ALKPHOS 88 05/13/2021    AST 71 05/13/2021    ALT 40 05/13/2021       POC Tests: No results for input(s): POCGLU, POCNA, POCK, POCCL, POCBUN, POCHEMO, POCHCT in the last 72 hours.     Coags:   Lab Results   Component Value Date    PROTIME 14.6 05/11/2021    INR 1.1 05/11/2021    APTT 41.4 05/11/2021       HCG (If Applicable): No results found for: PREGTESTUR, PREGSERUM, HCG, HCGQUANT     ABGs:   Lab Results   Component Value Date    PHART 7.493 05/13/2021    PO2ART 85.3 05/13/2021    PRD4GIA 29.5 05/13/2021    MWQ6DFK 22.6 05/13/2021    S0UFNHUP 95.8 05/13/2021        Type & Screen (If Applicable):  No results found for: LABABO, LABRH    Drug/Infectious Status (If Applicable):  Lab Results   Component Value Date    HEPCAB NONREACTIVE 05/12/2021       COVID-19 Screening (If Applicable):   Lab Results   Component Value Date    COVID19 Not Detected 05/12/2021           Anesthesia Evaluation  Patient summary reviewed and Nursing notes reviewed no history of anesthetic complications:   Airway: Mallampati: II  TM distance: >3 FB   Neck ROM: full  Mouth opening: > = 3 FB Dental: normal exam         Pulmonary:normal exam  breath sounds clear to auscultation  (+) pneumonia:  current smoker                          ROS comment: Cough +  innuumerable bilateral lung nodules, cavitary and noncavitary   Cardiovascular:    (+) hypertension:,         Rhythm: regular  Rate: normal           Beta Blocker:  Dose within 24 Hrs         Neuro/Psych:   Negative Neuro/Psych ROS              GI/Hepatic/Renal:   (+) liver disease (Elevated LFTs):, renal disease: kidney stones,          ROS comment: Candida esophagitis . Endo/Other:    (+) blood dyscrasia: anemia:., electrolyte abnormalities (hyponatremia), .                  ROS comment: Symptomatic HIV infection     In isolation - for r/o TB    malnutrition Abdominal:           Vascular: negative vascular ROS. Anesthesia Plan      MAC     ASA 3       Induction: intravenous. MIPS: Prophylactic antiemetics administered. Anesthetic plan and risks discussed with patient. Plan discussed with CRNA.                   Kelli Muse MD   5/17/2021

## 2021-05-17 NOTE — PROGRESS NOTES
normal.  Regular rate and rhythm   Abdomen - Soft, nontender, nondistended, no masses or organomegaly  Neurologic - There are no focal motor or sensory deficits  Skin - No bruising or bleeding  Extremities - No clubbing, cyanosis, edema    MEDS      albuterol        [MAR Hold] bictegravir-emtricitab-tenofovir alafenamide  1 tablet Oral Daily    [MAR Hold] valGANciclovir  900 mg Oral BID    [MAR Hold] fluconazole  200 mg Intravenous Q24H    [MAR Hold] sulfamethoxazole-trimethoprim (BACTRIM) IVPB  5 mg/kg Intravenous Q8H    [MAR Hold] sodium chloride flush  5-40 mL Intravenous 2 times per day    [Held by provider] enoxaparin  40 mg Subcutaneous Daily    [MAR Hold] nicotine  1 patch Transdermal Daily    [MAR Hold] therapeutic multivitamin-minerals  1 tablet Oral Daily    [MAR Hold] ondansetron  4 mg Intravenous Once      [MAR Hold] sodium chloride      [MAR Hold] sodium chloride 75 mL/hr at 05/17/21 1240    [MAR Hold] sodium chloride       lidocaine viscous hcl, [MAR Hold] loperamide, [MAR Hold] metoprolol, [MAR Hold] perflutren lipid microspheres, [MAR Hold] sodium chloride flush, [MAR Hold] sodium chloride, [MAR Hold] potassium chloride **OR** [MAR Hold] potassium alternative oral replacement **OR** [MAR Hold] potassium chloride, [MAR Hold] magnesium sulfate, [MAR Hold] promethazine **OR** [MAR Hold] ondansetron, [MAR Hold] polyethylene glycol, [MAR Hold] acetaminophen **OR** [MAR Hold] acetaminophen, [MAR Hold] benzonatate, [MAR Hold] sodium chloride flush, [MAR Hold] sodium chloride    LABS   CBC   Recent Labs     05/17/21  0639   WBC 8.8   HGB 9.2*   HCT 27.4*   MCV 89.5   *     BMP:   Lab Results   Component Value Date     05/17/2021    K 3.7 05/17/2021     05/17/2021    CO2 22 05/17/2021    BUN 9 05/17/2021    LABALBU 2.1 05/13/2021    CREATININE 0.59 05/17/2021    CALCIUM 7.4 05/17/2021    GFRAA >60 05/17/2021    LABGLOM >60 05/17/2021     ABGs:  Lab Results   Component Value

## 2021-05-17 NOTE — PROGRESS NOTES
Comprehensive Nutrition Assessment    Type and Reason for Visit:  Reassess    Nutrition Recommendations/Plan: Continue current diet. Provide Ensure Enlive with meals. Nutrition Assessment:  Pt in airborne precautions for possible TB, & will remain until AFB Smear is neg. PO intake appears to average 26-50% of meals. Will continue current diet and resume oral nutrition supplements. Malnutrition Assessment:  Malnutrition Status:  Severe malnutrition    Context:  Acute Illness     Findings of the 6 clinical characteristics of malnutrition:  Energy Intake:  7 - 50% or less of estimated energy requirements for 5 or more days  Weight Loss:  Unable to assess (wt history is stated)     Body Fat Loss:   (Severe body fat loss) Triceps   Muscle Mass Loss:   (severe muscle mass loss) Calf (gastrocnemius)  Fluid Accumulation:  No significant fluid accumulation     Strength:  Not Performed    Estimated Daily Nutrient Needs:  Energy (kcal):  30 kcal/kg= 2000 kcal; Weight Used for Energy Requirements:  Admission (67 kg)     Protein (g):  1.5g/kg= 115-120 g protein; Weight Used for Protein Requirements:  Ideal          Nutrition Related Findings:  Diarrhea, loss of appetite. No edema. Na: 132 (5/17). Labs and meds reviewed. Candida Esophagitis . Wounds:  None       Current Nutrition Therapies:    DIET GENERAL;     Anthropometric Measures:  · Height: 5' 11\" (180.3 cm)  · Current Body Weight: 125 lb (56.7 kg) (wt discrepancies noted)   · Admission Body Weight: 147 lb (66.7 kg)    · Usual Body Weight:  (143-150# stated)     · Ideal Body Weight: 172 lbs  · BMI: 17.4  · BMI Categories: Underweight (BMI less than 22) age over 72       Nutrition Diagnosis:   · Severe malnutrition related to inadequate protein-energy intake as evidenced by severe loss of subcutaneous fat, severe muscle loss, poor intake prior to admission    Nutrition Interventions:   Food and/or Nutrient Delivery:  Continue Current Diet, Start Oral Nutrition Supplement  Nutrition Education/Counseling:  No recommendation at this time   Coordination of Nutrition Care:  Continue to monitor while inpatient    Goals:  po intake greater than 50%       Nutrition Monitoring and Evaluation:   Behavioral-Environmental Outcomes:  None Identified   Food/Nutrient Intake Outcomes:  Food and Nutrient Intake, Supplement Intake  Physical Signs/Symptoms Outcomes:  Biochemical Data, GI Status, Skin, Weight, Fluid Status or Edema     Discharge Planning:    Continue current diet, Continue Oral Nutrition Supplement     Some areas of assessment may be incomplete due to standard COVID-19 Precautions. Shawna Sears R.D., L.D.   Phone: [phone]

## 2021-05-17 NOTE — PLAN OF CARE
Problem: Falls - Risk of:  Goal: Will remain free from falls  Description: Will remain free from falls  5/17/2021 0418 by Korin Bashir RN  Outcome: Ongoing  5/16/2021 1506 by Aj Owens RN  Outcome: Ongoing  Note: No falls this shift. Call light within reach and siderails x2. Bed in lowest position. Patient safety maintained. Goal: Absence of physical injury  Description: Absence of physical injury  5/17/2021 0418 by Korin Bashir RN  Outcome: Ongoing  5/16/2021 1506 by Aj Owens RN  Outcome: Ongoing  Note: No falls this shift. Call light within reach and siderails x2. Bed in lowest position. Patient safety maintained. Problem: Patient Goal of the Day:  Goal: Patient Daily Goal Reviewed with:  5/17/2021 0418 by Korin Bashir RN  Outcome: Ongoing  5/16/2021 1506 by Aj Owens RN  Outcome: Ongoing  Goal: Short Term/Patient Specific Goal:  5/17/2021 0418 by Korin Bashir RN  Outcome: Ongoing  5/16/2021 1506 by Aj Owens RN  Outcome: Ongoing  Goal: Patient's reason for admission:  5/17/2021 0418 by Korin Bashir RN  Outcome: Ongoing  5/16/2021 1506 by Aj Owens RN  Outcome: Ongoing     Problem: Infection:  Goal: Will remain free from infection  Description: Will remain free from infection  5/17/2021 0418 by Korin Bashir RN  Outcome: Ongoing  5/16/2021 1506 by Aj Owens RN  Outcome: Ongoing     Problem: Safety:  Goal: Free from accidental physical injury  Description: Free from accidental physical injury  5/17/2021 0418 by Korin Bashir RN  Outcome: Ongoing  5/16/2021 1506 by Aj Owens RN  Outcome: Ongoing  Note: No falls this shift. Call light within reach and siderails x2. Bed in lowest position. Patient safety maintained.     Goal: Free from intentional harm  Description: Free from intentional harm  5/17/2021 0418 by Korin Bashir RN  Outcome: Ongoing  5/16/2021 1506 by Aj Owens RN  Outcome: Ongoing     Problem: Skin Integrity:  Goal: Skin integrity will stabilize  Description: Skin integrity will stabilize  5/17/2021 0418 by Natalia Ace RN  Outcome: Ongoing  5/16/2021 1506 by Stacy Yañez RN  Outcome: Ongoing  Note: Skin assessment as charted. No new areas of breakdown. Problem: Discharge Planning:  Goal: Patients continuum of care needs are met  Description: Patients continuum of care needs are met  5/17/2021 0418 by Natalia Ace RN  Outcome: Ongoing  5/16/2021 1506 by Stacy Yañez RN  Outcome: Ongoing  Note: Case management is following for further discharge needs      Problem: Nutrition  Goal: Optimal nutrition therapy  Description: Nutrition Problem #1: Severe malnutrition  Intervention: Food and/or Nutrient Delivery: Continue Current Diet, Start Oral Nutrition Supplement  Nutritional Goals: po intake greater than 50%     5/17/2021 0418 by Natalia Ace RN  Outcome: Ongoing  5/16/2021 1506 by Stacy Yañez RN  Outcome: Ongoing     Problem: Skin Integrity:  Goal: Will show no infection signs and symptoms  Description: Will show no infection signs and symptoms  5/17/2021 0418 by Natalia Ace RN  Outcome: Ongoing  5/16/2021 1506 by Stacy Yañez RN  Outcome: Ongoing  Goal: Absence of new skin breakdown  Description: Absence of new skin breakdown  5/17/2021 0418 by Natalia Ace RN  Outcome: Ongoing  5/16/2021 1506 by Stacy Yañez RN  Outcome: Ongoing  Note: Skin assessment as charted. No new areas of breakdown.

## 2021-05-17 NOTE — PROGRESS NOTES
done on 05/13 showing candida esophagitis, started on IV Diflucan. Vancomycin discontinued due to negative MRSA swab. Patient started on IV Bactrim on 05/13.     Review of Systems:     Review of Systems   Constitutional: Positive for fatigue. Negative for chills, diaphoresis and fever. HENT: Negative for congestion. Eyes: Negative for visual disturbance. Respiratory: Negative for cough and shortness of breath. Cardiovascular: Negative for chest pain and palpitations. Gastrointestinal: Negative for abdominal pain, constipation, diarrhea, nausea and vomiting. Endocrine: Negative for polyuria. Genitourinary: Negative for dysuria. Skin: Negative for rash. Neurological: Negative for weakness, light-headedness and numbness. All other systems reviewed and are negative. Medications: Allergies:     Allergies   Allergen Reactions    Bee Venom        Current Meds:   Scheduled Meds:    Penicillin G Benzathine  2.4 Million Units Intramuscular Once    bictegravir-emtricitab-tenofovir alafenamide  1 tablet Oral Daily    valGANciclovir  900 mg Oral BID    fluconazole  200 mg Intravenous Q24H    sulfamethoxazole-trimethoprim (BACTRIM) IVPB  5 mg/kg Intravenous Q8H    sodium chloride flush  5-40 mL Intravenous 2 times per day    [Held by provider] enoxaparin  40 mg Subcutaneous Daily    nicotine  1 patch Transdermal Daily    therapeutic multivitamin-minerals  1 tablet Oral Daily    ondansetron  4 mg Intravenous Once     Continuous Infusions:    sodium chloride      sodium chloride 75 mL/hr at 05/16/21 1451    sodium chloride       PRN Meds: loperamide, metoprolol, perflutren lipid microspheres, sodium chloride flush, sodium chloride, potassium chloride **OR** potassium alternative oral replacement **OR** potassium chloride, magnesium sulfate, promethazine **OR** ondansetron, polyethylene glycol, acetaminophen **OR** acetaminophen, benzonatate, sodium chloride flush, sodium chloride    Data: Lateral Wall E' velocity:0.17 m/s    XR CHEST PORTABLE    Result Date: 5/11/2021  EXAMINATION: ONE XRAY VIEW OF THE CHEST 5/11/2021 11:27 pm COMPARISON: None. HISTORY: ORDERING SYSTEM PROVIDED HISTORY: Cough, SOB TECHNOLOGIST PROVIDED HISTORY: Cough, SOB Reason for Exam: Cough, SOB Acuity: Acute Type of Exam: Initial Additional signs and symptoms: Cough, SOB Relevant Medical/Surgical History: Cough, SOB FINDINGS: Lungs are hyperaerated. There are increased reticulonodular interstitial markings throughout. Mediastinum normal.  Bony thorax intact. Small airways disease and reticulonodular interstitial disease. Differential considerations include infectious and inflammatory interstitial lung disease. CT CHEST ABDOMEN PELVIS W CONTRAST    Result Date: 5/12/2021  EXAMINATION: CT OF THE CHEST, ABDOMEN, AND PELVIS WITH CONTRAST 5/12/2021 1:49 pm TECHNIQUE: CT of the chest, abdomen and pelvis was performed with the administration of intravenous contrast. Multiplanar reformatted images are provided for review. Dose modulation, iterative reconstruction, and/or weight based adjustment of the mA/kV was utilized to reduce the radiation dose to as low as reasonably achievable. COMPARISON: No priors HISTORY: ORDERING SYSTEM PROVIDED HISTORY: cachexia TECHNOLOGIST PROVIDED HISTORY: cachexia rule out lymphoma,legionella pneumonia? Reason for Exam: cachexia, rule out lymphoma,legionella pneumonia? Acuity: Unknown Type of Exam: Unknown FINDINGS: Chest: Mediastinum: The cardiac size is normal. 1.7 cm low right paratracheal lymph node. Smaller high right paratracheal lymph nodes. Calcified subcarinal lymph nodes. .  The thyroid gland shows no significant abnormalities. The esophagus shows no significant abnormalities. Lungs/pleura: Innumerable round rim and tree-in-bud nodules/micro nodules some of which demonstrate cavitation.   There is a dominant 1.8 x 3.1 cm consolidation anterior segment right upper lobe showing some air bronchograms. Findings most supportive of infectious etiology including pneumonias and septic emboli in appropriate clinical setting. Trace right pleural effusion. Minor subsegmental atelectasis at the posterior sulci. No pneumothorax. Soft Tissues/Bones: No acute abnormality of the bones. The superficial soft tissues show no significant abnormalities. Abdomen/Pelvis: Organs: Liver shows no focal lesions. Spleen demonstrates multiple scattered hypodensities with ill-defined margins to about a cm in size. Differential would include microabscesses and lymphoma. Some metastatic disease can have similar appearance. Kidneys, adrenal glands, pancreas and gallbladder unremarkable. GI/Bowel: No acute process. No bowel obstruction. No focal lesions. Pelvis: Urinary bladder grossly normal.  No suspicious pelvic mass. Peritoneum/Retroperitoneum: No free fluid. No retroperitoneal lymphadenopathy. Bones/Soft Tissues: No acute abnormality of the bones. The superficial soft tissues show no significant abnormalities. There are innumerable cavitating and non cavitating mostly subcentimeter pulmonary nodules and tree-in-bud nodules noted diffusely throughout the lungs. Some around them whereas others are centrilobular in distribution. Trace pleural effusion. Differential consideration is pneumonia versus septic emboli. Multiple scattered splenic hypodensities with ill-defined margins. These measure up to a cm in size. Most likely micro abscesses. Differential would include lymphoma and metastatic disease. Physical Examination:        Physical Exam  Vitals reviewed. Constitutional:       General: He is awake. He is not in acute distress. HENT:      Head: Normocephalic. Eyes:      General: Vision grossly intact. Cardiovascular:      Rate and Rhythm: Normal rate. Heart sounds: Normal heart sounds. Pulmonary:      Effort: Pulmonary effort is normal.      Breath sounds: Wheezing present. Abdominal:      General: Abdomen is flat. Bowel sounds are normal.      Palpations: Abdomen is soft. Tenderness: There is no abdominal tenderness. Musculoskeletal:      Right lower leg: No edema. Left lower leg: No edema. Skin:     Comments: Face has a dry erythematous rash   Neurological:      Mental Status: He is alert and easily aroused. Psychiatric:         Behavior: Behavior is cooperative. Assessment:        Primary Problem  Symptomatic HIV infection ([facility] [address]    Active Hospital Problems    Diagnosis Date Noted    CMV retinitis ([facility] [address] [B25.9, H30.90] 05/15/2021    Symptomatic HIV infection ([facility] [address] [B20] 05/13/2021    Candida esophagitis ([facility] [address] [B37.81] 05/13/2021    Acute cytomegalovirus infection ([facility] [address] [B25.9] 05/13/2021    Pneumonia due to organism [J18.9] 05/12/2021    Tobacco use [Z72.0] 05/12/2021    Weight loss [R63.4] 05/12/2021    Dehydration [E86.0] 05/12/2021    Hyponatremia [E87.1] 05/12/2021    Anemia [D64.9]     Elevated LFTs [R79.89]        Plan:        Bilateral Atypical Pneumonia 2/2 Suspected PCP Pneumonia in the Setting of Acute HIV Infection  - CXR on 05/11: Small airway disease and reticulonodular interstitial disease  - CT Chest Abdomen 05/12: Innumerable cavitating and non cavitating mostly sub centimeter pulmonary nodules and tree-in-bud nodules noted diffusely throughout the lungs, some are centrilobular in distribution (differentials include pneumonia or septic emboli). Trace pleural effusion.  Multiple splenic hypo densities with ill defined margins up to 1 cm in size likely micro abscesses (differentials include lymphoma or metastatic disease)  - Blood cultures NGTD  - Legionella urine negative, COVID negative   - Alpha 1 antitrypsin negative, AFP wnl, Ceruloplasmin wnl, Mitochondrial antibodies negative  - EBV IgG positive  - CMV IgG and IgM positive  - Pro Calcitonin 18.94  - HIV Ag/Ab positive  - CD4 count of 25, HIV RNA viral load PENDING  - Mycoplasma IgM negative   - Will get induced sputum for pneumocystis stain and respiratory culture, pending   - Will need Bronchoscopy for BAL, scheduled for 05/17/21  - ID on board              - Needs bronchoscopy              - 5 days of Zithromax and Cefepime completed               - Diflucan day 5              - IV Bactrim Day 5              - Echocardiogram wnl, EF of 50-55%              - Hepatitis panel negative              - GC and Chlamydia negative              - C diff negative              - Started ([facility]) bictegravir-emtricitab- tenofovir alafenamide   - Pulmonology on board              - Await blood cultures to rule out septic emboli              - Bronchoscopy on 05/17/2021     Possible Miliary Tuberculosis/Malignancy/Septic Emboli on Imaging  - CT Chest Abdomen 05/12: Innumerable cavitating and non cavitating mostly sub centimeter pulmonary nodules and tree-in-bud nodules noted diffusely throughout the lungs, some are centrilobular in distribution (differentials include pneumonia or septic emboli). Trace pleural effusion. Multiple splenic hypo densities with ill defined margins up to 1 cm in size likely micro abscesses (differentials include lymphoma or metastatic disease)  - Cavitations in chest are miliary-like  - Echocardiogram done 05/13 with LVEF 50-55%.  No vegetations  - Blood cultures NGTD  - Will discuss with ID   - TB Quantiferon indeterminate, recommend repeat testing in 1 month     CMV Retinitis  - Positive IgG and IgM  - CMV PCR pending  - S/P one dose of IV Ganciclovir 5 mg/kg  - PO Valganciclovir 900 mg BID day 3  - Ophthalmology on board              - Areas of granular atrophic changes in sectoral pattern superonasally              - Scattered cotton wool spots bilaterally              - Would recommend antiviral CMV treatment              - Follow up with Dr. Rashad Clarke at [address] after DC     Candida Esophagitis   - EGD 05/13: Significant esophagitis involving entire esophagus with white thick cottage cheese type discharge consistent with candida esophagitis  - IV Fluconazole Day 5     Syphilis Infection  - Positive IgG  - VDRL positive  - ID on board appreciate recs  - Penicillin G IM 3 doses with first dose on discharge per ID      Hyponatremia ,improving   - Sodium today 132     DVT Prophylaxis: Lovenox 40 mg SC daily   GI Prophylaxis: Aleah Beverly MD  5/17/2021  9:47 AM     I have discussed the care of Lissette Alanis , including pertinent history and exam findings,    today with the resident. I have seen and examined the patient and the key elements of all parts of the encounter have been performed by me . I agree with the assessment, plan and orders as documented by the resident. Principal Problem:    AIDS (acquired immune deficiency syndrome) ([facility] [address]  Active Problems:    Pneumonia due to organism    Tobacco use    Weight loss    Dehydration    Hyponatremia    Anemia    Elevated LFTs    Candida esophagitis (HCC)    Acute cytomegalovirus infection (HCC)    CMV retinitis ([facility] [address]    Multiple pulmonary nodules  Resolved Problems:    * No resolved hospital problems. *        Overall  course ;                                   are improving over time.         Patient underwent Bronchoscopy   Feeling better   Will work on [address] [place] [address]   In Negative isolation           Electronically signed by Liu Gallegos MD   .

## 2021-05-17 NOTE — PROGRESS NOTES
[facility] -   Infectious diseases evaluation  admission date 5/11/2021    reason for consultation:   Pneumonia    Impression :   Current:  · HIV infection/AIDS  · Pneumonia ,cavitary and noncavitary lung nodules(differential diagnoses includes bacterial, viral, fungal, PCP and Mycobacterial infection)  · Multiple splenic hypodensities   · Esophageal candidiasis   · Suspected CMV retinitis  · Thrush  · Latent syphilis   · Intermediate QuantiFERON TB test  · Thrombocytopenia  · Hyponatremia  · Smoking  · History of kidney stones        Recommendations   · Bronchoscopy planned later today  ·  IV Zithromax discontinued 5/16/2021  · Continue IV  cefepime for another day  · Continue Diflucan  · Continue IV Bactrim  · Valganciclovir p.o. 900 twice a day  · Biktarvy  · Penicillin G IM 2.4 million weekly for 3 doses, first dose today  · HIV viral load pending  · CD4 count 24  · Echocardiogram negative for vegetations   · Hepatitis panel negative  · CMV PCR pending  · Stool for C. difficile negative  · Urine for Legionella antigen negative  · Urine for GC and Chlamydia DNA negative   · COVID-19 rapid test negative  · Follow blood cultures no growth to date  · Airborne isolation for now, may discontinue if AFB smear negative on bronchoscopy. · HIV genotype pending              History of Present Illness:   Initial history:  Darling Santana is a 45y.o.-year-old male presented to hospital with worsening shortness of breath associated with cough productive white phlegm, generalized weakness and subjective fever for 4 months, gradual onset, continues, moderate in severity, no alleviating or aggravating factors. Patient also had decreased appetite, dizziness, significant weight loss. He was constipated initially then developed diarrhea after stool softeners.   He also had thrush, tried several over-the-counter treatments with no improvement  He is homosexual, had multiple murmur heard. Pulmonary:      Effort: No respiratory distress. Breath sounds: Rhonchi present. No wheezing. Abdominal:      General: There is no distension. Palpations: Abdomen is soft. Tenderness: There is no abdominal tenderness. Musculoskeletal:      Cervical back: Neck supple. No rigidity. Right lower leg: No edema. Left lower leg: No edema. Skin:     General: Skin is dry. Coloration: Skin is pale. Skin is not jaundiced. Neurological:      General: No focal deficit present. Mental Status: He is oriented to person, place, and time.    Psychiatric:         Mood and Affect: Mood normal.         Behavior: Behavior normal.         Past Medical History:     Past Medical History:   Diagnosis Date    HIV (human immunodeficiency virus infection) ([facility] [address]     Hydronephrosis 2015    Pt unsure of dx time but believes about 6 yrs ago    Kidney stone        Past Surgical  History:     Past Surgical History:   Procedure Laterality Date    APPENDECTOMY  2005    Pt believes when he was about 25yrs     COLONOSCOPY N/A 5/13/2021    COLONOSCOPY DIAGNOSTIC performed by Emmanuelle Palumbo MD at [address] [facility] 5/13/2021    EGD BIOPSY performed by Emmanuelle Palumbo MD at [facility]       Medications:      Penicillin G Benzathine  2.4 Million Units Intramuscular Once    bictegravir-emtricitab-tenofovir alafenamide  1 tablet Oral Daily    valGANciclovir  900 mg Oral BID    fluconazole  200 mg Intravenous Q24H    sulfamethoxazole-trimethoprim (BACTRIM) IVPB  5 mg/kg Intravenous Q8H    sodium chloride flush  5-40 mL Intravenous 2 times per day    [Held by provider] enoxaparin  40 mg Subcutaneous Daily    nicotine  1 patch Transdermal Daily    therapeutic multivitamin-minerals  1 tablet Oral Daily    ondansetron  4 mg Intravenous Once       Social History:     Social History     Socioeconomic History    Marital status: Single     Spouse name: Not on file   

## 2021-05-18 VITALS
RESPIRATION RATE: 18 BRPM | SYSTOLIC BLOOD PRESSURE: 107 MMHG | OXYGEN SATURATION: 90 % | TEMPERATURE: 98.2 F | HEIGHT: 71 IN | BODY MASS INDEX: 17.5 KG/M2 | DIASTOLIC BLOOD PRESSURE: 67 MMHG | WEIGHT: 125 LBS | HEART RATE: 94 BPM

## 2021-05-18 LAB
ANION GAP SERPL CALCULATED.3IONS-SCNC: 9 MMOL/L (ref 9–17)
BUN BLDV-MCNC: 9 MG/DL (ref 6–20)
BUN/CREAT BLD: ABNORMAL (ref 9–20)
CALCIUM SERPL-MCNC: 7.3 MG/DL (ref 8.6–10.4)
CHLORIDE BLD-SCNC: 95 MMOL/L (ref 98–107)
CMV DNA QUANTATATIVE INTERPRETATION: DETECTED
CMV QUANT IU/ML: 294 IU/ML
CMV QUANT LOG IU/ML: 2.5 LOG IU/ML
CMV SOURCE: NORMAL
CMVQ COPY/ML: 506 CPY/ML
CO2: 24 MMOL/L (ref 20–31)
CREAT SERPL-MCNC: 0.79 MG/DL (ref 0.7–1.2)
CULTURE: NORMAL
CULTURE: NORMAL
CYTOMEGALOVIRUS QUANT. PCR: 2.7 LOG CPY/ML
DIRECT EXAM: NORMAL
GFR AFRICAN AMERICAN: >60 ML/MIN
GFR NON-AFRICAN AMERICAN: >60 ML/MIN
GFR SERPL CREATININE-BSD FRML MDRD: ABNORMAL ML/MIN/{1.73_M2}
GFR SERPL CREATININE-BSD FRML MDRD: ABNORMAL ML/MIN/{1.73_M2}
GLUCOSE BLD-MCNC: 106 MG/DL (ref 70–99)
HCT VFR BLD CALC: 25 % (ref 41–53)
HEMOGLOBIN: 8.4 G/DL (ref 13.5–17.5)
Lab: NORMAL
MCH RBC QN AUTO: 29.9 PG (ref 26–34)
MCHC RBC AUTO-ENTMCNC: 33.4 G/DL (ref 31–37)
MCV RBC AUTO: 89.3 FL (ref 80–100)
NRBC AUTOMATED: ABNORMAL PER 100 WBC
PATHOLOGIST REVIEW: NORMAL
PDW BLD-RTO: 15.2 % (ref 11.5–14.9)
PLATELET # BLD: 115 K/UL (ref 150–450)
PMV BLD AUTO: 9.5 FL (ref 6–12)
POTASSIUM SERPL-SCNC: 3.7 MMOL/L (ref 3.7–5.3)
RBC # BLD: 2.8 M/UL (ref 4.5–5.9)
SODIUM BLD-SCNC: 128 MMOL/L (ref 135–144)
SPECIMEN DESCRIPTION: NORMAL
SURGICAL PATHOLOGY REPORT: NORMAL
WBC # BLD: 8.1 K/UL (ref 3.5–11)

## 2021-05-18 PROCEDURE — 36415 COLL VENOUS BLD VENIPUNCTURE: CPT

## 2021-05-18 PROCEDURE — 6370000000 HC RX 637 (ALT 250 FOR IP): Performed by: INTERNAL MEDICINE

## 2021-05-18 PROCEDURE — 2500000003 HC RX 250 WO HCPCS: Performed by: INTERNAL MEDICINE

## 2021-05-18 PROCEDURE — 99239 HOSP IP/OBS DSCHRG MGMT >30: CPT | Performed by: INTERNAL MEDICINE

## 2021-05-18 PROCEDURE — 2580000003 HC RX 258: Performed by: INTERNAL MEDICINE

## 2021-05-18 PROCEDURE — 80048 BASIC METABOLIC PNL TOTAL CA: CPT

## 2021-05-18 PROCEDURE — 99233 SBSQ HOSP IP/OBS HIGH 50: CPT | Performed by: INTERNAL MEDICINE

## 2021-05-18 PROCEDURE — 6360000002 HC RX W HCPCS: Performed by: INTERNAL MEDICINE

## 2021-05-18 PROCEDURE — 85027 COMPLETE CBC AUTOMATED: CPT

## 2021-05-18 RX ORDER — VALGANCICLOVIR 450 MG/1
TABLET, FILM COATED ORAL
Qty: 80 TABLET | Refills: 0 | Status: SHIPPED | OUTPATIENT
Start: 2021-05-18

## 2021-05-18 RX ORDER — AZITHROMYCIN 500 MG/1
1200 TABLET, FILM COATED ORAL WEEKLY
Qty: 10 TABLET | Refills: 2 | Status: SHIPPED | OUTPATIENT
Start: 2021-05-18 | End: 2021-06-17

## 2021-05-18 RX ORDER — FLUCONAZOLE 100 MG/1
200 TABLET ORAL DAILY
Qty: 16 TABLET | Refills: 0 | Status: SHIPPED | OUTPATIENT
Start: 2021-05-18 | End: 2021-05-26

## 2021-05-18 RX ORDER — SULFAMETHOXAZOLE AND TRIMETHOPRIM 800; 160 MG/1; MG/1
1 TABLET ORAL DAILY
Qty: 30 TABLET | Refills: 2 | Status: SHIPPED | OUTPATIENT
Start: 2021-05-18

## 2021-05-18 RX ADMIN — SULFAMETHOXAZOLE AND TRIMETHOPRIM 334.4 MG: 80; 16 INJECTION, SOLUTION, CONCENTRATE INTRAVENOUS at 14:37

## 2021-05-18 RX ADMIN — BENZONATATE 100 MG: 100 CAPSULE ORAL at 09:10

## 2021-05-18 RX ADMIN — VALGANCICLOVIR 900 MG: 450 TABLET ORAL at 09:10

## 2021-05-18 RX ADMIN — FLUCONAZOLE 200 MG: 200 INJECTION, SOLUTION INTRAVENOUS at 16:33

## 2021-05-18 RX ADMIN — MULTIPLE VITAMINS W/ MINERALS TAB 1 TABLET: TAB at 09:10

## 2021-05-18 RX ADMIN — SULFAMETHOXAZOLE AND TRIMETHOPRIM 334.4 MG: 80; 16 INJECTION, SOLUTION, CONCENTRATE INTRAVENOUS at 05:46

## 2021-05-18 RX ADMIN — BICTEGRAVIR SODIUM, EMTRICITABINE, AND TENOFOVIR ALAFENAMIDE FUMARATE 1 TABLET: 50; 200; 25 TABLET ORAL at 09:11

## 2021-05-18 ASSESSMENT — ENCOUNTER SYMPTOMS
NAUSEA: 0
SHORTNESS OF BREATH: 0
DIARRHEA: 1
ABDOMINAL PAIN: 0
COUGH: 0

## 2021-05-18 ASSESSMENT — VISUAL ACUITY: OU: 1

## 2021-05-18 NOTE — PROGRESS NOTES
[facility] -   Infectious diseases evaluation  admission date 5/11/2021    reason for consultation:   Pneumonia    Impression :   Current:  · HIV infection/AIDS  · Pneumonia ,cavitary and noncavitary lung nodules(differential diagnoses includes bacterial, viral, fungal, PCP and Mycobacterial infection)  · Multiple splenic hypodensities   · Esophageal candidiasis   · Suspected CMV retinitis  · Thrush  · Latent syphilis   · Intermediate QuantiFERON TB test  · Thrombocytopenia  · Hyponatremia  · Smoking  · History of kidney stones        Recommendations   · Bronchoscopy done 5/17 pending cultures   ·  IV Zithromax discontinued 5/16/2021 Zithromax 1200mg weekly  · Continue IV cefepime for another day  · Continue Diflucan  · Continue IV Bactrim  · Valganciclovir p.o. 900 twice a day  · Biktarvy  · Penicillin G IM 2.4 million weekly for 3 doses, first dose today  · HIV viral load pending  · CD4 count 24  · Echocardiogram negative for vegetations   · Hepatitis panel negative  · CMV PCR pending  · Stool for C. difficile negative  · Urine for Legionella antigen negative  · Urine for GC and Chlamydia DNA negative   · COVID-19 rapid test negative  · Follow blood cultures no growth to date  · AFB smear negative airborne isolation discontinued  · HIV genotype pending    History of Present Illness:   Initial history:  Connor Liz is a 45y.o.-year-old male presented to hospital with worsening shortness of breath associated with cough productive white phlegm, generalized weakness and subjective fever for 4 months, gradual onset, continues, moderate in severity, no alleviating or aggravating factors. Patient also had decreased appetite, dizziness, significant weight loss. He was constipated initially then developed diarrhea after stool softeners.   He also had thrush, tried several over-the-counter treatments with no improvement  He is homosexual, had multiple partners in the respiratory distress. Breath sounds: Rhonchi present. No wheezing. Abdominal:      General: There is no distension. Palpations: Abdomen is soft. Tenderness: There is no abdominal tenderness. Musculoskeletal:      Cervical back: Neck supple. No rigidity. Right lower leg: No edema. Left lower leg: No edema. Skin:     General: Skin is dry. Coloration: Skin is pale. Skin is not jaundiced. Neurological:      General: No focal deficit present. Mental Status: He is oriented to person, place, and time.    Psychiatric:         Mood and Affect: Mood normal.         Behavior: Behavior normal.         Past Medical History:     Past Medical History:   Diagnosis Date    HIV (human immunodeficiency virus infection) ([facility] [address]     Hydronephrosis 2015    Pt unsure of dx time but believes about 6 yrs ago    Kidney stone        Past Surgical  History:     Past Surgical History:   Procedure Laterality Date    APPENDECTOMY  2005    Pt believes when he was about 25yrs     BRONCHOSCOPY N/A 5/17/2021    BRONCHOSCOPY performed by Judi Friend MD at [place] [address]  5/17/2021         COLONOSCOPY N/A 5/13/2021    COLONOSCOPY DIAGNOSTIC performed by Jimmie Leung MD at [address] [place] [address] 5/13/2021    EGD BIOPSY performed by Jimmie Leung MD at [facility]       Medications:      benzonatate  100 mg Oral TID    bictegravir-emtricitab-tenofovir alafenamide  1 tablet Oral Daily    valGANciclovir  900 mg Oral BID    fluconazole  200 mg Intravenous Q24H    sulfamethoxazole-trimethoprim (BACTRIM) IVPB  5 mg/kg Intravenous Q8H    sodium chloride flush  5-40 mL Intravenous 2 times per day    [Held by provider] enoxaparin  40 mg Subcutaneous Daily    nicotine  1 patch Transdermal Daily    therapeutic multivitamin-minerals  1 tablet Oral Daily    ondansetron  4 mg Intravenous Once       Social History:     Social History     Socioeconomic History  Marital status: Single     Spouse name: Not on file    Number of children: Not on file    Years of education: Not on file    Highest education level: Not on file   Occupational History    Not on file   Tobacco Use    Smoking status: Current Every Day Smoker     Packs/day: 0.50     Types: Cigarettes    Smokeless tobacco: Never Used    Tobacco comment: HAsn't craved for past 5 months   Vaping Use    Vaping Use: Never used   Substance and Sexual Activity    Alcohol use: Not Currently    Drug use: Not Currently     Types: Marijuana     Comment: Feburary 2021 last time     Sexual activity: Not Currently     Partners: Male   Other Topics Concern    Not on file   Social History Narrative    Not on file     Social Determinants of Health     Financial Resource Strain:     Difficulty of Paying Living Expenses:    Food Insecurity:     Worried About Running Out of Food in the Last Year:     Ran Out of Food in the Last Year:    Transportation Needs:     Lack of Transportation (Medical):  Lack of Transportation (Non-Medical):    Physical Activity:     Days of Exercise per Week:     Minutes of Exercise per Session:    Stress:     Feeling of Stress :    Social Connections:     Frequency of Communication with Friends and Family:     Frequency of Social Gatherings with Friends and Family:     Attends Jewish Services:     Active Member of Clubs or Organizations:     Attends Club or Organization Meetings:     Marital Status:    Intimate Partner Violence:     Fear of Current or Ex-Partner:     Emotionally Abused:     Physically Abused:     Sexually Abused:        Family History:   History reviewed. No pertinent family history.    Medical Decision Making:   I have independently reviewed/ordered the following labs:    CBC with Differential:   Recent Labs     05/17/21  0639 05/18/21  0608   WBC 8.8 8.1   HGB 9.2* 8.4*   HCT 27.4* 25.0*   * 115*     BMP:  Recent Labs     05/16/21  0514 05/17/21  0639 05/18/21  0608   * 132* 128*   K 3.5* 3.7 3.7    100 95*   CO2 23 22 24   BUN 10 9 9   CREATININE 0.76 0.59* 0.79   MG 2.1  --   --        Lab Results   Component Value Date    CREATININE 0.79 05/18/2021    GLUCOSE 106 05/18/2021       Detailed results: Thank you for allowing us to participate in the care of this patient. Please call with questions. This note is created with the assistance of a speech recognition program.  While intending to generate adocument that actually reflects the content of the visit, the document can still have some errors including those of syntax and sound a like substitutions which may escape proof reading. It such instances, actual meaningcan be extrapolated by contextual diversion. Hu Rivero MD  Family Medicine Resident     Attending Physician Statement  I have discussed the care of the patient, including pertinent history and exam findings,  with the resident. I have reviewed the key elements of all parts of the encounter with the resident. I agree with the assessment, plan and orders as documented by the resident.   Discussed with Dr. Verito Bolton MD

## 2021-05-18 NOTE — DISCHARGE INSTR - COC
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    Multiple pulmonary nodules R91.8       Isolation/Infection:   Isolation            Airborne          Patient Infection Status       Infection Onset Added Last Indicated Last Indicated By Review Planned Expiration Resolved Resolved By    None active    Resolved    C-diff Rule Out 05/12/21 05/12/21 05/12/21 C DIFF TOXIN/ANTIGEN (Ordered)   05/12/21 Rule-Out Test Resulted    COVID-19 Rule Out 05/12/21 05/12/21 05/12/21 COVID-19, Rapid (Ordered)   05/12/21 Rule-Out Test Resulted            Nurse Assessment:  Last Vital Signs: /80   Pulse 87   Temp 98.2 °F (36.8 °C) (Oral)   Resp 18   Ht 5' 11\" (1.803 m)   Wt 125 lb (56.7 kg)   SpO2 91%   BMI 17.43 kg/m²     Last documented pain score (0-10 scale): Pain Level: 0  Last Weight:   Wt Readings from Last 1 Encounters:   05/17/21 125 lb (56.7 kg)     Mental Status:  {IP PT MENTAL STATUS:48048:::0}    IV Access:  { SRINATH IV ACCESS:872073915:::0}    Nursing Mobility/ADLs:  Walking   {CHP DME ADLs:809315397:::0}  Transfer  {CHP DME ADLs:669275805:::0}  Bathing  {CHP DME ADLs:420378134:::0}  Dressing  {CHP DME ADLs:823604763:::0}  Toileting  {CHP DME ADLs:617946244:::0}  Feeding  {CHP DME ADLs:832784188:::0}  Med Admin  {CHP DME ADLs:333485636:::0}  Med Delivery   { SRINATH MED Delivery:476191051:::0}    Wound Care Documentation and Therapy:        Elimination:  Continence:    Bowel: {YES / BR:54128}  Bladder: {YES / PETER:35118}  Urinary Catheter: {Urinary Catheter:623321677:::0}   Colostomy/Ileostomy/Ileal Conduit: {YES / MV:36633}       Date of Last BM: ***    Intake/Output Summary (Last 24 hours) at 5/18/2021 1144  Last data filed at 5/18/2021 1000  Gross per 24 hour   Intake 2688 ml   Output 2960 ml   Net -272 ml     I/O last 3 completed shifts: In: 2688 [P.O.:400;  I.V.:1288; IV Piggyback:1000]  Out: 2100 [Urine:2100]    Safety Concerns:     89 [address] YO Safety Concerns:968675160:::0}    Impairments/Disabilities:      [address] YO Impairments/Disabilities:485467428:::0}    Nutrition Therapy:  Current Nutrition Therapy:   [address]ran [facility] Diet List:092020383:::0}    Routes of Feeding: {[facility] DME Other Feedings:914172535:::0}  Liquids: {Slp liquid thickness:66207}  Daily Fluid Restriction: {CHP DME Yes amt example:934550663:::0}  Last Modified Barium Swallow with Video (Video Swallowing Test): {Done Not Done MLJI:476160451:::6}    Treatments at the Time of Hospital Discharge:   Respiratory Treatments: ***  Oxygen Therapy:  {Therapy; copd oxygen:90023:::0}  Ventilator:    {[facility] Vent List:742003610:::0}    Rehab Therapies: {THERAPEUTIC INTERVENTION:7745053453}  Weight Bearing Status/Restrictions: [address]ran  Weight Bearin:::0}  Other Medical Equipment (for information only, NOT a DME order):  {EQUIPMENT:355121909}  Other Treatments: ***    Patient's personal belongings (please select all that are sent with patient):  {[facility] DME Belongings:287029153:::0}    RN SIGNATURE:  {Esignature:405585826:::0}    CASE MANAGEMENT/SOCIAL WORK SECTION    Inpatient Status Date: ***    Readmission Risk Assessment Score:  Readmission Risk              Risk of Unplanned Readmission:  14           Discharging to Facility/ Agency   Name:   Address:  Phone:  Fax:    Dialysis Facility (if applicable)   Name:  Address:  Dialysis Schedule:  Phone:  Fax:    / signature: {Esignature:397330154:::0}    PHYSICIAN SECTION    Prognosis: {Prognosis:3250599452:::0}    Condition at Discharge: [address]ran Patient Condition:561551267:::0}    Rehab Potential (if transferring to Rehab): {Prognosis:8123886535:::0}    Recommended Labs or Other Treatments After Discharge: ***    Physician Certification: I certify the above information and transfer of [facility]  is necessary

## 2021-05-18 NOTE — PLAN OF CARE
Problem: Falls - Risk of:  Goal: Will remain free from falls  Description: Will remain free from falls  5/18/2021 1655 by Cierra Ni RN  Outcome: Completed  5/18/2021 0445 by Justine Lomeli RN  Outcome: Ongoing  Note: No attempt to get oob. Safe environment provided. Bed down and locked.  Uses call light appropriately   Goal: Absence of physical injury  Description: Absence of physical injury  Outcome: Completed     Problem: Patient Goal of the Day:  Goal: Patient Daily Goal Reviewed with:  Outcome: Completed  Goal: Short Term/Patient Specific Goal:  Outcome: Completed  Goal: Patient's reason for admission:  Outcome: Completed     Problem: Infection:  Goal: Will remain free from infection  Description: Will remain free from infection  Outcome: Completed     Problem: Safety:  Goal: Free from accidental physical injury  Description: Free from accidental physical injury  Outcome: Completed  Goal: Free from intentional harm  Description: Free from intentional harm  Outcome: Completed     Problem: Skin Integrity:  Goal: Skin integrity will stabilize  Description: Skin integrity will stabilize  Outcome: Completed     Problem: Discharge Planning:  Goal: Patients continuum of care needs are met  Description: Patients continuum of care needs are met  Outcome: Completed     Problem: Nutrition  Goal: Optimal nutrition therapy  Description: Nutrition Problem #1: Severe malnutrition  Intervention: Food and/or Nutrient Delivery: Continue Current Diet, Start Oral Nutrition Supplement  Nutritional Goals: po intake greater than 50%     Outcome: Completed     Problem: Skin Integrity:  Goal: Will show no infection signs and symptoms  Description: Will show no infection signs and symptoms  Outcome: Completed  Goal: Absence of new skin breakdown  Description: Absence of new skin breakdown  5/18/2021 1655 by Cierra Ni RN  Outcome: Completed  5/18/2021 0445 by Justine Lomeli RN  Note: No new area of skin breakdown noted.  Repositions self frequently to prevent skin breakdown

## 2021-05-18 NOTE — PROGRESS NOTES
[address] [facility].    PROGRESS NOTE             5/18/2021    9:43 AM    Name:   Sylvester Dias  MRN:     [id number]     Acct:      [de-identified]   Room:   2068/2068-01  IP Day:  6  Admit Date:  5/11/2021 10:51 PM    PCP:  No primary care provider on file. Code Status:  Full Code    Subjective:     C/C:   Chief Complaint   Patient presents with    Fever     Pt states fever, dizziness, dehydration, no appetite, constipation, weight loss, and several other complaints. Interval History Status: improved. Patient seen and examined at bedside. VSS, no fevers overnight. Bronchoscopy done yesterday, awaiting acid fast stain. On day 6 of Diflucan, Day 6 of Bactrim, day 4 of Valganciclovir. Patient states he is feeling better, especially after bronchoscopy. Otherwise has no other complaints. Brief History:     45year old male with no significant past medical history presented due to fever, night sweats, dry cough and shortness of breath that all began in January of 2021. Patient is homosexual, has sex with men. States he went to an urgent care clinic and was given a z pack in late January. Symptoms improved a little bit, but came back shortly after. States over the past month his cough has been worsening and he has been getting fevers and night sweats every day. Has had a 20-30 lb weight loss in the past 5 months. Complaining of decreased appetite as well. States he has been tested for HIV in the past and has always been negative. Patient also has Oral Thrush.      CXR done in the ER showed small airway disease and reticulonodular interstitial disease. CT Chest Abdomen Pelvis done on 05/12 showing innumerable cavitating and non cavitating mostly sub centimeter pulmonary nodules and tree-in-bud nodules noted diffusely throughout the lungs, some are centrilobular in distribution. Trace pleural effusion.  Multiple splenic hypo densities with ill defined margins up to 1 cm in size likely micro abscesses. ID and GI consulted. Patient started on Vancomycin, Cefepime and Azithromycin. HIV screen positive. EGD done on 05/13 showing candida esophagitis, started on IV Diflucan. Vancomycin discontinued due to negative MRSA swab. Patient started on IV Bactrim on 05/13.     Review of Systems:     Review of Systems   Constitutional: Positive for fatigue. Negative for fever. HENT: Negative for congestion. Eyes: Negative for visual disturbance. Respiratory: Negative for cough and shortness of breath. Cardiovascular: Negative for chest pain. Gastrointestinal: Positive for diarrhea. Negative for abdominal pain and nausea. Endocrine: Negative for polyuria. Genitourinary: Negative for dysuria. Skin: Negative for rash. Neurological: Negative for weakness. All other systems reviewed and are negative. Medications: Allergies:     Allergies   Allergen Reactions    Bee Venom        Current Meds:   Scheduled Meds:    benzonatate  100 mg Oral TID    bictegravir-emtricitab-tenofovir alafenamide  1 tablet Oral Daily    valGANciclovir  900 mg Oral BID    fluconazole  200 mg Intravenous Q24H    sulfamethoxazole-trimethoprim (BACTRIM) IVPB  5 mg/kg Intravenous Q8H    sodium chloride flush  5-40 mL Intravenous 2 times per day    [Held by provider] enoxaparin  40 mg Subcutaneous Daily    nicotine  1 patch Transdermal Daily    therapeutic multivitamin-minerals  1 tablet Oral Daily    ondansetron  4 mg Intravenous Once     Continuous Infusions:    sodium chloride      sodium chloride 75 mL/hr at 05/17/21 2209    sodium chloride       PRN Meds: loperamide, metoprolol, perflutren lipid microspheres, sodium chloride flush, sodium chloride, potassium chloride **OR** potassium alternative oral replacement **OR** potassium chloride, magnesium sulfate, promethazine **OR** ondansetron, polyethylene glycol, acetaminophen **OR** acetaminophen, sodium chloride flush, sodium chloride    Data:     Past Medical History:   has a past medical history of HIV (human immunodeficiency virus infection) ([address] Hydronephrosis, and Kidney stone. Social History:   reports that he has been smoking cigarettes. He has been smoking about 0.50 packs per day. He has never used smokeless tobacco. He reports previous alcohol use. He reports previous drug use. Drug: Marijuana. Family History: History reviewed. No pertinent family history. Vitals:  /80   Pulse 87   Temp 98.2 °F (36.8 °C) (Oral)   Resp 18   Ht 5' 11\" (1.803 m)   Wt 125 lb (56.7 kg)   SpO2 91%   BMI 17.43 kg/m²   Temp (24hrs), Av.9 °F (37.2 °C), Min:98.2 °F (36.8 °C), Max:99.5 °F (37.5 °C)    No results for input(s): POCGLU in the last 72 hours. I/O(24Hr):     Intake/Output Summary (Last 24 hours) at 2021 0943  Last data filed at 2021 0756  Gross per 24 hour   Intake 2688 ml   Output 2360 ml   Net 328 ml       Labs:    CBC with Differential:    Lab Results   Component Value Date    WBC 8.1 2021    RBC 2.80 2021    HGB 8.4 2021    HCT 25.0 2021     2021    MCV 89.3 2021    MCH 29.9 2021    MCHC 33.4 2021    RDW 15.2 2021    LYMPHOPCT 2 2021    MONOPCT 3 2021    BASOPCT 0 2021    MONOSABS 0.16 2021    LYMPHSABS 0.16 2021    EOSABS 0.00 2021    BASOSABS 0.00 2021    DIFFTYPE NOT REPORTED 2021     BMP:    Lab Results   Component Value Date     2021    K 3.7 2021    CL 95 2021    CO2 24 2021    BUN 9 2021    LABALBU 2.1 2021    CREATININE 0.79 2021    CALCIUM 7.3 2021    GFRAA >60 2021    LABGLOM >60 2021    GLUCOSE 106 2021       Lab Results   Component Value Date/Time    SPECIAL NOT REPORTED 2021 05:40 PM     Lab Results   Component Value Date/Time    CULTURE PENDING 2021 01:36 PM    CULTURE PENDING 05/17/2021 01:36 PM         Radiology:    ECHO Complete 2D W Doppler W Color    Result Date: 5/13/2021  [facility] Transthoracic Echocardiography Report (TTE)  Patient Name Sharla Leavitt    Date of Study                 05/13/2021               [facility]   Date of      1980  Gender                        Male  Birth   Age          45 year(s)  Race                             Room Number  2098        Height:                       70.87 inch, 180 cm   Corporate ID [id number]    Weight:                       146 pounds, 66 kg  #   Patient Yannick [de-identified]   BSA:           1.84 m^2       BMI:      20.37  #                                                                kg/m^2   MR #         [id number]      Sonographer                   Merari Neff   Accession #  [id number]  Interpreting Physician        Marion Solo   Fellow                   Referring Nurse Practitioner  Andrzej Rodriguez   Interpreting             Referring Physician  Fellow  Type of Study   TTE procedure:2D Echocardiogram, M-Mode, Doppler, Color Doppler. Procedure Date Date: 05/13/2021 Start: 10:51 AM Study Location: [address] [place] [address] Technical Quality: Fair visualization Patient Status: Inpatient Height: 70.87 inches Weight: 145.53 pounds BSA: 1.84 m^2 BMI: 20.37 kg/m^2 Rhythm: Within normal limits HR: 86 bpm BP: 93/56 mmHg CONCLUSIONS Summary Small LV cavity. Estimated LV EF 50-55 %. Mild left ventricular hypertrophy. Normal right ventricular size and function. Thickened mitral valve leaflets. Mild mitral regurgitation. Mild tricuspid regurgitation. Normal right ventricular systolic pressure. Trivial pericardial effusion.  Signature ----------------------------------------------------------------------------  Electronically signed by Merari Neff(Sonographer) on 05/13/2021 03:03  PM ---------------------------------------------------------------------------- ----------------------------------------------------------------------------  Electronically signed by Rosibel SoloInterpreting physician) on  2021 05:23 PM ---------------------------------------------------------------------------- FINDINGS Left Atrium Left atrium is normal in size. Left Ventricle Small LV cavity. Estimated LV EF 50-55 %. Mild left ventricular hypertrophy. Right Atrium Right atrium is normal in size. Right Ventricle Normal right ventricular size and function. Mitral Valve Thickened mitral valve leaflets. Mild mitral regurgitation. Aortic Valve Normal aortic valve structure and function without stenosis or regurgitation. Tricuspid Valve Normal tricuspid valve structure and function. Mild tricuspid regurgitation. Normal right ventricular systolic pressure. Pulmonic Valve The pulmonic valve is normal in structure. Trace pulmonic insufficiency. Pericardial Effusion Trivial pericardial effusion. Miscellaneous Normal aortic root dimension. IVC normal diameter & inspiratory collapse indicating normal RA filling pressure .  E/e' average 5.6 M-mode / 2D Measurements & Calculations:   LVIDd:3.92 cm(3.7 - 5.6 cm)      Diastolic UEXYGB:77.3 ml  CYULO:4.08 cm(2.2 - 4.0 cm)      Systolic EDTUKO:15.2 ml  UAJN:9.05 cm(0.6 - 1.1 cm)       Aortic Root:3.2 cm(2.0 - 3.7 cm)  LVPWd:1.18 cm(0.6 - 1.1 cm)      LA Dimension: 3.6 cm(1.9 - 4.0 cm)  Fractional Shortenin.4 %     LA volume/Index: 28 ml /15m^2  Calculated LVEF (%): 69.1 %      LVOT:2.1 cm   Mitral:                                Aortic   Peak E-Wave: 0.78 m/s                  Peak Velocity: 1.24 m/s  Peak A-Wave: 0.52 m/s                  Mean Velocity: 0.76 m/s  E/A Ratio: 1.48                        Peak Gradient: 6.15 mmHg  Peak Gradient: 2.4 mmHg                Mean Gradient: 3 mmHg  Deceleration Time: 158 msec                                          Area (continuity): 2.57 cm^2                                         AV VTI: 20.2 cm Tricuspid:   Peak TR Velocity: 1.79 m/s  Peak TR Gradient: 12.8164 mmHg  Septal Wall E' velocity:0.12 m/s Lateral Wall E' velocity:0.17 m/s    XR CHEST PORTABLE    Result Date: 5/11/2021  EXAMINATION: ONE XRAY VIEW OF THE CHEST 5/11/2021 11:27 pm COMPARISON: None. HISTORY: ORDERING SYSTEM PROVIDED HISTORY: Cough, SOB TECHNOLOGIST PROVIDED HISTORY: Cough, SOB Reason for Exam: Cough, SOB Acuity: Acute Type of Exam: Initial Additional signs and symptoms: Cough, SOB Relevant Medical/Surgical History: Cough, SOB FINDINGS: Lungs are hyperaerated. There are increased reticulonodular interstitial markings throughout. Mediastinum normal.  Bony thorax intact. Small airways disease and reticulonodular interstitial disease. Differential considerations include infectious and inflammatory interstitial lung disease. CT CHEST ABDOMEN PELVIS W CONTRAST    Result Date: 5/12/2021  EXAMINATION: CT OF THE CHEST, ABDOMEN, AND PELVIS WITH CONTRAST 5/12/2021 1:49 pm TECHNIQUE: CT of the chest, abdomen and pelvis was performed with the administration of intravenous contrast. Multiplanar reformatted images are provided for review. Dose modulation, iterative reconstruction, and/or weight based adjustment of the mA/kV was utilized to reduce the radiation dose to as low as reasonably achievable. COMPARISON: No priors HISTORY: ORDERING SYSTEM PROVIDED HISTORY: cachexia TECHNOLOGIST PROVIDED HISTORY: cachexia rule out lymphoma,legionella pneumonia? Reason for Exam: cachexia, rule out lymphoma,legionella pneumonia? Acuity: Unknown Type of Exam: Unknown FINDINGS: Chest: Mediastinum: The cardiac size is normal. 1.7 cm low right paratracheal lymph node. Smaller high right paratracheal lymph nodes. Calcified subcarinal lymph nodes. .  The thyroid gland shows no significant abnormalities. The esophagus shows no significant abnormalities. Lungs/pleura:  Innumerable round rim and tree-in-bud nodules/micro nodules some of which demonstrate cavitation. There is a dominant 1.8 x 3.1 cm consolidation anterior segment right upper lobe showing some air bronchograms. Findings most supportive of infectious etiology including pneumonias and septic emboli in appropriate clinical setting. Trace right pleural effusion. Minor subsegmental atelectasis at the posterior sulci. No pneumothorax. Soft Tissues/Bones: No acute abnormality of the bones. The superficial soft tissues show no significant abnormalities. Abdomen/Pelvis: Organs: Liver shows no focal lesions. Spleen demonstrates multiple scattered hypodensities with ill-defined margins to about a cm in size. Differential would include microabscesses and lymphoma. Some metastatic disease can have similar appearance. Kidneys, adrenal glands, pancreas and gallbladder unremarkable. GI/Bowel: No acute process. No bowel obstruction. No focal lesions. Pelvis: Urinary bladder grossly normal.  No suspicious pelvic mass. Peritoneum/Retroperitoneum: No free fluid. No retroperitoneal lymphadenopathy. Bones/Soft Tissues: No acute abnormality of the bones. The superficial soft tissues show no significant abnormalities. There are innumerable cavitating and non cavitating mostly subcentimeter pulmonary nodules and tree-in-bud nodules noted diffusely throughout the lungs. Some around them whereas others are centrilobular in distribution. Trace pleural effusion. Differential consideration is pneumonia versus septic emboli. Multiple scattered splenic hypodensities with ill-defined margins. These measure up to a cm in size. Most likely micro abscesses. Differential would include lymphoma and metastatic disease. Physical Examination:        Physical Exam  Vitals reviewed. Constitutional:       General: He is not in acute distress. Appearance: He is ill-appearing. HENT:      Head: Normocephalic. Eyes:      General: Vision grossly intact.    Cardiovascular:      Rate and Rhythm: Normal rate and regular rhythm. Heart sounds: Normal heart sounds. Pulmonary:      Breath sounds: Wheezing present. Abdominal:      General: Abdomen is flat. Palpations: Abdomen is soft. Tenderness: There is no abdominal tenderness. Musculoskeletal:      Cervical back: Neck supple. Skin:     Findings: No rash. Neurological:      Mental Status: He is lethargic. Psychiatric:         Behavior: Behavior is cooperative. Assessment:        Primary Problem  AIDS (acquired immune deficiency syndrome) ([facility]    Active Hospital Problems    Diagnosis Date Noted    Multiple pulmonary nodules [R91.8] 05/17/2021    CMV retinitis ([facility] [B25.9, H30.90] 05/15/2021    AIDS (acquired immune deficiency syndrome) ([facility] [B20] 05/13/2021    Candida esophagitis (HCC) [B37.81] 05/13/2021    Acute cytomegalovirus infection ([facility] [B25.9] 05/13/2021    Pneumonia due to organism [J18.9] 05/12/2021    Tobacco use [Z72.0] 05/12/2021    Weight loss [R63.4] 05/12/2021    Dehydration [E86.0] 05/12/2021    Hyponatremia [E87.1] 05/12/2021    Anemia [D64.9]     Elevated LFTs [R79.89]        Plan:        Bilateral Atypical Pneumonia 2/2 Suspected PCP Pneumonia in the Setting of Acute HIV Infection  - CXR on 05/11: Small airway disease and reticulonodular interstitial disease  - CT Chest Abdomen 05/12: Innumerable cavitating and non cavitating mostly sub centimeter pulmonary nodules and tree-in-bud nodules noted diffusely throughout the lungs, some are centrilobular in distribution (differentials include pneumonia or septic emboli). Trace pleural effusion.  Multiple splenic hypo densities with ill defined margins up to 1 cm in size likely micro abscesses (differentials include lymphoma or metastatic disease)  - Blood cultures NGTD  - Legionella urine negative, COVID negative   - Alpha 1 antitrypsin negative, AFP wnl, Ceruloplasmin wnl, Mitochondrial antibodies negative  - EBV IgG positive  - CMV IgG and IgM positive  - Pro Calcitonin 18.94  - HIV Ag/Ab positive  - CD4 count of 25, HIV RNA viral load PENDING  - Mycoplasma IgM negative   - Will get induced sputum for pneumocystis stain and respiratory culture, pending   - ID on board              - Needs bronchoscopy              - 5 days of Zithromax and Cefepime completed               - Diflucan day 6              - IV Bactrim Day 6              - Echocardiogram wnl, EF of 50-55%              - Hepatitis panel negative              - GC and Chlamydia negative              - C diff negative              - Started (Biktarvy) bictegravir-emtricitab- tenofovir alafenamide   - Pulmonology on board              - Await blood cultures to rule out septic emboli              - Bronchoscopy on 05/17/2021    - BAL cell count: 48% neutrophils, 14% lymphocytes, 38% macrophages    - Culture showing gram positive cocci in pairs and clusters    - Legionella, fungal and viral cultures pending    - Acid fast culture and smear pending     Possible Miliary Tuberculosis/Malignancy/Septic Emboli on Imaging  - CT Chest Abdomen 05/12: Innumerable cavitating and non cavitating mostly sub centimeter pulmonary nodules and tree-in-bud nodules noted diffusely throughout the lungs, some are centrilobular in distribution (differentials include pneumonia or septic emboli). Trace pleural effusion. Multiple splenic hypo densities with ill defined margins up to 1 cm in size likely micro abscesses (differentials include lymphoma or metastatic disease)  - Cavitations in chest are miliary-like  - Echocardiogram done 05/13 with LVEF 50-55%.  No vegetations  - Blood cultures NGTD  - TB Quantiferon indeterminate, recommend repeat testing in 1 month     CMV Retinitis  - Positive IgG and IgM  - CMV PCR pending  - S/P one dose of IV Ganciclovir 5 mg/kg  - PO Valganciclovir 900 mg BID day 4  - Ophthalmology on board              - Areas of granular atrophic changes in sectoral pattern superonasally              - Scattered cotton wool spots bilaterally              - Would recommend antiviral CMV treatment              - Follow up with Dr. Baljit Hassan at [address] after DC     Candida Esophagitis   - EGD 05/13: Significant esophagitis involving entire esophagus with white thick cottage cheese type discharge consistent with candida esophagitis  - IV Fluconazole Day 6     Syphilis Infection  - Positive IgG  - VDRL positive  - ID on board appreciate recs  - Penicillin G IM 3 doses with first dose on discharge per ID      Hyponatremia ,improving   - Sodium today 128     DVT Prophylaxis: Lovenox 40 mg SC daily   GI Prophylaxis: Ree Abel MD  5/18/2021  9:43 AM     I have discussed the care of Anahi Webb , including pertinent history and exam findings,    today with the resident. I have seen and examined the patient and the key elements of all parts of the encounter have been performed by me . I agree with the assessment, plan and orders as documented by the resident. Principal Problem:    AIDS (acquired immune deficiency syndrome) ([address]  Active Problems:    Pneumonia due to organism    Tobacco use    Weight loss    Dehydration    Hyponatremia    Anemia    Elevated LFTs    Candida esophagitis (HCC)    Acute cytomegalovirus infection (HCC)    CMV retinitis ([address]    Multiple pulmonary nodules  Resolved Problems:    * No resolved hospital problems. *        Overall  course ;                                   are improving over time. Patient feeling much better today  Started on HAART   Likely discharge later today  Gram stain is negative for AFB. Patient has drop in platelets more than 24%  Platelets staying stable  Low clinical concern for HIT, since HIV itself can cause thrombocytopenia  Will do CBC in a week.   As outpatient  Ordering peripheral smear          Electronically signed by Lori Engel MD

## 2021-05-18 NOTE — CARE COORDINATION
ONGOING DISCHARGE PLAN:    Patient is alert and oriented x4. Spoke with patient regarding discharge plan and patient confirms that plan is still to return to Home w/ Friend Chanell & Her Daughter.       Pt. Remains on IV Diflucan/Bactrim. ID continues to follow.      Pt. Had Bronch yesterday w/ Pulmonary. Per Primary Notes, Gram Stain is neg for AFB.     Pt. Has new DX of HIV, will F/U w/ Dr. Kapil Daniel OP.     Denies VNS. ?DC today. Denies needs. Will continue to follow for additional discharge needs.     Electronically signed by Silvestre Gee RN on 5/18/2021 at 1:53 PM

## 2021-05-18 NOTE — CARE COORDINATION
Writer was notified by Shawna Villeda, from [address] [facility], that they do not have Pt's Valcyte. They will need to order this & it will be available chris after 2PM.    Cost of all meds, is $0 Copay w/ Insurance. Shawna Villeda will discuss above w/ Pt. 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 Writer did discuss above w/ his Nurse, Vicente Foley.

## 2021-05-18 NOTE — PLAN OF CARE
Problem: Falls - Risk of:  Goal: Will remain free from falls  Description: Will remain free from falls  5/18/2021 0445 by Rogelio Loera RN  Outcome: Ongoing  Note: No attempt to get oob. Safe environment provided. Bed down and locked. Uses call light appropriately   5/17/2021 1635 by Korin Frank RN  Outcome: Ongoing     Problem: Skin Integrity:  Goal: Absence of new skin breakdown  Description: Absence of new skin breakdown  5/18/2021 0445 by Rogelio Loera RN  Note: No new area of skin breakdown noted.  Repositions self frequently to prevent skin breakdown   5/17/2021 1635 by Korin Frank RN  Outcome: Ongoing

## 2021-05-19 LAB
CULTURE: ABNORMAL
DIRECT EXAM: ABNORMAL
Lab: ABNORMAL
SPECIMEN DESCRIPTION: ABNORMAL

## 2021-05-19 NOTE — DISCHARGE SUMMARY
include lymphoma or metastatic disease). Blood cultures and respiratory cultures showed no growth during this admission. Various tests were ordered during admission, pertinent positive results included: Positive CMV IgM and IgG, CD4 count of 24, Positive VDRL. Patient received a total of 5 days of IV Zithromax and Cefepime for broad coverage. Did get 6 days of IV Bactrim for suspected PCP Pneumonia. EGD showed severe candida esophagitis and patient received 6 days of IV Diflucan. Was seen by ophthalmology and was suspected to have CMV retinitis, patient received 1 IV dose of Ganciclovir and then started on PO Valganciclovir. Echocardiogram was done which was negative. Patient did spike fevers throughout most of stay, almost lasts 2 days of admission he was afebrile. Hepatitis panel negative. Bronchoscopy was completed on May 17, BAL was done, Legionella, viral, fungal cultures were all taken. Due to the CT findings and patient's immunocompromised status there was suspicion for Miliary Tuberculosis. Acid fast smear was done from the BAL sample which was negative, although culture is still pending. Quantiferon test was done which returned INTERMEDIATE and recommended repeat testing in 1 month. During admission, patient was followed by Infectious Disease and Pulmonology/Critical Care. Was started on Biktarvy (Bictegravir-Emtricitab-Tenofovir) HIV medication during stay. Patients vitals were stable throughout admission other than the fevers. He did not require oxygen and was saturating well on room air. Progressively began to feel better throughout his admission. On day of discharge he is feeling well and ready to go home, clinically stable. Did discuss the importance of following up with his PCP, as well as following up with Infectious Disease Dr. Susan Mena for his HIV therapy and management. Significant therapeutic interventions: IV Antibiotics, Antivirals.     Significant Diagnostic Studies:   Labs / Micro:  CBC:   Lab Results   Component Value Date    WBC 8.1 05/18/2021    RBC 2.80 05/18/2021    HGB 8.4 05/18/2021    HCT 25.0 05/18/2021    MCV 89.3 05/18/2021    MCH 29.9 05/18/2021    MCHC 33.4 05/18/2021    RDW 15.2 05/18/2021     05/18/2021     BMP:    Lab Results   Component Value Date    GLUCOSE 106 05/18/2021     05/18/2021    K 3.7 05/18/2021    CL 95 05/18/2021    CO2 24 05/18/2021    ANIONGAP 9 05/18/2021    BUN 9 05/18/2021    CREATININE 0.79 05/18/2021    BUNCRER NOT REPORTED 05/18/2021    CALCIUM 7.3 05/18/2021    LABGLOM >60 05/18/2021    GFRAA >60 05/18/2021    GFR      05/18/2021    GFR NOT REPORTED 05/18/2021     Acid Fast Stain: No acid fast bacilli seen  Acid Fast culture: Pending  Blood cultures: Negative  Viral Respiratory culture: negative  GC negative  MRSA nasal negative  Urine culture negative  C Diff negative  Urine Legionella negative  Quantiferon TB Gold: Indeterminate.  Repeat test    Radiology:  ECHO Complete 2D W Doppler W Color    Result Date: 5/13/2021  16[address] [facility] Transthoracic Echocardiography Report (TTE)  Patient Name Santo Johnson    Date of Study                 05/13/2021               [facility]   Date of      1980  Gender                        Male  Birth   Age          45 year(s)  Race                             Room Number  2098        Height:                       70.87 inch, 180 cm   Corporate ID [id number]    Weight:                       146 pounds, 66 kg  #   Patient [place] [de-identified]   BSA:           1.84 m^2       BMI:      20.37  #                                                                kg/m^2   MR #         [id number]      Sonographer                   Merari Neff   Accession #  [id number]  Interpreting Physician        Marion Solo   Fellow                   Referring Nurse Practitioner  Aixadion Holter   Interpreting             Referring Physician  Fellow  Type of Study   TTE procedure:2D Echocardiogram, M-Mode, Doppler, Color Doppler. Procedure Date Date: 05/13/2021 Start: 10:51 AM Study Location: [address] [place] [address] Technical Quality: Fair visualization Patient Status: Inpatient Height: 70.87 inches Weight: 145.53 pounds BSA: 1.84 m^2 BMI: 20.37 kg/m^2 Rhythm: Within normal limits HR: 86 bpm BP: 93/56 mmHg CONCLUSIONS Summary Small LV cavity. Estimated LV EF 50-55 %. Mild left ventricular hypertrophy. Normal right ventricular size and function. Thickened mitral valve leaflets. Mild mitral regurgitation. Mild tricuspid regurgitation. Normal right ventricular systolic pressure. Trivial pericardial effusion. Signature ----------------------------------------------------------------------------  Electronically signed by Merari Neff(Sonographer) on 05/13/2021 03:03  PM ---------------------------------------------------------------------------- ----------------------------------------------------------------------------  Electronically signed by Marion Solo(Interpreting physician) on  05/13/2021 05:23 PM ---------------------------------------------------------------------------- FINDINGS Left Atrium Left atrium is normal in size. Left Ventricle Small LV cavity. Estimated LV EF 50-55 %. Mild left ventricular hypertrophy. Right Atrium Right atrium is normal in size. Right Ventricle Normal right ventricular size and function. Mitral Valve Thickened mitral valve leaflets. Mild mitral regurgitation. Aortic Valve Normal aortic valve structure and function without stenosis or regurgitation. Tricuspid Valve Normal tricuspid valve structure and function. Mild tricuspid regurgitation. Normal right ventricular systolic pressure. Pulmonic Valve The pulmonic valve is normal in structure. Trace pulmonic insufficiency. Pericardial Effusion Trivial pericardial effusion. Miscellaneous Normal aortic root dimension. IVC normal diameter & inspiratory collapse indicating normal RA filling pressure .  E/e' average 5.6 M-mode / 2D Measurements are provided for review. Dose modulation, iterative reconstruction, and/or weight based adjustment of the mA/kV was utilized to reduce the radiation dose to as low as reasonably achievable. COMPARISON: No priors HISTORY: ORDERING SYSTEM PROVIDED HISTORY: cachexia TECHNOLOGIST PROVIDED HISTORY: cachexia rule out lymphoma,legionella pneumonia? Reason for Exam: cachexia, rule out lymphoma,legionella pneumonia? Acuity: Unknown Type of Exam: Unknown FINDINGS: Chest: Mediastinum: The cardiac size is normal. 1.7 cm low right paratracheal lymph node. Smaller high right paratracheal lymph nodes. Calcified subcarinal lymph nodes. .  The thyroid gland shows no significant abnormalities. The esophagus shows no significant abnormalities. Lungs/pleura: Innumerable round rim and tree-in-bud nodules/micro nodules some of which demonstrate cavitation. There is a dominant 1.8 x 3.1 cm consolidation anterior segment right upper lobe showing some air bronchograms. Findings most supportive of infectious etiology including pneumonias and septic emboli in appropriate clinical setting. Trace right pleural effusion. Minor subsegmental atelectasis at the posterior sulci. No pneumothorax. Soft Tissues/Bones: No acute abnormality of the bones. The superficial soft tissues show no significant abnormalities. Abdomen/Pelvis: Organs: Liver shows no focal lesions. Spleen demonstrates multiple scattered hypodensities with ill-defined margins to about a cm in size. Differential would include microabscesses and lymphoma. Some metastatic disease can have similar appearance. Kidneys, adrenal glands, pancreas and gallbladder unremarkable. GI/Bowel: No acute process. No bowel obstruction. No focal lesions. Pelvis: Urinary bladder grossly normal.  No suspicious pelvic mass. Peritoneum/Retroperitoneum: No free fluid. No retroperitoneal lymphadenopathy. Bones/Soft Tissues: No acute abnormality of the bones.   The superficial soft tissues show no significant abnormalities. There are innumerable cavitating and non cavitating mostly subcentimeter pulmonary nodules and tree-in-bud nodules noted diffusely throughout the lungs. Some around them whereas others are centrilobular in distribution. Trace pleural effusion. Differential consideration is pneumonia versus septic emboli. Multiple scattered splenic hypodensities with ill-defined margins. These measure up to a cm in size. Most likely micro abscesses. Differential would include lymphoma and metastatic disease. Consultations:    Consults:     Final Specialist Recommendations/Findings:   IP CONSULT TO PRIMARY CARE PROVIDER  IP CONSULT TO GI  IP CONSULT TO INFECTIOUS DISEASES  PHARMACY TO DOSE VANCOMYCIN  IP CONSULT TO PULMONOLOGY  PHARMACY TO DOSE MEDICATION  IP CONSULT TO OPHTHALMOLOGY      The patient was seen and examined on day of discharge and this discharge summary is in conjunction with any daily progress note from day of discharge. Discharge plan:       Disposition: Home    Physician Follow Up:     NACHO Elise - [facility]  [address] [facility] [address]  [phone]      [facility] Doctor. 5/25/21 at 10:15, If unable to keep this scheduled apt. call office within 24 hours to cancel. Arrive 5 minutes early, Wear Mask, Bring Photo ID, Insurance information. Med List.     Jesenia Nails MD  [address] [place] [address],  [place] [address], [address] [place] [address]    Schedule an appointment as soon as possible for a visit in 4 week  Newly Diagnosed HIV/AIDS with PCP Pneumonia, Started on HAART.        Requiring Further Evaluation/Follow Up POST HOSPITALIZATION/Incidental Findings:     Diet: regular diet    Activity: As tolerated    Instructions to Patient:   - Take 1250 mg of Azithromycin (2.5 tablets of 500 mg) once a week  - Take 200 mg Fluconazole (2 tablets of 100 mg) for 8 more days  - Take HIV Medication Biktarvy once a day  - Take Bactrim once a day  - Take Valganciclovir 900 mg twice a medications, discharge plan and follow up. Electronically signed by   Blayne Russo MD  5/19/2021  1:53 PM      Thank you Dr. Colt Wren primary care provider on file. for the opportunity to be involved in this patient's care. Attending Physician Statement  I have discussed the care of Elsa Wei and I have examined the patient myselft and taken ros and hpi , including pertinent history and exam findings,  with the resident. I have reviewed the key elements of all parts of the encounter with the resident. I agree with the assessment, plan and orders as documented by the resident. I spent over 35 mins in direct patient care as above and reviewing medications and counseling for discharge .         Electronically signed by Armin Salinas MD

## 2021-05-19 NOTE — PROGRESS NOTES
Physician Progress Note      PATIENTPixie [place]ington  CSN #:                  [id number]  :                       1980  ADMIT DATE:       2021 10:51 PM  [address] [place] DATE:        2021 6:20 PM  RESPONDING  PROVIDER #:        Destinee Herrera MD          QUERY TEXT:    Pt admitted with HIV/Pneumonia . Noted documentation of Severe Malnutrition. If possible, please document in progress notes and discharge summary:      The medical record reflects the following:  Risk Factors: HIV  Clinical Indicators: Meets AND/ASPEN guidelines Energy Intake:  7 - 50% or   less of estimated energy requirements for 5 or more days  Weight Loss:  Unable to assess(wt history is stated)  Body Fat Loss:  (Severe body fat loss) Triceps  Muscle Mass Loss:  (severe muscle mass loss) Calf (gastrocnemius)  Fluid Accumulation:  No significant fluid accumulation  Treatment: Dietary consult, Enlive TID    Thank You!   Phoebe Brandt RN  RN Clinical   () [phone] (h) [phone]  Options provided:  -- Severe Malnutrition confirmed present on admission  -- Severe Malnutrition ruled out  -- Other - I will add my own diagnosis  -- Disagree - Not applicable / Not valid  -- Disagree - Clinically unable to determine / Unknown  -- Refer to Clinical Documentation Reviewer    PROVIDER RESPONSE TEXT:    Weight loss from HIV    Query created by: Pinky Palm on 2021 9:24 AM      Electronically signed by:  Destinee Herrera MD 2021 12:22 PM

## 2021-05-20 LAB
HIV GENOTYPE INTERP/COMMENTS: NORMAL
HIV-1 GENOTYPE: NORMAL

## 2021-05-23 LAB
CULTURE: NORMAL
Lab: NORMAL
SPECIMEN DESCRIPTION: NORMAL

## 2021-05-25 ENCOUNTER — TELEPHONE (OUTPATIENT)
Dept: INTERNAL MEDICINE CLINIC | Age: 41
End: 2021-05-25

## 2021-05-26 LAB
CULTURE: ABNORMAL
LEGIONELLA SPECIES CULTURE: NORMAL
Lab: ABNORMAL
SPECIMEN DESCRIPTION: ABNORMAL

## 2021-06-11 PROBLEM — E86.0 DEHYDRATION: Status: RESOLVED | Noted: 2021-05-12 | Resolved: 2021-06-11

## 2021-07-05 LAB
CULTURE: NORMAL
DIRECT EXAM: NORMAL
Lab: NORMAL
SPECIMEN DESCRIPTION: NORMAL

## 2022-04-10 ENCOUNTER — HOSPITAL ENCOUNTER (EMERGENCY)
Age: 42
Discharge: HOME | End: 2022-04-10
Attending: EMERGENCY MEDICINE
Payer: MEDICAID

## 2022-04-10 VITALS
RESPIRATION RATE: 16 BRPM | HEART RATE: 91 BPM | WEIGHT: 150 LBS | TEMPERATURE: 97.3 F | OXYGEN SATURATION: 97 % | BODY MASS INDEX: 20.32 KG/M2 | SYSTOLIC BLOOD PRESSURE: 109 MMHG | DIASTOLIC BLOOD PRESSURE: 76 MMHG | HEIGHT: 72 IN

## 2022-04-10 DIAGNOSIS — F12.90 MARIJUANA USE: Primary | ICD-10-CM

## 2022-04-10 PROCEDURE — 93005 ELECTROCARDIOGRAM TRACING: CPT | Performed by: STUDENT IN AN ORGANIZED HEALTH CARE EDUCATION/TRAINING PROGRAM

## 2022-04-10 PROCEDURE — 99285 EMERGENCY DEPT VISIT HI MDM: CPT

## 2022-04-10 ASSESSMENT — ENCOUNTER SYMPTOMS
COUGH: 0
WHEEZING: 0
CHEST TIGHTNESS: 0
SINUS PAIN: 0
TROUBLE SWALLOWING: 0
VOMITING: 0
NAUSEA: 0
CONSTIPATION: 0
FACIAL SWELLING: 0
ABDOMINAL PAIN: 0
DIARRHEA: 0
COLOR CHANGE: 0
SINUS PRESSURE: 0
SORE THROAT: 0
SHORTNESS OF BREATH: 0

## 2022-04-10 NOTE — ED PROVIDER NOTES
[address] ED  eMERGENCY dEPARTMENT eNCOUnter   Attending Attestation     Pt Name: Alpesh Camara  MRN: [id number]  [place] 1980  Date of evaluation: 4/10/22       Alpesh Camara is a 37 y.o. male who presents with Dizziness      History:   Patient is here after getting dizzy while smoking marijuana. Patient states that he has not smoked in a while and thinks that he just got a little dizzy from getting stone. Patient states he feels fine now no chest pain no palpitations. Exam: Vitals:   Vitals:    04/10/22 1618   BP: 109/76   Pulse: 91   Resp: 16   Temp: 97.3 °F (36.3 °C)   TempSrc: Oral   SpO2: 97%   Weight: 150 lb (68 kg)   Height: 6' (1.829 m)     Heart regular rate rhythm no murmurs. Lungs clear to auscultation bilaterally. I performed a history and physical examination of the patient and discussed management with the resident. I reviewed the residents note and agree with the documented findings and plan of care. Any areas of disagreement are noted on the chart. I was personally present for the key portions of any procedures. I have documented in the chart those procedures where I was not present during the key portions. I have personally reviewed all images and agree with the resident's interpretation. I have reviewed the emergency nurses triage note. I agree with the chief complaint, past medical history, past surgical history, allergies, medications, social and family history as documented unless otherwise noted below. Documentation of the HPI, Physical Exam and Medical Decision Making performed by medical students or scribes is based on my personal performance of the HPI, PE and MDM. I personally evaluated and examined the patient in conjunction with the APC and agree with the assessment, treatment plan, and disposition of the patient as recorded by the APC. Additional findings are as noted.     Yusuf Armas MD  Attending Emergency  Physician             Karyle Smalls, MD  04/10/22 1633

## 2022-04-10 NOTE — ED PROVIDER NOTES
[facility] ED  Emergency Department Encounter  Emergency Medicine Resident     Pt Name: Lindwood Bumpers  MRN: [id number]  [place] 1980  Date of evaluation: 4/10/22  PCP:  No primary care provider on file. CHIEF COMPLAINT       Chief Complaint   Patient presents with    Dizziness       HISTORY OFPRESENT ILLNESS  (Location/Symptom, Timing/Onset, Context/Setting, Quality, Duration, Modifying Factors,Severity.)      Lindwood Bumpers is a 37 y. o.yo male who presents with EMS after a syncopal episode while smoking marijuana. Patient states he has not used marijuana in roughly 3 to 4 months and he smoked a joint today when he became dizzy and had a syncopal episode. Patient denies striking his head, denies falling to the ground. He states he was able to help himself down. Patient denies any dizziness at this time. He does state that he is \"stoned \"and very tired. Patient mitts to history of HIV that is undetectable. PAST MEDICAL / SURGICAL / SOCIAL / FAMILY HISTORY      has a past medical history of HIV (human immunodeficiency virus infection) ([facility] [address] Hydronephrosis, and Kidney stone. has a past surgical history that includes Appendectomy (2005); Upper gastrointestinal endoscopy (N/A, 5/13/2021); Colonoscopy (N/A, 5/13/2021); Bronchoscopy flexible (5/17/2021); and bronchoscopy (N/A, 5/17/2021).      Social History     Socioeconomic History    Marital status: Single     Spouse name: Not on file    Number of children: Not on file    Years of education: Not on file    Highest education level: Not on file   Occupational History    Not on file   Tobacco Use    Smoking status: Current Every Day Smoker     Packs/day: 0.50     Types: Cigarettes    Smokeless tobacco: Never Used    Tobacco comment: HAsn't craved for past 5 months   Vaping Use    Vaping Use: Never used   Substance and Sexual Activity    Alcohol use: Not Currently    Drug use: Not Currently     Types: Marijuana Angelica Lee     Comment: Feburary 2021 last time     Sexual activity: Not Currently     Partners: Male   Other Topics Concern    Not on file   Social History Narrative    Not on file     Social Determinants of Health     Financial Resource Strain:     Difficulty of Paying Living Expenses: Not on file   Food Insecurity:     Worried About Running Out of Food in the Last Year: Not on file    Lyly of Food in the Last Year: Not on file   Transportation Needs:     Lack of Transportation (Medical): Not on file    Lack of Transportation (Non-Medical): Not on file   Physical Activity:     Days of Exercise per Week: Not on file    Minutes of Exercise per Session: Not on file   Stress:     Feeling of Stress : Not on file   Social Connections:     Frequency of Communication with Friends and Family: Not on file    Frequency of Social Gatherings with Friends and Family: Not on file    Attends Evangelical Services: Not on file    Active Member of [address] [place] [address] or Organizations: Not on file    Attends Club or Organization Meetings: Not on file    Marital Status: Not on file   Intimate Partner Violence:     Fear of Current or Ex-Partner: Not on file    Emotionally Abused: Not on file    Physically Abused: Not on file    Sexually Abused: Not on file   Housing Stability:     Unable to Pay for Housing in the Last Year: Not on file    Number of [place] in the Last Year: Not on file    Unstable Housing in the Last Year: Not on file       History reviewed. No pertinent family history. Allergies:  Bee venom    Home Medications:  Prior to Admission medications    Medication Sig Start Date End Date Taking?  Authorizing Provider   bictegravir-emtricitab-tenofovir alafenamide (BIKTARVY) -25 MG TABS per tablet Take 1 tablet by mouth daily 5/19/21   Storm Sue MD   valGANciclovir (VALCYTE) 450 MG tablet Take 2 tablets TWICE per day for 10 DAYS, FOLLOWED BY 2 tablets ONCE per day 5/18/21   Geri Khoury R Beba Vera MD   sulfamethoxazole-trimethoprim (BACTRIM DS;SEPTRA DS) 800-160 MG per tablet Take 1 tablet by mouth daily 5/18/21   Lisa Pan MD       REVIEW OFSYSTEMS    (2-9 systems for level 4, 10 or more for level 5)      Review of Systems   Constitutional: Positive for activity change. Negative for chills, diaphoresis, fatigue and fever. HENT: Negative for facial swelling, nosebleeds, sinus pressure, sinus pain, sore throat and trouble swallowing. Dry Mouth   Respiratory: Negative for cough, chest tightness, shortness of breath and wheezing. Cardiovascular: Negative for chest pain, palpitations and leg swelling. Gastrointestinal: Negative for abdominal pain, constipation, diarrhea, nausea and vomiting. Genitourinary: Negative for dysuria, flank pain and hematuria. Musculoskeletal: Negative for arthralgias, gait problem, neck pain and neck stiffness. Skin: Negative for color change, rash and wound. Neurological: Positive for dizziness, syncope and light-headedness. Negative for tremors, weakness, numbness and headaches. PHYSICAL EXAM   (up to 7 for level 4, 8 or more forlevel 5)      INITIAL VITALS:   ED Triage Vitals [04/10/22 1618]   BP Temp Temp Source Pulse Resp SpO2 Height Weight   109/76 97.3 °F (36.3 °C) Oral 91 16 97 % 6' (1.829 m) 150 lb (68 kg)       Physical Exam  Constitutional:       General: He is not in acute distress. Appearance: He is normal weight. He is not ill-appearing. HENT:      Head: Normocephalic and atraumatic. Right Ear: External ear normal.      Left Ear: External ear normal.      Nose: Nose normal.      Mouth/Throat:      Mouth: Mucous membranes are dry. Pharynx: Oropharynx is clear. No posterior oropharyngeal erythema. Eyes:      Extraocular Movements: Extraocular movements intact. Pupils: Pupils are equal, round, and reactive to light.       Comments: Bilateral conjunctival injection   Cardiovascular:      Rate and Rhythm: Normal rate and regular rhythm. Pulses: Normal pulses. Heart sounds: Normal heart sounds. Pulmonary:      Effort: Pulmonary effort is normal. No respiratory distress. Breath sounds: Normal breath sounds. Abdominal:      General: Abdomen is flat. Bowel sounds are normal. There is no distension. Palpations: Abdomen is soft. Tenderness: There is no abdominal tenderness. Musculoskeletal:         General: Normal range of motion. Cervical back: Normal range of motion. No muscular tenderness. Skin:     General: Skin is warm and dry. Neurological:      General: No focal deficit present. Mental Status: He is alert and oriented to person, place, and time. Cranial Nerves: No cranial nerve deficit. Sensory: No sensory deficit. DIFFERENTIAL  DIAGNOSIS     PLAN (LABS / IMAGING / EKG):  Orders Placed This Encounter   Procedures    EKG 12 Lead       MEDICATIONS ORDERED:  No orders of the defined types were placed in this encounter. DDX: Marijuana ingestion, vasovagal syncope, dehydration    Initial MDM/Plan: 37 y.o. male who presents with concerns for syncope after smoking a joint. Patient appears under the influence of marijuana, blood pressure was on the lower side, patient did receive 500 cc of fluid per EMS. We will plan for an EKG likely discharge home. DIAGNOSTIC RESULTS / EMERGENCYDEPARTMENT COURSE / MDM     LABS:  Labs Reviewed - No data to display      RADIOLOGY:  No results found. EKG  Normal sinus rhythm, RSR prime in V1, ventricular rate of 91 bpm    All EKG's are interpreted by the Emergency Department Physicianwho either signs or Co-signs this chart in the absence of a cardiologist.    EMERGENCY DEPARTMENT COURSE:  ED Course as of 04/10/22 1639   Sun Apr 10, 2022   1633 Patient seen and evaluated. Concerns for being stoned [CD]   [phone] Patient admits he can get a ride home.   Will discharge home [CD]      ED Course User Index  [CD] Yared Garcia DO          PROCEDURES:  None    CONSULTS:  None    CRITICAL CARE:  Please see attending documentation    FINAL IMPRESSION      1.  Marijuana use          DISPOSITION / PLAN     DISPOSITION    Decision to discharge home    PATIENT REFERRED TO:  [facility]  [place] [id number]  Schedule an appointment as soon as possible for a visit   If symptoms worsen, As needed    [facility] ED  [place] [id number]  [phone]  Go to   If symptoms worsen      DISCHARGE MEDICATIONS:  New Prescriptions    No medications on file       Yared Garcia DO  Emergency Medicine Resident    (Please note that portions of this note were completed with a voice recognition program.Efforts were made to edit the dictations but occasionally words are mis-transcribed.)        Yared Garcia DO  Resident  04/10/22 9874

## 2022-04-11 LAB
EKG ATRIAL RATE: 91 BPM
EKG P AXIS: 59 DEGREES
EKG P-R INTERVAL: 136 MS
EKG Q-T INTERVAL: 370 MS
EKG QRS DURATION: 112 MS
EKG QTC CALCULATION (BAZETT): 455 MS
EKG R AXIS: 64 DEGREES
EKG T AXIS: 66 DEGREES
EKG VENTRICULAR RATE: 91 BPM

## 2022-04-11 PROCEDURE — 93010 ELECTROCARDIOGRAM REPORT: CPT | Performed by: INTERNAL MEDICINE

## 2024-01-04 NOTE — PROGRESS NOTES
Pulmonary Progress Note  [facility]      Patient - Abhijeet Santana,  Age - 45 y.o.    - 1980      Room Number - 2068/2068-01   N -  [id number]   [facility]  Date of Admission -  2021 10:51 PM        Consulting Cici Carvajal MD  Primary Care Physician - No primary care provider on file. SUBJECTIVE   He feels fairly well not really coughing too much    OBJECTIVE   VITALS    height is 5' 11\" (1.803 m) and weight is 125 lb (56.7 kg). His oral temperature is 98.2 °F (36.8 °C). His blood pressure is 107/67 and his pulse is 94. His respiration is 18 and oxygen saturation is 90%. Body mass index is 17.43 kg/m². Temperature Range: Temp: 98.2 °F (36.8 °C) Temp  Av.3 °F (36.8 °C)  Min: 98.2 °F (36.8 °C)  Max: 98.6 °F (37 °C)  BP Range:  Systolic (42LYA), CGK:076 , Min:107 , VFK:983     Diastolic (43XDU), KSQ:08, Min:67, Max:86    Pulse Range: Pulse  Av.7  Min: 87  Max: 100  Respiration Range: Resp  Av.7  Min: 18  Max: 20  Current Pulse Ox[de-identified]  SpO2: 90 %  24HR Pulse Ox Range:  SpO2  Av.7 %  Min: 90 %  Max: 91 %  Oxygen Amount and Delivery: O2 Flow Rate (L/min): 5 L/min    Wt Readings from Last 3 Encounters:   21 125 lb (56.7 kg)       I/O (24 Hours)    Intake/Output Summary (Last 24 hours) at 2021 1715  Last data filed at 2021 1151  Gross per 24 hour   Intake 1988 ml   Output 3760 ml   Net -1772 ml       EXAM     General Appearance  Awake, alert, oriented, in no acute distress cachectic  HEENT - normocephalic, atraumatic.  []  Mallampati  [] Crowded airway   [] Macroglossia  []  Retrognathia  [] Micrognathia  []  Normal tongue size []  Normal Bite  [] Sharan sign positive    Neck - Supple,  trachea midline   Lungs -fairly clear  Cardiovascular - Heart sounds are normal.  Regular rate and rhythm   Abdomen - Soft, nontender, nondistended, no masses or organomegaly  Neurologic - There are no focal motor or sensory deficits  Skin - No bruising or bleeding  Extremities - No clubbing, cyanosis, edema    MEDS      benzonatate  100 mg Oral TID    bictegravir-emtricitab-tenofovir alafenamide  1 tablet Oral Daily    valGANciclovir  900 mg Oral BID    fluconazole  200 mg Intravenous Q24H    sulfamethoxazole-trimethoprim (BACTRIM) IVPB  5 mg/kg Intravenous Q8H    sodium chloride flush  5-40 mL Intravenous 2 times per day    [Held by provider] enoxaparin  40 mg Subcutaneous Daily    nicotine  1 patch Transdermal Daily    therapeutic multivitamin-minerals  1 tablet Oral Daily    ondansetron  4 mg Intravenous Once      sodium chloride      sodium chloride Stopped (05/18/21 1700)    sodium chloride       loperamide, metoprolol, perflutren lipid microspheres, sodium chloride flush, sodium chloride, potassium chloride **OR** potassium alternative oral replacement **OR** potassium chloride, magnesium sulfate, promethazine **OR** ondansetron, polyethylene glycol, acetaminophen **OR** acetaminophen, sodium chloride flush, sodium chloride    LABS   CBC   Recent Labs     05/18/21  0608   WBC 8.1   HGB 8.4*   HCT 25.0*   MCV 89.3   *     BMP:   Lab Results   Component Value Date     05/18/2021    K 3.7 05/18/2021    CL 95 05/18/2021    CO2 24 05/18/2021    BUN 9 05/18/2021    LABALBU 2.1 05/13/2021    CREATININE 0.79 05/18/2021    CALCIUM 7.3 05/18/2021    GFRAA >60 05/18/2021    LABGLOM >60 05/18/2021     ABGs:  Lab Results   Component Value Date    PHART 7.493 05/13/2021    PO2ART 85.3 05/13/2021    JRJ7SNX 29.5 05/13/2021      Lab Results   Component Value Date    MODE NOT REPORTED 05/13/2021     Ionized Calcium:  No results found for: IONCA  Magnesium:    Lab Results   Component Value Date    MG 2.1 05/16/2021     Phosphorus:  No results found for: PHOS     LIVER PROFILE No results for input(s): AST, ALT, LIPASE, BILIDIR, BILITOT, ALKPHOS in the last 72 hours.    Invalid input(s): AMYLASE,  ALB  INR No results for input(s): INR in the last 72 hours. PTT   Lab Results   Component Value Date    APTT 41.4 (H) 05/11/2021         RADIOLOGY     (See actual reports for details)    ASSESSMENT/PLAN   Principal Problem:    AIDS (acquired immune deficiency syndrome) ([facility] [address]  Active Problems:    Pneumonia due to organism    Tobacco use    Weight loss    Dehydration    Hyponatremia    Anemia    Elevated LFTs    Candida esophagitis (HCC)    Acute cytomegalovirus infection (HCC)    CMV retinitis ([facility] [address]    Multiple pulmonary nodules  Resolved Problems:    * No resolved hospital problems.  *    AFB smear negative  BAL results from cytology may be consistent with fungal infection  Awaiting culture results  Continue empiric treatment with antibiotics per ID  Encouraged patient to eat more  Electronically signed by Tyrese Sawyer MD on 5/18/2021 at 5:15 PM Normal for race

## 2024-12-08 ENCOUNTER — HOSPITAL ENCOUNTER (EMERGENCY)
Age: 44
Discharge: HOME | End: 2024-12-09
Attending: EMERGENCY MEDICINE
Payer: MEDICAID

## 2024-12-08 DIAGNOSIS — R40.4 TRANSIENT ALTERATION OF AWARENESS: Primary | ICD-10-CM

## 2024-12-08 LAB
ALBUMIN SERPL-MCNC: 4.5 G/DL (ref 3.5–5.2)
ALBUMIN/GLOB SERPL: 1.2 {RATIO} (ref 1–2.5)
ALP SERPL-CCNC: 83 U/L (ref 40–129)
ALT SERPL-CCNC: 10 U/L (ref 10–50)
AMMONIA PLAS-SCNC: 37 UMOL/L (ref 16–60)
ANION GAP SERPL CALCULATED.3IONS-SCNC: 18 MMOL/L (ref 9–16)
APAP SERPL-MCNC: <5 UG/ML (ref 10–30)
AST SERPL-CCNC: 20 U/L (ref 10–50)
BILIRUB SERPL-MCNC: 0.3 MG/DL (ref 0–1.2)
BUN SERPL-MCNC: 18 MG/DL (ref 6–20)
CALCIUM SERPL-MCNC: 9 MG/DL (ref 8.6–10.4)
CHLORIDE SERPL-SCNC: 102 MMOL/L (ref 98–107)
CHP ED QC CHECK: YES
CO2 SERPL-SCNC: 19 MMOL/L (ref 20–31)
CREAT SERPL-MCNC: 1.1 MG/DL (ref 0.7–1.2)
ERYTHROCYTE [DISTWIDTH] IN BLOOD BY AUTOMATED COUNT: 14.6 % (ref 11.8–14.4)
ETHANOL PERCENT: <0.01 %
ETHANOLAMINE SERPL-MCNC: <10 MG/DL (ref 0–0.08)
GFR, ESTIMATED: ABNORMAL ML/MIN/1.73M2
GLUCOSE BLD-MCNC: 142 MG/DL
GLUCOSE SERPL-MCNC: 137 MG/DL (ref 74–99)
HCT VFR BLD AUTO: 38.8 % (ref 40.7–50.3)
HGB BLD-MCNC: 12.6 G/DL (ref 13–17)
INR PPP: 0.9
MCH RBC QN AUTO: 31.5 PG (ref 25.2–33.5)
MCHC RBC AUTO-ENTMCNC: 32.5 G/DL (ref 28.4–34.8)
MCV RBC AUTO: 97 FL (ref 82.6–102.9)
NRBC BLD-RTO: 0 PER 100 WBC
PARTIAL THROMBOPLASTIN TIME: 28.4 SEC (ref 23–36.5)
PLATELET # BLD AUTO: 340 K/UL (ref 138–453)
PMV BLD AUTO: 8.7 FL (ref 8.1–13.5)
POTASSIUM SERPL-SCNC: 3.7 MMOL/L (ref 3.7–5.3)
PROT SERPL-MCNC: 8.2 G/DL (ref 6.6–8.7)
PROTHROMBIN TIME: 12.3 SEC (ref 11.7–14.9)
RBC # BLD AUTO: 4 M/UL (ref 4.21–5.77)
SALICYLATES SERPL-MCNC: <0.5 MG/DL (ref 0–10)
SODIUM SERPL-SCNC: 139 MMOL/L (ref 136–145)
TSH SERPL DL<=0.05 MIU/L-ACNC: 4.1 UIU/ML (ref 0.27–4.2)
WBC OTHER # BLD: 11.3 K/UL (ref 3.5–11.3)

## 2024-12-08 PROCEDURE — 85014 HEMATOCRIT: CPT

## 2024-12-08 PROCEDURE — 85730 THROMBOPLASTIN TIME PARTIAL: CPT

## 2024-12-08 PROCEDURE — 83605 ASSAY OF LACTIC ACID: CPT

## 2024-12-08 PROCEDURE — 93005 ELECTROCARDIOGRAM TRACING: CPT

## 2024-12-08 PROCEDURE — 80051 ELECTROLYTE PANEL: CPT

## 2024-12-08 PROCEDURE — 6360000002 HC RX W HCPCS

## 2024-12-08 PROCEDURE — 82803 BLOOD GASES ANY COMBINATION: CPT

## 2024-12-08 PROCEDURE — 80053 COMPREHEN METABOLIC PANEL: CPT

## 2024-12-08 PROCEDURE — 96374 THER/PROPH/DIAG INJ IV PUSH: CPT | Performed by: EMERGENCY MEDICINE

## 2024-12-08 PROCEDURE — 82330 ASSAY OF CALCIUM: CPT

## 2024-12-08 PROCEDURE — 84520 ASSAY OF UREA NITROGEN: CPT

## 2024-12-08 PROCEDURE — 80179 DRUG ASSAY SALICYLATE: CPT

## 2024-12-08 PROCEDURE — 82565 ASSAY OF CREATININE: CPT

## 2024-12-08 PROCEDURE — 85027 COMPLETE CBC AUTOMATED: CPT

## 2024-12-08 PROCEDURE — 85610 PROTHROMBIN TIME: CPT

## 2024-12-08 PROCEDURE — 82947 ASSAY GLUCOSE BLOOD QUANT: CPT

## 2024-12-08 PROCEDURE — 84443 ASSAY THYROID STIM HORMONE: CPT

## 2024-12-08 PROCEDURE — 80143 DRUG ASSAY ACETAMINOPHEN: CPT

## 2024-12-08 PROCEDURE — 2580000003 HC RX 258

## 2024-12-08 PROCEDURE — 99284 EMERGENCY DEPT VISIT MOD MDM: CPT | Performed by: EMERGENCY MEDICINE

## 2024-12-08 PROCEDURE — G0480 DRUG TEST DEF 1-7 CLASSES: HCPCS

## 2024-12-08 PROCEDURE — 96375 TX/PRO/DX INJ NEW DRUG ADDON: CPT

## 2024-12-08 PROCEDURE — 82140 ASSAY OF AMMONIA: CPT

## 2024-12-08 RX ORDER — NALOXONE HYDROCHLORIDE 0.4 MG/ML
0.4 INJECTION, SOLUTION INTRAMUSCULAR; INTRAVENOUS; SUBCUTANEOUS ONCE
Status: COMPLETED | OUTPATIENT
Start: 2024-12-08 | End: 2024-12-08

## 2024-12-08 RX ORDER — NALOXONE HYDROCHLORIDE 0.4 MG/ML
INJECTION, SOLUTION INTRAMUSCULAR; INTRAVENOUS; SUBCUTANEOUS
Status: COMPLETED
Start: 2024-12-08 | End: 2024-12-08

## 2024-12-08 RX ORDER — SODIUM CHLORIDE 0.9 % (FLUSH) 0.9 %
3 SYRINGE (ML) INJECTION EVERY 8 HOURS
Status: DISCONTINUED | OUTPATIENT
Start: 2024-12-08 | End: 2024-12-09 | Stop reason: HOSPADM

## 2024-12-08 RX ADMIN — SODIUM CHLORIDE 80 MG: 9 INJECTION INTRAMUSCULAR; INTRAVENOUS; SUBCUTANEOUS at 23:11

## 2024-12-08 RX ADMIN — NALXONE HYDROCHLORIDE 0.4 MG: 0.4 INJECTION INTRAMUSCULAR; INTRAVENOUS; SUBCUTANEOUS at 22:45

## 2024-12-08 RX ADMIN — NALOXONE HYDROCHLORIDE 0.4 MG: 0.4 INJECTION, SOLUTION INTRAMUSCULAR; INTRAVENOUS; SUBCUTANEOUS at 22:45

## 2024-12-09 ENCOUNTER — APPOINTMENT (OUTPATIENT)
Dept: CT IMAGING | Age: 44
End: 2024-12-09
Payer: MEDICAID

## 2024-12-09 VITALS
OXYGEN SATURATION: 94 % | SYSTOLIC BLOOD PRESSURE: 130 MMHG | HEART RATE: 99 BPM | TEMPERATURE: 97.2 F | RESPIRATION RATE: 14 BRPM | DIASTOLIC BLOOD PRESSURE: 91 MMHG

## 2024-12-09 LAB
BUN BLD-MCNC: 18 MG/DL (ref 6–20)
CA-I BLD-SCNC: 1.27 MMOL/L (ref 1.15–1.33)
CHLORIDE BLD-SCNC: 107 MMOL/L (ref 98–110)
CO2 BLD CALC-SCNC: 25 MMOL/L (ref 20–31)
EGFR, POC: 77 ML/MIN/1.73M2
GLUCOSE BLD-MCNC: 142 MG/DL (ref 74–100)
HCO3 VENOUS: 25.1 MMOL/L (ref 22–29)
HCT VFR BLD AUTO: 42 % (ref 41–53)
NEGATIVE BASE EXCESS, VEN: 2.9 MMOL/L (ref 0–2)
O2 SAT, VEN: 88.9 % (ref 60–85)
PCO2 VENOUS: 55.9 MM HG (ref 41–51)
PH VENOUS: 7.26 (ref 7.32–7.43)
PO2 VENOUS: 65.8 MM HG (ref 30–50)
POC ANION GAP: 10 MMOL/L (ref 8–16)
POC CREATININE: 1.2 MG/DL (ref 0.51–1.19)
POC HEMOGLOBIN (CALC): 14.3 G/DL (ref 13.5–17.5)
POC LACTIC ACID: 1.6 MMOL/L (ref 0.56–1.39)
POTASSIUM BLD-SCNC: 3.7 MMOL/L (ref 3.5–5.1)
SODIUM BLD-SCNC: 141 MMOL/L (ref 136–145)

## 2024-12-09 PROCEDURE — 70450 CT HEAD/BRAIN W/O DYE: CPT

## 2024-12-09 NOTE — ED NOTES
RN to bedside to obtain rectal temperature  Upon rolling pt on side, pt woke up and sat up in bed  Pt states \"Im confused. What's going on?\"  RN explained to pt he is in the hospital and the reason pt was brought to the ER  Pt denies drug use.   Denies any complaints.  Denies any recent injury.  Pt allowed RN to take rectal temperature and was provided with urinal  Physician at bedside to update pt     (2) good, crying

## 2024-12-09 NOTE — ED NOTES
The following labs labeled with pt sticker and tubed to lab:     [x] Blue     [x] Lavender   [] on ice  [x] Green/yellow  [x] Green/black [] on ice  [x] Yellow  [x] Red  [] Pink      [] COVID-19 swab    [] Rapid  [] PCR  [] Flu Swab  [] Strep Swab  [] Peds Viral Panel     [] Urine Sample  [] Pelvic Cultures  [] Blood Cultures   [] Wound Cultures

## 2024-12-09 NOTE — ED NOTES
SW received phone call from pt's sister. Pt gave permission to talk to sister Leah and ask for a ride.  ETHAN Vargas also spoke to pt and pt will come to ED and pick pt up.

## 2024-12-09 NOTE — ED NOTES
Dr. Soto and Dr. Jewell at bedside  Verbal order per Dr. Soto for 0.4mg Narcan given in L forearm IV per ETHAN Chan

## 2024-12-09 NOTE — ED NOTES
Pt attempting to provide urine sample at this time  Pt appears confused. Alert and oriented to self and place, but disoriented to time and situation

## 2024-12-09 NOTE — ED PROVIDER NOTES
[facility]   Emergency Department  Emergency Medicine Attending Sign-out   Note started: 12:00 AM EST    Care of Dt Roman Dillard was assumed from previous attending Dr. Jewell at midnight and is being seen for Drug Overdose  .  The patient's initial evaluation and plan have been discussed with the prior provider who initially evaluated the patient.     Attestation  I was available and discussed any additional care issues that arose and coordinated the management plans with the resident(s) caring for the patient during my duty period. Any areas of disagreement with resident's documentation of care or procedures are noted on the chart. I was personally present for the key portions of any/all procedures, during my duty period. I have documented in the chart those procedures where I was not present during the key portions.     BRIEF PATIENT SUMMARY/MDM COURSE PER INITIAL PROVIDER:   RECENT VITALS:      ,  Pulse: 53, Respirations: 10, BP: (!) 132/94, SpO2: 100 %    This patient is a 89 y.o. Adult with altered mental status, minimally responsive.  Unknown about overdose.  Diaphoretic.  Getting CT imaging and labs    DIAGNOSTICS/MEDICATIONS:     MEDICATIONS GIVEN:  ED Medication Orders (From admission, onward)      Start Ordered     Status Ordering Provider    12/08/24 2315 12/08/24 2302  pantoprazole (PROTONIX) 80 mg in sodium chloride (PF) 0.9 % 20 mL injection  ONCE         Last MAR action: Given - by JOSEPH TAM on 12/08/24 at 2311 BELLO LORENZO    12/08/24 2245 12/08/24 2243  sodium chloride flush 0.9 % injection 3 mL  EVERY 8 HOURS        Placed in \"And\" Linked Group    Acknowledged BELLO LORENZO    12/08/24 2245 12/08/24 2244  naloxone (NARCAN) injection 0.4 mg  ONCE         Last MAR action: Given - by NAFISA PALMER on 12/08/24 at 2245 BELLO LORENZO            LABS    Labs Reviewed   CBC - Abnormal; Notable for the following components:       Result Value    RBC 4.00 (*)     
Reviewed  Independent Historian: EMS  Labs: ordered.  Radiology: ordered.  ECG/medicine tests: ordered.    Risk  Prescription drug management.  Decision regarding hospitalization.  Diagnosis or treatment significantly limited by social determinants of health.        EKG Interpretation  EKG Interpretation    Interpreted by emergency department physician    Rhythm: sinus bradycardia  Rate: 50-60  Axis: normal  Ectopy: none  Conduction:   QTc 477  ST Segments: normal  T Waves: normal  Q Waves: none    EKG  Impression: sinus bradycardia    Thomas Jewell MD    Interpreted by me      Thomas Jewell MD, FACEP, FAAEM  Attending Emergency Physician        Thomas Jewell MD  12/09/24 0014    
evidence of acute taxation.  Will plan to ambulate patient, p.o. challenge and discharge home with PCP follow-up. [DH]      ED Course User Index  [DH] Bebeto Soto MD       FINAL IMPRESSION      1. Transient alteration of awareness          DISPOSITION / PLAN     DISPOSITION Decision To Discharge 12/09/2024 01:28:42 AM   DISPOSITION CONDITION Stable           PATIENT REFERRED TO:  Primary care provider  Call your insurance company to find a primary care provider in your area that is accepting new patients  Call       [facility] Emergency Department  [address] [place] [address]  [phone]  Go to   If symptoms worsen      DISCHARGE MEDICATIONS:  There are no discharge medications for this patient.      Bebeto Soto MD  Emergency Medicine Resident    (Please note that portions of this note were completed with a voice recognition program.  Efforts were made to edit the dictations but occasionally words are mis-transcribed.)

## 2024-12-09 NOTE — ED NOTES
GCS remains 3  Pt not opening eyes. Does not respond to pain. No movements noted  Physician at bedside

## 2024-12-09 NOTE — ED NOTES
Pt removed self from monitor  RN to room  Pt states he is ready to leave  RN provided pt with crackers and water  PO challenge passed

## 2024-12-09 NOTE — DISCHARGE INSTRUCTIONS
You are seen in the emergency department after being brought in by EMS unresponsive and vomiting.  You underwent a full evaluation which was all normal and you became alert and oriented during your stay here.  There is unclear why you had a transient alteration in your awareness, however there is no reason to keep you in the hospital and you wanted to go home.  You should follow-up with your primary care provider for further evaluation and monitoring.  If you do experience any additional concerning symptoms, please return to the emergency department for further evaluation.

## 2024-12-09 NOTE — ED NOTES
Pt presents to the ED via M23 with c/o possible OD  Per EMS, roommate called EMS due to pt found down  Pt has hx of drug use, but per roommate recently has been clean from drug use  Pt not responsive for EMS, 1 episode of vomiting en route in which 4mg Zofran given  Pt arrives w/ GCS 3. VSS.  Pt diaphoretic and clammy on arrival  Connected to full monitor  End tidal placed on pt

## 2024-12-12 LAB
EKG ATRIAL RATE: 58 BPM
EKG P AXIS: 72 DEGREES
EKG P-R INTERVAL: 102 MS
EKG Q-T INTERVAL: 486 MS
EKG QRS DURATION: 112 MS
EKG QTC CALCULATION (BAZETT): 477 MS
EKG R AXIS: 63 DEGREES
EKG T AXIS: 65 DEGREES
EKG VENTRICULAR RATE: 58 BPM

## 2024-12-12 PROCEDURE — 93010 ELECTROCARDIOGRAM REPORT: CPT | Performed by: INTERNAL MEDICINE

## (undated) DEVICE — ENDO KIT W/SYRINGE: Brand: MEDLINE INDUSTRIES, INC.

## (undated) DEVICE — TUBING, SUCTION, 3/16" X 10', STRAIGHT: Brand: MEDLINE

## (undated) DEVICE — ST CHARLES BRONCHOSCOPY PACK: Brand: MEDLINE INDUSTRIES, INC.

## (undated) DEVICE — DEFENDO AIR WATER SUCTION AND BIOPSY VALVE KIT FOR  OLYMPUS: Brand: DEFENDO AIR/WATER/SUCTION AND BIOPSY VALVE

## (undated) DEVICE — JELLY,LUBE,STERILE,FLIP TOP,TUBE,2-OZ: Brand: MEDLINE

## (undated) DEVICE — YANKAUER,BULB TIP,W/O VENT,RIGID,STERILE: Brand: MEDLINE

## (undated) DEVICE — GLOVE ORANGE PI 7   MSG9070

## (undated) DEVICE — SOLUTION IV IRRIG POUR BRL 0.9% SODIUM CHL 2F7124

## (undated) DEVICE — BITEBLOCK 54FR W/ DENT RIM BLOX

## (undated) DEVICE — FORCEPS BX L240CM WRK CHN 2.8MM STD CAP W/ NDL MIC MESH

## (undated) DEVICE — SYRINGE MED 50ML LUERSLIP TIP

## (undated) DEVICE — TRAP SPEC 40CC MUCUS OPN SUCT